# Patient Record
Sex: FEMALE | Race: BLACK OR AFRICAN AMERICAN | NOT HISPANIC OR LATINO | Employment: UNEMPLOYED | URBAN - METROPOLITAN AREA
[De-identification: names, ages, dates, MRNs, and addresses within clinical notes are randomized per-mention and may not be internally consistent; named-entity substitution may affect disease eponyms.]

---

## 2020-01-15 ENCOUNTER — HISTORICAL (OUTPATIENT)
Dept: LAB | Facility: HOSPITAL | Age: 45
End: 2020-01-15

## 2020-01-15 LAB
ABS NEUT (OLG): 11.9 X10(3)/MCL (ref 1.5–6.9)
ALBUMIN SERPL-MCNC: 3.2 GM/DL (ref 3.4–5)
ALBUMIN/GLOB SERPL: 0.5 RATIO
ALP SERPL-CCNC: 78 UNIT/L (ref 30–113)
ALT SERPL-CCNC: 31 UNIT/L (ref 10–45)
ANISOCYTOSIS BLD QL SMEAR: ABNORMAL
APPEARANCE, UA: NORMAL
AST SERPL-CCNC: 18 UNIT/L (ref 15–37)
BASOPHILS # BLD AUTO: 0.1 X10(3)/MCL (ref 0–0.1)
BASOPHILS NFR BLD AUTO: 0 % (ref 0–1)
BILIRUB SERPL-MCNC: 0.2 MG/DL (ref 0.1–0.9)
BILIRUB UR QL STRIP: NEGATIVE
BILIRUBIN DIRECT+TOT PNL SERPL-MCNC: 0.1 MG/DL
BILIRUBIN DIRECT+TOT PNL SERPL-MCNC: 0.1 MG/DL (ref 0–0.3)
BUN SERPL-MCNC: 15 MG/DL (ref 10–20)
CALCIUM SERPL-MCNC: 9.9 MG/DL (ref 8–10.5)
CHLORIDE SERPL-SCNC: 102 MMOL/L (ref 100–108)
CHOLEST SERPL-MCNC: 173 MG/DL (ref 140–200)
CHOLEST/HDLC SERPL: 5 MG/DL (ref 0–8)
CO2 SERPL-SCNC: 30 MMOL/L (ref 21–35)
COLOR UR: NORMAL
CREAT SERPL-MCNC: 0.79 MG/DL (ref 0.7–1.3)
EOSINOPHIL # BLD AUTO: 0 X10(3)/MCL (ref 0–0.6)
EOSINOPHIL NFR BLD AUTO: 0 % (ref 0–5)
ERYTHROCYTE [DISTWIDTH] IN BLOOD BY AUTOMATED COUNT: 16.8 % (ref 11.5–17)
GLOBULIN SER-MCNC: 6.8 GM/DL
GLUCOSE (UA): NEGATIVE
GLUCOSE SERPL-MCNC: 96 MG/DL (ref 75–116)
HCT VFR BLD AUTO: 40.3 % (ref 36–48)
HDLC SERPL-MCNC: 36 MG/DL (ref 35–59)
HGB BLD-MCNC: 12 GM/DL (ref 12–16)
HGB UR QL STRIP: NEGATIVE
HYPOCHROMIA BLD QL SMEAR: ABNORMAL
IMM GRANULOCYTES # BLD AUTO: 0.06 10*3/UL (ref 0–0.02)
IMM GRANULOCYTES NFR BLD AUTO: 0.4 % (ref 0–0.43)
KETONES UR QL STRIP: NEGATIVE
LDLC SERPL CALC-MCNC: 122 MG/DL (ref 100–129)
LEUKOCYTE ESTERASE UR QL STRIP: NEGATIVE
LYMPHOCYTES # BLD AUTO: 3.6 X10(3)/MCL (ref 0.5–4.1)
LYMPHOCYTES NFR BLD AUTO: 22 % (ref 15–40)
MCH RBC QN AUTO: 24 PG (ref 27–34)
MCHC RBC AUTO-ENTMCNC: 30 GM/DL (ref 31–36)
MCV RBC AUTO: 79 FL (ref 80–99)
MONOCYTES # BLD AUTO: 0.3 X10(3)/MCL (ref 0–1.1)
MONOCYTES NFR BLD AUTO: 2 % (ref 4–12)
NEUTROPHILS # BLD AUTO: 11.9 X10(3)/MCL (ref 1.5–6.9)
NEUTROPHILS NFR BLD AUTO: 75 % (ref 43–75)
NITRITE UR QL STRIP: NEGATIVE
PH UR STRIP: 5.5 [PH]
PLATELET # BLD AUTO: 626 X10(3)/MCL (ref 140–400)
PLATELET # BLD EST: ABNORMAL 10*3/UL
PMV BLD AUTO: 9 FL (ref 6.8–10)
POTASSIUM SERPL-SCNC: 4.1 MMOL/L (ref 3.6–5.2)
PROT SERPL-MCNC: 10 GM/DL (ref 6.4–8.2)
PROT UR QL STRIP: NEGATIVE
RBC # BLD AUTO: 5.09 X10(6)/MCL (ref 4.2–5.4)
RBC MORPH BLD: ABNORMAL
SODIUM SERPL-SCNC: 139 MMOL/L (ref 135–145)
SP GR UR STRIP: >=1.03
T4 FREE SERPL-MCNC: 1.1 NG/DL (ref 0.76–1.46)
TRIGL SERPL-MCNC: 100 MG/DL (ref 35–150)
TSH SERPL-ACNC: 1.85 MIU/ML (ref 0.36–3.74)
UROBILINOGEN UR STRIP-ACNC: 0.2 EU/DL
VLDLC SERPL CALC-MCNC: 20 MG/DL
WBC # SPEC AUTO: 15.9 X10(3)/MCL (ref 4.5–11.5)

## 2020-03-03 ENCOUNTER — HISTORICAL (OUTPATIENT)
Dept: ADMINISTRATIVE | Facility: HOSPITAL | Age: 45
End: 2020-03-03

## 2020-03-03 LAB
SERINE PROT 3-ARUP: 5 AU/ML
URATE SERPL-MCNC: 5.3 MG/DL (ref 2.6–6)

## 2020-08-04 ENCOUNTER — HISTORICAL (OUTPATIENT)
Dept: RESPIRATORY THERAPY | Facility: HOSPITAL | Age: 45
End: 2020-08-04

## 2021-02-04 ENCOUNTER — HISTORICAL (OUTPATIENT)
Dept: ADMINISTRATIVE | Facility: HOSPITAL | Age: 46
End: 2021-02-04

## 2021-02-04 LAB
ABS NEUT (OLG): 12.97 X10(3)/MCL (ref 2.1–9.2)
ALBUMIN SERPL-MCNC: 4.3 GM/DL (ref 3.5–5)
ALBUMIN/GLOB SERPL: 0.7 RATIO (ref 1.1–2)
ALP SERPL-CCNC: 111 UNIT/L (ref 40–150)
ALT SERPL-CCNC: 20 UNIT/L (ref 0–55)
APPEARANCE, UA: CLEAR
AST SERPL-CCNC: 22 UNIT/L (ref 5–34)
BACTERIA #/AREA URNS AUTO: ABNORMAL /HPF
BASOPHILS # BLD AUTO: 0.1 X10(3)/MCL (ref 0–0.2)
BASOPHILS NFR BLD AUTO: 0 %
BILIRUB SERPL-MCNC: 0.2 MG/DL
BILIRUB UR QL STRIP: NEGATIVE
BILIRUBIN DIRECT+TOT PNL SERPL-MCNC: 0 MG/DL (ref 0–0.8)
BILIRUBIN DIRECT+TOT PNL SERPL-MCNC: 0.2 MG/DL (ref 0–0.5)
BUN SERPL-MCNC: 16 MG/DL (ref 7–18.7)
C3 SERPL-MCNC: 190 MG/DL (ref 80–173)
C4 SERPL-MCNC: 21 MG/DL (ref 13–46)
CALCIUM SERPL-MCNC: 10.4 MG/DL (ref 8.4–10.2)
CHLORIDE SERPL-SCNC: 97 MMOL/L (ref 98–107)
CO2 SERPL-SCNC: 29 MMOL/L (ref 22–29)
COLOR UR: YELLOW
CREAT SERPL-MCNC: 0.85 MG/DL (ref 0.55–1.02)
CREAT UR-MCNC: 230.3 MG/DL (ref 45–106)
EOSINOPHIL # BLD AUTO: 0.1 X10(3)/MCL (ref 0–0.9)
EOSINOPHIL NFR BLD AUTO: 0 %
ERYTHROCYTE [DISTWIDTH] IN BLOOD BY AUTOMATED COUNT: 16.6 % (ref 11.5–14.5)
ERYTHROCYTE [SEDIMENTATION RATE] IN BLOOD: 59 MM/HR (ref 0–20)
GLOBULIN SER-MCNC: 6 GM/DL (ref 2.4–3.5)
GLUCOSE (UA): NEGATIVE
GLUCOSE SERPL-MCNC: 90 MG/DL (ref 74–100)
HCT VFR BLD AUTO: 45.2 % (ref 35–46)
HGB BLD-MCNC: 13.6 GM/DL (ref 12–16)
HGB UR QL STRIP: NEGATIVE
HYALINE CASTS #/AREA URNS LPF: ABNORMAL /LPF
IMM GRANULOCYTES # BLD AUTO: 0.08 10*3/UL
IMM GRANULOCYTES NFR BLD AUTO: 0 %
KETONES UR QL STRIP: NEGATIVE
LEUKOCYTE ESTERASE UR QL STRIP: NEGATIVE
LYMPHOCYTES # BLD AUTO: 4.8 X10(3)/MCL (ref 0.6–4.6)
LYMPHOCYTES NFR BLD AUTO: 26 %
MCH RBC QN AUTO: 25 PG (ref 26–34)
MCHC RBC AUTO-ENTMCNC: 30.1 GM/DL (ref 31–37)
MCV RBC AUTO: 83.2 FL (ref 80–100)
MONOCYTES # BLD AUTO: 0.6 X10(3)/MCL (ref 0.1–1.3)
MONOCYTES NFR BLD AUTO: 3 %
NEUTROPHILS # BLD AUTO: 12.97 X10(3)/MCL (ref 2.1–9.2)
NEUTROPHILS NFR BLD AUTO: 70 %
NITRITE UR QL STRIP: NEGATIVE
PH UR STRIP: 5.5 [PH] (ref 4.5–8)
PLATELET # BLD AUTO: 486 X10(3)/MCL (ref 130–400)
PMV BLD AUTO: 9.9 FL (ref 7.4–10.4)
POTASSIUM SERPL-SCNC: 3.8 MMOL/L (ref 3.5–5.1)
PROT SERPL-MCNC: 10.3 GM/DL (ref 6.4–8.3)
PROT UR QL STRIP: 30 MG/DL
PROT UR STRIP-MCNC: 36.3 MG/DL
PROT/CREAT UR-RTO: 157.6 MG/GM
RBC # BLD AUTO: 5.43 X10(6)/MCL (ref 4–5.2)
RBC #/AREA URNS AUTO: ABNORMAL /HPF
SODIUM SERPL-SCNC: 139 MMOL/L (ref 136–145)
SP GR UR STRIP: 1.02 (ref 1–1.03)
SQUAMOUS #/AREA URNS LPF: ABNORMAL /LPF
UROBILINOGEN UR STRIP-ACNC: NORMAL
WBC # SPEC AUTO: 18.6 X10(3)/MCL (ref 4.5–11)
WBC #/AREA URNS AUTO: ABNORMAL /HPF

## 2021-02-07 LAB — FINAL CULTURE: NO GROWTH

## 2021-04-29 ENCOUNTER — TELEPHONE (OUTPATIENT)
Dept: TRANSPLANT | Facility: CLINIC | Age: 46
End: 2021-04-29

## 2021-05-05 ENCOUNTER — TELEPHONE (OUTPATIENT)
Dept: TRANSPLANT | Facility: CLINIC | Age: 46
End: 2021-05-05

## 2021-05-10 ENCOUNTER — TELEPHONE (OUTPATIENT)
Dept: TRANSPLANT | Facility: CLINIC | Age: 46
End: 2021-05-10

## 2021-05-27 ENCOUNTER — TELEPHONE (OUTPATIENT)
Dept: TRANSPLANT | Facility: CLINIC | Age: 46
End: 2021-05-27

## 2021-06-21 ENCOUNTER — HISTORICAL (OUTPATIENT)
Dept: RESPIRATORY THERAPY | Facility: HOSPITAL | Age: 46
End: 2021-06-21

## 2021-07-15 ENCOUNTER — HISTORICAL (OUTPATIENT)
Dept: ADMINISTRATIVE | Facility: HOSPITAL | Age: 46
End: 2021-07-15

## 2021-07-15 LAB
ABS NEUT (OLG): 13.17 X10(3)/MCL (ref 2.1–9.2)
ALBUMIN SERPL-MCNC: 4.5 GM/DL (ref 3.5–5)
ALBUMIN/GLOB SERPL: 1 RATIO (ref 1.1–2)
ALP SERPL-CCNC: 92 UNIT/L (ref 40–150)
ALT SERPL-CCNC: 20 UNIT/L (ref 0–55)
AST SERPL-CCNC: 24 UNIT/L (ref 5–34)
BASOPHILS # BLD AUTO: 0.1 X10(3)/MCL (ref 0–0.2)
BASOPHILS NFR BLD AUTO: 0 %
BILIRUB SERPL-MCNC: 0.5 MG/DL
BILIRUBIN DIRECT+TOT PNL SERPL-MCNC: 0.2 MG/DL (ref 0–0.5)
BILIRUBIN DIRECT+TOT PNL SERPL-MCNC: 0.3 MG/DL (ref 0–0.8)
BUN SERPL-MCNC: 10.8 MG/DL (ref 7–18.7)
CALCIUM SERPL-MCNC: 9.8 MG/DL (ref 8.4–10.2)
CHLORIDE SERPL-SCNC: 99 MMOL/L (ref 98–107)
CK SERPL-CCNC: 124 U/L (ref 29–168)
CO2 SERPL-SCNC: 30 MMOL/L (ref 22–29)
CREAT SERPL-MCNC: 0.79 MG/DL (ref 0.55–1.02)
CRP SERPL-MCNC: <0.1 MG/DL
DEPRECATED CALCIDIOL+CALCIFEROL SERPL-MC: 6.3 NG/ML (ref 30–80)
EOSINOPHIL # BLD AUTO: 0.7 X10(3)/MCL (ref 0–0.9)
EOSINOPHIL NFR BLD AUTO: 4 %
ERYTHROCYTE [DISTWIDTH] IN BLOOD BY AUTOMATED COUNT: 19.5 % (ref 11.5–14.5)
ERYTHROCYTE [SEDIMENTATION RATE] IN BLOOD: 8 MM/HR (ref 0–20)
GLOBULIN SER-MCNC: 4.3 GM/DL (ref 2.4–3.5)
GLUCOSE SERPL-MCNC: 77 MG/DL (ref 74–100)
HCT VFR BLD AUTO: 47.2 % (ref 35–46)
HGB BLD-MCNC: 14.3 GM/DL (ref 12–16)
IMM GRANULOCYTES # BLD AUTO: 0.09 10*3/UL
IMM GRANULOCYTES NFR BLD AUTO: 0 %
LYMPHOCYTES # BLD AUTO: 3.1 X10(3)/MCL (ref 0.6–4.6)
LYMPHOCYTES NFR BLD AUTO: 18 %
MCH RBC QN AUTO: 26.8 PG (ref 26–34)
MCHC RBC AUTO-ENTMCNC: 30.3 GM/DL (ref 31–37)
MCV RBC AUTO: 88.6 FL (ref 80–100)
MONOCYTES # BLD AUTO: 0.6 X10(3)/MCL (ref 0.1–1.3)
MONOCYTES NFR BLD AUTO: 3 %
NEG CONT SPOT COUNT: NORMAL
NEUTROPHILS # BLD AUTO: 13.17 X10(3)/MCL (ref 2.1–9.2)
NEUTROPHILS NFR BLD AUTO: 74 %
NRBC BLD AUTO-RTO: 0 % (ref 0–0.2)
PANEL A SPOT COUNT: 0
PANEL B SPOT COUNT: 0
PLATELET # BLD AUTO: 278 X10(3)/MCL (ref 130–400)
PMV BLD AUTO: 10 FL (ref 7.4–10.4)
POS CONT SPOT COUNT: NORMAL
POTASSIUM SERPL-SCNC: 3.4 MMOL/L (ref 3.5–5.1)
PROT SERPL-MCNC: 8.8 GM/DL (ref 6.4–8.3)
RBC # BLD AUTO: 5.33 X10(6)/MCL (ref 4–5.2)
SODIUM SERPL-SCNC: 142 MMOL/L (ref 136–145)
T-SPOT.TB: NORMAL
TSH SERPL-ACNC: 1.59 UIU/ML (ref 0.35–4.94)
WBC # SPEC AUTO: 17.7 X10(3)/MCL (ref 4.5–11)

## 2021-12-16 ENCOUNTER — HISTORICAL (OUTPATIENT)
Dept: RADIOLOGY | Facility: HOSPITAL | Age: 46
End: 2021-12-16

## 2022-04-10 ENCOUNTER — HISTORICAL (OUTPATIENT)
Dept: ADMINISTRATIVE | Facility: HOSPITAL | Age: 47
End: 2022-04-10
Payer: MEDICAID

## 2022-04-28 VITALS
DIASTOLIC BLOOD PRESSURE: 84 MMHG | BODY MASS INDEX: 43.88 KG/M2 | WEIGHT: 189.63 LBS | SYSTOLIC BLOOD PRESSURE: 130 MMHG | OXYGEN SATURATION: 98 % | HEIGHT: 55 IN

## 2022-05-04 NOTE — HISTORICAL [facility] CERNER
This is a historical note converted from Martha. Formatting and pictures may have been removed.  Please reference Martha for original formatting and attached multimedia. Chief Complaint  New patient--pain in R knee  History of Present Illness  ???? Ms. Brady is a 46 y/o AAF who presents to Rheumatology clinic with?+RF and INES (1:640 cytoplasmic) in the setting of non-specific chronic interstitial changes of the lungs. States the work up started after CTs done in [place] w/ pneumonia infection showed more NSIP pattern. Repeat imagining in 8/2020?read bronchiectasis but looking at the imaging there appears to be some fibrosis int he bases. She is on continuous O2 2L per NC. Cardiologist is working?on getting?CPAP for nights. Will request his records and get last ECHO. Gives hx of joint pain in the wrists and knees. Finds warmth helps. Reports 1-2 hr of stiffness in the am. Is currently on Prednisone 10mg/d.  ?  ROS: Denies constitutional s/s, eye inflammations, oral/ nasal ulcers, LAD, sicca, serositis, seizures, blood clots, rash,?sun sensitivity, hair loss, Raynauds, blood dyscrasias, liver or kidney issues.  +cough, shortness of breath, x2 miscarriages at 7 months (lost a 4 month old to SIDS)  ?  PMH: HTN, CHF  Social Hx: Denies ETOH, TBO or drugs.  FH AI Dz: Dad w/ RA  Review of Systems  General: Denies chills and fever  Ophthalmologic: Denies discharge. Denies flashes of light in visual field  ENT: Denies decreased sense of smell. Denies nosebleeds.?  Endocrine: Denies excessive sweating. Denies weight loss  Respiratory: Denies hemoptysis. Denies tuberculosis.  Cardiovascular: Denies cyanosis. Denies rheumatic fever.  Gastrointestinal: Denies difficulty swallowing. Denies hematemesis.  Hematology: Denies bleeding problems. Denies recent transfusion.  Peripheral vascular: Denies absent pulses in feet. Denies ulceration of the feet  Skin: Denies blistering of skin. Denies skin cancer.  Neurologic denies seizures.  Denies stroke.  Psychiatric: Denies eating disorder. Denies loss of appetite.?  Physical Exam  Vitals & Measurements  T:?37? ?C (Oral)? HR:?99(Peripheral)? RR:?22? BP:?117/80? SpO2:?98%?  HT:?140.00?cm? WT:?82.190?kg? BMI:?41.93?  General examination: Alert, pleasant, in no acute distress.  Head: Normocephalic, atraumatic.  Eyes: Conjunctiva clear, upper eyelids normal, lower eyelids normal.  Oral cavity: Mucosa moist, no lesions, fair dentition.  Throat: No exudate, no erythema.  Neck/thyroid: Neck supple, no lymphadenopathy, no thyroid nodules  Skin: No suspicious lesions, warm and dry.  Heart: S1, S2 normal, regular rate and rhythm. No JVD.  Lungs: Course on inspiration upper two thirds. Clear to auscultation, no accessory muscle use otherwise.  Musculoskeletal: Fullness right wrist. Crepitus in b/l knees, no warmth. FROM. No synovitis otherwise. No deformities.  Extremities: No?cyanosis. +clubbing  Neurologic: Alert and oriented, cranial nerves II through XII grossly intact. Muscle strength grossly intact. Sensation grossly intact.  Psych: Cognitive function intact, mood/affect full range.?  ?  (08/04/2020 11:55 CDT CT Thorax High Resolution W/O Contrast)  ?  Reason For Exam  Unspecified asthma, uncomplicated;Other (please specify)  ?  Radiology Report  ?  History.  Unspecified asthma, uncomplicated.  ?  Reference.  No prior chest CT.  ?  Technique.  Helical acquisition through the chest performed without IV contrast  according to high-resolution protocol. Three plane reconstructions  made available for review. DLP 2714 mGycm. Automatic exposure control,  adjustment of mA/kV or iterative reconstruction technique was used to  reduce radiation.  ?  Findings.  Few prominent mediastinal lymph nodes are nonspecific and may be  reactive. Difficult to evaluate the yolande without contrast. There are  small bilateral axillary lymph nodes. Unremarkable thyroid.?  ?  Heart is not significantly enlarged. No pericardial  effusion.?  ?  There is no pleural effusion. There are irregular groundglass and  reticular opacities favoring the mid to lower lungs. There is no  convincing honeycombing. There are some areas of mild bronchiectasis  primarily right middle lobe. No discrete solid pulmonary nodule. No  appreciable air trapping on expiration.  ?  There is no significant abnormality of the imaged upper abdomen. No  acute osseous findings.  ?  Impression.  Nonspecific chronic interstitial changes favoring the mid to lower  lungs. No prior imaging is available for comparison.  Assessment/Plan  1. ILD w/ +RF and +INES. Pt is poor historian but apparently symptoms began with shortness of breath followed by a few episodes of pneumonia w/ subsequent CT chest and autoimmune work up. RF 22, 13. INES 1:640 cytoplasmic. CCP and ANCA negative. She is on continuous O2 at 2L per NC?and states she has been on 10mg prednisone daily for over a year. PFT ordered in August but unable to complete by pt. Will continue work up w/ scleroderma panel, myositis panel, INOCENTE, C3, C4, dsDNA, UPC, UA, ESR, CRP and reorder PFTs. Also, request latest echo from cardiologist, Dr. Nj in [place].  ?  2. Joint Pain w/ positive RF. RF IgG 22, IgM 13. CCP normal.?She does give inflammatory pattern of joint pain w/ 1-2 hr of morning stiffness in the b/l wrists. She is on 10mg prednisone daily, therefore joint exam is somewhat skewed but does have fullness in the right wrist and wears b/l wrist braces.  ?  3. INES. Check INOCENTE, C3, C4, dsDNA, UPC, UA, ESR.  ?  4. Hx of two miscarriages. Both at 7 months gestation. Check APLA.  ?  5. HTN. CCC.  ?  6. Reported hx of CHF. On furosemide 40mg/d. Request cardiology records.  ?  7. Vaccines. Prevnar today. Reports flu is UTD.  ?  8. High Risk Med Use. Check TPMT. Plan for hep serologies and Tspot.  ?  RTC 1 month w/ Dr. Calderon.   Problem List/Past Medical History  Ongoing  INES positive  History of miscarriage  ILD (interstitial lung  disease)  Obesity  Rheumatoid factor positive  Historical  No qualifying data  Procedure/Surgical History  denies   Medications  Albuterol (Eqv-Proventil HFA), INH, q6hr  albuterol 0.083% inhalation solution, 2.5 mg= 3 mL, NEB, q6hr  albuterol-ipratropium 2.5 mg-0.5 mg/3 mL inhalation solution, 3 mL, NEB, q6hr  amLODIPine 10 mg oral tablet, 10 mg= 1 tab(s), Oral, Daily  furosemide 40 mg oral tablet, 40 mg= 1 tab(s), Oral, Daily  gabapentin 100 mg oral capsule, 100 mg= 1 cap(s), Oral, BID  loratadine 10 mg oral tablet, 10 mg= 1 tab(s), Oral, Daily  meloxicam 15 mg oral tablet, 15 mg= 1 tab(s), Oral, Daily  metoprolol tartrate 100 mg oral tab, 100 mg= 1 tab(s), Oral, BID  omeprazole 20 mg oral DR capsule, 20 mg= 1 cap(s), Oral, Daily  prednisONE 10 mg oral tablet, See Instructions, 1 refills  Symbicort 160 mcg-4.5 mcg/inh inhalation aerosol, 2 puff(s), INH, BID  Allergies  No Known Medication Allergies  Social History  Abuse/Neglect  No, 09/16/2019  Alcohol  Never, 09/16/2019  Employment/School  Employed, 09/16/2019  Exercise  Exercise duration: 0., 09/16/2019  Home/Environment  Lives with Spouse., 09/16/2019  Nutrition/Health  Regular, 09/16/2019  Sexual  Gender Identity Identifies as female., 02/04/2021  Sexually active: Yes., 09/16/2019  Spiritual/Cultural  Samaritan, 09/16/2019  Substance Use  Never, 09/16/2019  Tobacco  Never (less than 100 in lifetime), N/A, 02/04/2021  Family History  Diabetes mellitus type 2: Mother and Father.  Heart disease: Mother, Sister and Son.  Hypertension.: Mother and Father.  Kidney disease: Sister.  Immunizations  Vaccine Date Status   pneumococcal 13-valent conjugate vaccine 02/04/2021 Given   Health Maintenance  Health Maintenance  ???Pending?(in the next year)  ??? ??OverDue  ??? ? ? ?Influenza Vaccine due??10/01/20??and every 1??day(s)  ??? ? ? ?Hypertension Management-BMP due??01/14/21??and every 1??year(s)  ??? ??Due?  ??? ? ? ?Alcohol Misuse Screening due??01/02/21??and every  1??year(s)  ??? ? ? ?COPD Maintenance-Spirometry due??02/04/21??Unknown Frequency  ??? ? ? ?Cervical Cancer Screening due??02/04/21??Unknown Frequency  ??? ? ? ?Tetanus Vaccine due??02/04/21??and every 10??year(s)  ??? ??Due In Future?  ??? ? ? ?Obesity Screening not due until??01/01/22??and every 1??year(s)  ???Satisfied?(in the past 1 year)  ??? ??Satisfied?  ??? ? ? ?ADL Screening on??02/04/21.??Satisfied by Milligan RN, Desi R  ??? ? ? ?Blood Pressure Screening on??02/04/21.??Satisfied by Milligan RN, Desi R  ??? ? ? ?Body Mass Index Check on??02/04/21.??Satisfied by Milligan RN, Desi R  ??? ? ? ?Depression Screening on??02/04/21.??Satisfied by Milligan RN, Desi R  ??? ? ? ?Hypertension Management-Blood Pressure on??02/04/21.??Satisfied by Milligan RN, Desi R  ??? ? ? ?Obesity Screening on??02/04/21.??Satisfied by Milligan RN, Desi R  ?     [1]?CT Thorax High Resolution W/O Contrast; Louann BAINS, Ed Russell 08/04/2020 11:55 CDT

## 2022-05-11 ENCOUNTER — TELEPHONE (OUTPATIENT)
Dept: RHEUMATOLOGY | Facility: CLINIC | Age: 47
End: 2022-05-11
Payer: MEDICAID

## 2022-05-11 NOTE — TELEPHONE ENCOUNTER
Received notice Ofev denial was overturned  Called [id number] and spoke to Ayesha  She said Ofev has been dispensed [facility] [place]    Called pt to f/u  Explained med now approved by insurance and she can reorder as she has been   Pt verbalized understanding  Reminded her to call if needs arise    Pt scheduled to see new Rheum Dr. Starr on 5/16/2022

## 2022-06-30 DIAGNOSIS — R74.8 ACID PHOSPHATASE ELEVATED: Primary | ICD-10-CM

## 2022-07-19 ENCOUNTER — HOSPITAL ENCOUNTER (INPATIENT)
Facility: HOSPITAL | Age: 47
LOS: 8 days | Discharge: HOME | DRG: 683 | End: 2022-07-28
Attending: FAMILY MEDICINE | Admitting: STUDENT IN AN ORGANIZED HEALTH CARE EDUCATION/TRAINING PROGRAM
Payer: MEDICARE

## 2022-07-19 DIAGNOSIS — E83.39 HYPOPHOSPHATASIA: ICD-10-CM

## 2022-07-19 DIAGNOSIS — E83.42 HYPOMAGNESEMIA: ICD-10-CM

## 2022-07-19 DIAGNOSIS — E83.39 HYPERPHOSPHATEMIA: ICD-10-CM

## 2022-07-19 DIAGNOSIS — N30.00 ACUTE CYSTITIS WITHOUT HEMATURIA: ICD-10-CM

## 2022-07-19 DIAGNOSIS — N17.9 ACUTE RENAL FAILURE: ICD-10-CM

## 2022-07-19 DIAGNOSIS — N17.9 AKI (ACUTE KIDNEY INJURY): Primary | ICD-10-CM

## 2022-07-19 DIAGNOSIS — Z99.2 ESRD (END STAGE RENAL DISEASE) ON DIALYSIS: ICD-10-CM

## 2022-07-19 DIAGNOSIS — J84.9 ILD (INTERSTITIAL LUNG DISEASE): ICD-10-CM

## 2022-07-19 DIAGNOSIS — E16.2 HYPOGLYCEMIA: ICD-10-CM

## 2022-07-19 DIAGNOSIS — E83.39 HYPOPHOSPHATEMIA: ICD-10-CM

## 2022-07-19 DIAGNOSIS — E83.51 HYPOCALCEMIA: ICD-10-CM

## 2022-07-19 DIAGNOSIS — E87.6 HYPOKALEMIA: ICD-10-CM

## 2022-07-19 DIAGNOSIS — E87.20 METABOLIC ACIDOSIS: ICD-10-CM

## 2022-07-19 DIAGNOSIS — N18.6 ESRD (END STAGE RENAL DISEASE) ON DIALYSIS: ICD-10-CM

## 2022-07-19 DIAGNOSIS — R06.02 SOB (SHORTNESS OF BREATH): ICD-10-CM

## 2022-07-19 DIAGNOSIS — R07.9 CHEST PAIN: ICD-10-CM

## 2022-07-19 DIAGNOSIS — N17.9 ACUTE RENAL FAILURE, UNSPECIFIED ACUTE RENAL FAILURE TYPE: ICD-10-CM

## 2022-07-19 DIAGNOSIS — D89.89 ANTISYNTHETASE SYNDROME: ICD-10-CM

## 2022-07-19 PROCEDURE — 85027 COMPLETE CBC AUTOMATED: CPT | Performed by: STUDENT IN AN ORGANIZED HEALTH CARE EDUCATION/TRAINING PROGRAM

## 2022-07-19 PROCEDURE — 80053 COMPREHEN METABOLIC PANEL: CPT | Performed by: STUDENT IN AN ORGANIZED HEALTH CARE EDUCATION/TRAINING PROGRAM

## 2022-07-19 PROCEDURE — 36415 COLL VENOUS BLD VENIPUNCTURE: CPT | Performed by: STUDENT IN AN ORGANIZED HEALTH CARE EDUCATION/TRAINING PROGRAM

## 2022-07-19 PROCEDURE — 96374 THER/PROPH/DIAG INJ IV PUSH: CPT

## 2022-07-19 PROCEDURE — 85610 PROTHROMBIN TIME: CPT | Performed by: STUDENT IN AN ORGANIZED HEALTH CARE EDUCATION/TRAINING PROGRAM

## 2022-07-19 PROCEDURE — 99285 EMERGENCY DEPT VISIT HI MDM: CPT | Mod: 25

## 2022-07-19 PROCEDURE — 96375 TX/PRO/DX INJ NEW DRUG ADDON: CPT

## 2022-07-20 PROBLEM — E83.39 HYPERPHOSPHATEMIA: Status: ACTIVE | Noted: 2022-07-20

## 2022-07-20 PROBLEM — D89.89 ANTISYNTHETASE SYNDROME: Chronic | Status: ACTIVE | Noted: 2022-07-20

## 2022-07-20 LAB
ABS NEUT CALC (OHS): 11.97 X10(3)/MCL (ref 2.1–9.2)
ALBUMIN SERPL-MCNC: 2.8 GM/DL (ref 3.5–5)
ALBUMIN SERPL-MCNC: 2.9 GM/DL (ref 3.5–5)
ALBUMIN SERPL-MCNC: 3.3 GM/DL (ref 3.5–5)
ALBUMIN/GLOB SERPL: 0.8 RATIO (ref 1.1–2)
ALP SERPL-CCNC: 101 UNIT/L (ref 40–150)
ALP SERPL-CCNC: 107 UNIT/L (ref 40–150)
ALP SERPL-CCNC: 125 UNIT/L (ref 40–150)
ALT SERPL-CCNC: 137 UNIT/L (ref 0–55)
ALT SERPL-CCNC: 146 UNIT/L (ref 0–55)
ALT SERPL-CCNC: 188 UNIT/L (ref 0–55)
APPEARANCE UR: ABNORMAL
AST SERPL-CCNC: 43 UNIT/L (ref 5–34)
AST SERPL-CCNC: 52 UNIT/L (ref 5–34)
AST SERPL-CCNC: 71 UNIT/L (ref 5–34)
AV INDEX (PROSTH): 0.92
AV MEAN GRADIENT: 5 MMHG
AV PEAK GRADIENT: 8 MMHG
AV VALVE AREA: 2.83 CM2
AV VELOCITY RATIO: 0.71
BACTERIA #/AREA URNS AUTO: ABNORMAL /HPF
BASOPHILS # BLD AUTO: 0.05 X10(3)/MCL (ref 0–0.2)
BASOPHILS NFR BLD AUTO: 0.3 %
BASOPHILS NFR BLD MANUAL: 0.19 X10(3)/MCL (ref 0–0.2)
BASOPHILS NFR BLD MANUAL: 1 % (ref 0–2)
BILIRUB UR QL STRIP.AUTO: NEGATIVE MG/DL
BILIRUBIN DIRECT+TOT PNL SERPL-MCNC: 0.4 MG/DL
BILIRUBIN DIRECT+TOT PNL SERPL-MCNC: 0.4 MG/DL
BILIRUBIN DIRECT+TOT PNL SERPL-MCNC: 0.5 MG/DL
BSA FOR ECHO PROCEDURE: 1.68 M2
BUN SERPL-MCNC: 52.2 MG/DL (ref 7–18.7)
BUN SERPL-MCNC: 52.9 MG/DL (ref 7–18.7)
BUN SERPL-MCNC: 55.3 MG/DL (ref 7–18.7)
C3 SERPL-MCNC: 108 MG/DL (ref 80–173)
C4 SERPL-MCNC: 20.8 MG/DL (ref 13–46)
CALCIUM SERPL-MCNC: 7.7 MG/DL (ref 8.4–10.2)
CALCIUM SERPL-MCNC: 7.9 MG/DL (ref 8.4–10.2)
CALCIUM SERPL-MCNC: 8.6 MG/DL (ref 8.4–10.2)
CHLORIDE SERPL-SCNC: 106 MMOL/L (ref 98–107)
CHLORIDE SERPL-SCNC: 106 MMOL/L (ref 98–107)
CHLORIDE SERPL-SCNC: 99 MMOL/L (ref 98–107)
CO2 SERPL-SCNC: 17 MMOL/L (ref 22–29)
CO2 SERPL-SCNC: 18 MMOL/L (ref 22–29)
CO2 SERPL-SCNC: 21 MMOL/L (ref 22–29)
COLOR UR AUTO: ABNORMAL
CREAT SERPL-MCNC: 8.51 MG/DL (ref 0.55–1.02)
CREAT SERPL-MCNC: 8.61 MG/DL (ref 0.55–1.02)
CREAT SERPL-MCNC: 9.34 MG/DL (ref 0.55–1.02)
CREAT UR-MCNC: 110.4 MG/DL (ref 47–110)
CV ECHO LV RWT: 0.5 CM
DOP CALC AO PEAK VEL: 1.43 M/S
DOP CALC AO VTI: 24.1 CM
DOP CALC LVOT AREA: 3.1 CM2
DOP CALC LVOT DIAMETER: 1.98 CM
DOP CALC LVOT PEAK VEL: 1.02 M/S
DOP CALC LVOT STROKE VOLUME: 68.32 CM3
DOP CALC MV VTI: 24.2 CM
DOP CALCLVOT PEAK VEL VTI: 22.2 CM
E WAVE DECELERATION TIME: 175.3 MSEC
E/A RATIO: 1.19
ECHO LV POSTERIOR WALL: 0.89 CM (ref 0.6–1.1)
EJECTION FRACTION: 60 %
EOSINOPHIL # BLD AUTO: 0.25 X10(3)/MCL (ref 0–0.9)
EOSINOPHIL NFR BLD AUTO: 1.3 %
EOSINOPHIL NFR BLD MANUAL: 0.95 X10(3)/MCL (ref 0–0.9)
EOSINOPHIL NFR BLD MANUAL: 5 % (ref 0–8)
ERYTHROCYTE [DISTWIDTH] IN BLOOD BY AUTOMATED COUNT: 15 % (ref 11.5–17)
ERYTHROCYTE [DISTWIDTH] IN BLOOD BY AUTOMATED COUNT: 15 % (ref 11.5–17)
FRACTIONAL SHORTENING: 40 % (ref 28–44)
GLOBULIN SER-MCNC: 3.4 GM/DL (ref 2.4–3.5)
GLOBULIN SER-MCNC: 3.6 GM/DL (ref 2.4–3.5)
GLOBULIN SER-MCNC: 3.9 GM/DL (ref 2.4–3.5)
GLUCOSE SERPL-MCNC: 66 MG/DL (ref 74–100)
GLUCOSE SERPL-MCNC: 74 MG/DL (ref 74–100)
GLUCOSE SERPL-MCNC: 81 MG/DL (ref 74–100)
GLUCOSE UR QL STRIP.AUTO: NORMAL MG/DL
HBV SURFACE AG SERPL QL IA: NONREACTIVE
HCT VFR BLD AUTO: 41.6 % (ref 37–47)
HCT VFR BLD AUTO: 43.9 % (ref 37–47)
HGB BLD-MCNC: 12.7 GM/DL (ref 12–16)
HGB BLD-MCNC: 14.1 GM/DL (ref 12–16)
HR MV ECHO: 94 BPM
HYALINE CASTS #/AREA URNS LPF: ABNORMAL /LPF
IMM GRANULOCYTES # BLD AUTO: 0.1 X10(3)/MCL (ref 0–0.04)
IMM GRANULOCYTES # BLD AUTO: 0.11 X10(3)/MCL (ref 0–0.04)
IMM GRANULOCYTES NFR BLD AUTO: 0.5 %
IMM GRANULOCYTES NFR BLD AUTO: 0.6 %
INTERVENTRICULAR SEPTUM: 0.75 CM (ref 0.6–1.1)
IVC OSTIUM: 3.5 CM
KETONES UR QL STRIP.AUTO: NEGATIVE MG/DL
LACTATE SERPL-SCNC: 1.6 MMOL/L (ref 0.5–2.2)
LEFT ATRIUM SIZE: 2.58 CM
LEFT INTERNAL DIMENSION IN SYSTOLE: 2.15 CM (ref 2.1–4)
LEFT VENTRICLE DIASTOLIC VOLUME INDEX: 33.73 ML/M2
LEFT VENTRICLE DIASTOLIC VOLUME: 53.63 ML
LEFT VENTRICLE MASS INDEX: 51 G/M2
LEFT VENTRICLE SYSTOLIC VOLUME INDEX: 9.7 ML/M2
LEFT VENTRICLE SYSTOLIC VOLUME: 15.36 ML
LEFT VENTRICULAR INTERNAL DIMENSION IN DIASTOLE: 3.58 CM (ref 3.5–6)
LEFT VENTRICULAR MASS: 80.77 G
LEUKOCYTE ESTERASE UR QL STRIP.AUTO: 500 UNIT/L
LV LATERAL E/E' RATIO: 8.9 M/S
LVOT MG: 2.05 MMHG
LVOT MV: 0.66 CM/S
LYMPHOCYTES # BLD AUTO: 6.72 X10(3)/MCL (ref 0.6–4.6)
LYMPHOCYTES NFR BLD AUTO: 34.7 %
LYMPHOCYTES NFR BLD MANUAL: 26 % (ref 13–40)
LYMPHOCYTES NFR BLD MANUAL: 4.94 X10(3)/MCL
MAGNESIUM SERPL-MCNC: 1.9 MG/DL (ref 1.6–2.6)
MCH RBC QN AUTO: 26.6 PG (ref 27–31)
MCH RBC QN AUTO: 27.2 PG (ref 27–31)
MCHC RBC AUTO-ENTMCNC: 30.5 MG/DL (ref 33–36)
MCHC RBC AUTO-ENTMCNC: 32.1 MG/DL (ref 33–36)
MCV RBC AUTO: 84.7 FL (ref 80–94)
MCV RBC AUTO: 87 FL (ref 80–94)
MONOCYTES # BLD AUTO: 1.45 X10(3)/MCL (ref 0.1–1.3)
MONOCYTES NFR BLD AUTO: 7.5 %
MONOCYTES NFR BLD MANUAL: 0.95 X10(3)/MCL (ref 0.1–1.3)
MONOCYTES NFR BLD MANUAL: 5 % (ref 2–11)
MUCOUS THREADS URNS QL MICRO: ABNORMAL /LPF
MV MEAN GRADIENT: 3 MMHG
MV PEAK A VEL: 0.75 M/S
MV PEAK E VEL: 0.89 M/S
MV PEAK GRADIENT: 4 MMHG
MV VALVE AREA BY CONTINUITY EQUATION: 2.82 CM2
NEUTROPHILS # BLD AUTO: 10.8 X10(3)/MCL (ref 2.1–9.2)
NEUTROPHILS NFR BLD AUTO: 55.6 %
NEUTROPHILS NFR BLD MANUAL: 63 % (ref 47–80)
NITRITE UR QL STRIP.AUTO: NEGATIVE
NRBC BLD AUTO-RTO: 0 %
NRBC BLD AUTO-RTO: 0 %
PH UR STRIP.AUTO: 5 [PH]
PHOSPHATE SERPL-MCNC: 5.9 MG/DL (ref 2.3–4.7)
PISA MRMAX VEL: 3.98 M/S
PISA TR MAX VEL: 3.93 M/S
PLATELET # BLD AUTO: 348 X10(3)/MCL (ref 130–400)
PLATELET # BLD AUTO: 391 X10(3)/MCL (ref 130–400)
PLATELET # BLD EST: ADEQUATE 10*3/UL
PMV BLD AUTO: 10.5 FL (ref 7.4–10.4)
PMV BLD AUTO: 9.8 FL (ref 7.4–10.4)
POTASSIUM SERPL-SCNC: 3 MMOL/L (ref 3.5–5.1)
POTASSIUM SERPL-SCNC: 3.6 MMOL/L (ref 3.5–5.1)
POTASSIUM SERPL-SCNC: 3.7 MMOL/L (ref 3.5–5.1)
PROT SERPL-MCNC: 6.2 GM/DL (ref 6.4–8.3)
PROT SERPL-MCNC: 6.5 GM/DL (ref 6.4–8.3)
PROT SERPL-MCNC: 7.2 GM/DL (ref 6.4–8.3)
PROT UR QL STRIP.AUTO: ABNORMAL MG/DL
PROT UR STRIP-MCNC: 33.4 MG/DL
PROT/CREAT UR-RTO: 302.5 MG/GM CR
RA WIDTH: 3.1 CM
RBC # BLD AUTO: 4.78 X10(6)/MCL (ref 4.2–5.4)
RBC # BLD AUTO: 5.18 X10(6)/MCL (ref 4.2–5.4)
RBC #/AREA URNS AUTO: ABNORMAL /HPF
RBC MORPH BLD: NORMAL
RBC UR QL AUTO: NEGATIVE UNIT/L
SODIUM SERPL-SCNC: 138 MMOL/L (ref 136–145)
SODIUM SERPL-SCNC: 139 MMOL/L (ref 136–145)
SODIUM SERPL-SCNC: 139 MMOL/L (ref 136–145)
SODIUM UR-SCNC: 34 MMOL/L
SP GR UR STRIP.AUTO: 1.01
SQUAMOUS #/AREA URNS LPF: ABNORMAL /HPF
TDI LATERAL: 0.1 M/S
TR MAX PG: 62 MMHG
TRICUSPID ANNULAR PLANE SYSTOLIC EXCURSION: 1.3 CM
UROBILINOGEN UR STRIP-ACNC: NORMAL MG/DL
WBC # SPEC AUTO: 19 X10(3)/MCL (ref 4.5–11.5)
WBC # SPEC AUTO: 19.3 X10(3)/MCL (ref 4.5–11.5)
WBC #/AREA URNS AUTO: ABNORMAL /HPF

## 2022-07-20 PROCEDURE — 86225 DNA ANTIBODY NATIVE: CPT | Performed by: INTERNAL MEDICINE

## 2022-07-20 PROCEDURE — 87040 BLOOD CULTURE FOR BACTERIA: CPT | Performed by: STUDENT IN AN ORGANIZED HEALTH CARE EDUCATION/TRAINING PROGRAM

## 2022-07-20 PROCEDURE — 82570 ASSAY OF URINE CREATININE: CPT | Performed by: STUDENT IN AN ORGANIZED HEALTH CARE EDUCATION/TRAINING PROGRAM

## 2022-07-20 PROCEDURE — 86160 COMPLEMENT ANTIGEN: CPT | Performed by: STUDENT IN AN ORGANIZED HEALTH CARE EDUCATION/TRAINING PROGRAM

## 2022-07-20 PROCEDURE — 27000221 HC OXYGEN, UP TO 24 HOURS

## 2022-07-20 PROCEDURE — S0030 INJECTION, METRONIDAZOLE: HCPCS | Performed by: STUDENT IN AN ORGANIZED HEALTH CARE EDUCATION/TRAINING PROGRAM

## 2022-07-20 PROCEDURE — 83735 ASSAY OF MAGNESIUM: CPT | Performed by: STUDENT IN AN ORGANIZED HEALTH CARE EDUCATION/TRAINING PROGRAM

## 2022-07-20 PROCEDURE — 84165 PROTEIN E-PHORESIS SERUM: CPT | Performed by: INTERNAL MEDICINE

## 2022-07-20 PROCEDURE — S5010 5% DEXTROSE AND 0.45% SALINE: HCPCS | Performed by: INTERNAL MEDICINE

## 2022-07-20 PROCEDURE — 25000003 PHARM REV CODE 250: Performed by: STUDENT IN AN ORGANIZED HEALTH CARE EDUCATION/TRAINING PROGRAM

## 2022-07-20 PROCEDURE — 97165 OT EVAL LOW COMPLEX 30 MIN: CPT

## 2022-07-20 PROCEDURE — 25000003 PHARM REV CODE 250: Performed by: INTERNAL MEDICINE

## 2022-07-20 PROCEDURE — 80053 COMPREHEN METABOLIC PANEL: CPT | Performed by: INTERNAL MEDICINE

## 2022-07-20 PROCEDURE — 11000001 HC ACUTE MED/SURG PRIVATE ROOM

## 2022-07-20 PROCEDURE — 63600175 PHARM REV CODE 636 W HCPCS: Performed by: FAMILY MEDICINE

## 2022-07-20 PROCEDURE — 30000890 HC MISC. SEND OUT TEST

## 2022-07-20 PROCEDURE — 84100 ASSAY OF PHOSPHORUS: CPT | Performed by: STUDENT IN AN ORGANIZED HEALTH CARE EDUCATION/TRAINING PROGRAM

## 2022-07-20 PROCEDURE — 85025 COMPLETE CBC W/AUTO DIFF WBC: CPT | Performed by: STUDENT IN AN ORGANIZED HEALTH CARE EDUCATION/TRAINING PROGRAM

## 2022-07-20 PROCEDURE — 83520 IMMUNOASSAY QUANT NOS NONAB: CPT | Performed by: INTERNAL MEDICINE

## 2022-07-20 PROCEDURE — 97162 PT EVAL MOD COMPLEX 30 MIN: CPT

## 2022-07-20 PROCEDURE — 84300 ASSAY OF URINE SODIUM: CPT | Performed by: STUDENT IN AN ORGANIZED HEALTH CARE EDUCATION/TRAINING PROGRAM

## 2022-07-20 PROCEDURE — 93005 ELECTROCARDIOGRAM TRACING: CPT

## 2022-07-20 PROCEDURE — 94761 N-INVAS EAR/PLS OXIMETRY MLT: CPT

## 2022-07-20 PROCEDURE — 83522 HC MISC. SEND OUT TEST: CPT

## 2022-07-20 PROCEDURE — 80100014 HC HEMODIALYSIS 1:1

## 2022-07-20 PROCEDURE — 83605 ASSAY OF LACTIC ACID: CPT | Performed by: STUDENT IN AN ORGANIZED HEALTH CARE EDUCATION/TRAINING PROGRAM

## 2022-07-20 PROCEDURE — 30000890 MAYO GENERIC ORDERABLE: Performed by: INTERNAL MEDICINE

## 2022-07-20 PROCEDURE — 25000003 PHARM REV CODE 250: Performed by: FAMILY MEDICINE

## 2022-07-20 PROCEDURE — 63600175 PHARM REV CODE 636 W HCPCS: Performed by: STUDENT IN AN ORGANIZED HEALTH CARE EDUCATION/TRAINING PROGRAM

## 2022-07-20 PROCEDURE — 36415 COLL VENOUS BLD VENIPUNCTURE: CPT | Performed by: STUDENT IN AN ORGANIZED HEALTH CARE EDUCATION/TRAINING PROGRAM

## 2022-07-20 PROCEDURE — 87340 HEPATITIS B SURFACE AG IA: CPT | Performed by: NURSE PRACTITIONER

## 2022-07-20 PROCEDURE — 87088 URINE BACTERIA CULTURE: CPT | Performed by: STUDENT IN AN ORGANIZED HEALTH CARE EDUCATION/TRAINING PROGRAM

## 2022-07-20 PROCEDURE — 80053 COMPREHEN METABOLIC PANEL: CPT | Performed by: STUDENT IN AN ORGANIZED HEALTH CARE EDUCATION/TRAINING PROGRAM

## 2022-07-20 PROCEDURE — 81001 URINALYSIS AUTO W/SCOPE: CPT | Performed by: STUDENT IN AN ORGANIZED HEALTH CARE EDUCATION/TRAINING PROGRAM

## 2022-07-20 RX ORDER — MELOXICAM 15 MG/1
15 TABLET ORAL DAILY
COMMUNITY
End: 2022-09-14 | Stop reason: SDUPTHER

## 2022-07-20 RX ORDER — EZETIMIBE 10 MG/1
10 TABLET ORAL NIGHTLY
COMMUNITY
Start: 2022-05-30

## 2022-07-20 RX ORDER — ONDANSETRON 2 MG/ML
4 INJECTION INTRAMUSCULAR; INTRAVENOUS
Status: COMPLETED | OUTPATIENT
Start: 2022-07-20 | End: 2022-07-20

## 2022-07-20 RX ORDER — ONDANSETRON 4 MG/1
4 TABLET, ORALLY DISINTEGRATING ORAL EVERY 6 HOURS PRN
COMMUNITY
Start: 2022-07-13

## 2022-07-20 RX ORDER — PREDNISONE 10 MG/1
10 TABLET ORAL DAILY
COMMUNITY
End: 2022-09-14 | Stop reason: ALTCHOICE

## 2022-07-20 RX ORDER — FUROSEMIDE 40 MG/1
40 TABLET ORAL DAILY
Status: ON HOLD | COMMUNITY
End: 2022-07-28 | Stop reason: HOSPADM

## 2022-07-20 RX ORDER — DICLOFENAC SODIUM 10 MG/G
1 GEL TOPICAL EVERY 6 HOURS PRN
COMMUNITY
Start: 2022-06-23

## 2022-07-20 RX ORDER — ERGOCALCIFEROL 1.25 MG/1
1 CAPSULE ORAL
COMMUNITY

## 2022-07-20 RX ORDER — LORATADINE 10 MG/1
10 TABLET ORAL DAILY
COMMUNITY

## 2022-07-20 RX ORDER — MORPHINE SULFATE 2 MG/ML
4 INJECTION, SOLUTION INTRAMUSCULAR; INTRAVENOUS
Status: COMPLETED | OUTPATIENT
Start: 2022-07-20 | End: 2022-07-20

## 2022-07-20 RX ORDER — SODIUM CHLORIDE 9 MG/ML
INJECTION, SOLUTION INTRAVENOUS CONTINUOUS
Status: DISCONTINUED | OUTPATIENT
Start: 2022-07-20 | End: 2022-07-20

## 2022-07-20 RX ORDER — METRONIDAZOLE 500 MG/100ML
500 INJECTION, SOLUTION INTRAVENOUS
Status: DISCONTINUED | OUTPATIENT
Start: 2022-07-20 | End: 2022-07-22

## 2022-07-20 RX ORDER — IBUPROFEN 200 MG
16 TABLET ORAL
Status: DISCONTINUED | OUTPATIENT
Start: 2022-07-20 | End: 2022-07-28 | Stop reason: HOSPADM

## 2022-07-20 RX ORDER — IBUPROFEN 200 MG
24 TABLET ORAL
Status: DISCONTINUED | OUTPATIENT
Start: 2022-07-20 | End: 2022-07-28 | Stop reason: HOSPADM

## 2022-07-20 RX ORDER — HEPARIN SODIUM 5000 [USP'U]/ML
5000 INJECTION, SOLUTION INTRAVENOUS; SUBCUTANEOUS EVERY 12 HOURS
Status: DISCONTINUED | OUTPATIENT
Start: 2022-07-20 | End: 2022-07-24

## 2022-07-20 RX ORDER — GLUCAGON 1 MG
1 KIT INJECTION
Status: DISCONTINUED | OUTPATIENT
Start: 2022-07-20 | End: 2022-07-28 | Stop reason: HOSPADM

## 2022-07-20 RX ORDER — OMEPRAZOLE 20 MG/1
20 CAPSULE, DELAYED RELEASE ORAL DAILY
COMMUNITY

## 2022-07-20 RX ORDER — MUPIROCIN 20 MG/G
OINTMENT TOPICAL 2 TIMES DAILY
Status: DISPENSED | OUTPATIENT
Start: 2022-07-20 | End: 2022-07-25

## 2022-07-20 RX ORDER — BUDESONIDE AND FORMOTEROL FUMARATE DIHYDRATE 160; 4.5 UG/1; UG/1
1 AEROSOL RESPIRATORY (INHALATION) DAILY PRN
COMMUNITY

## 2022-07-20 RX ORDER — TOCILIZUMAB 180 MG/ML
INJECTION, SOLUTION SUBCUTANEOUS
COMMUNITY
End: 2022-07-20 | Stop reason: CLARIF

## 2022-07-20 RX ORDER — METOPROLOL TARTRATE 100 MG/1
100 TABLET ORAL 2 TIMES DAILY
COMMUNITY

## 2022-07-20 RX ORDER — ACETAMINOPHEN 500 MG
1000 TABLET ORAL EVERY 6 HOURS PRN
Status: DISCONTINUED | OUTPATIENT
Start: 2022-07-20 | End: 2022-07-28 | Stop reason: HOSPADM

## 2022-07-20 RX ORDER — NINTEDANIB 150 MG/1
CAPSULE ORAL
COMMUNITY
Start: 2021-12-02 | End: 2022-08-12 | Stop reason: SDUPTHER

## 2022-07-20 RX ORDER — ALBUTEROL SULFATE 90 UG/1
2 AEROSOL, METERED RESPIRATORY (INHALATION) EVERY 6 HOURS
COMMUNITY
Start: 2022-05-24

## 2022-07-20 RX ORDER — ALBUTEROL SULFATE 0.83 MG/ML
3 SOLUTION RESPIRATORY (INHALATION) EVERY 8 HOURS PRN
COMMUNITY
Start: 2022-06-23

## 2022-07-20 RX ORDER — HYDROXYZINE PAMOATE 25 MG/1
25 CAPSULE ORAL NIGHTLY PRN
Status: DISCONTINUED | OUTPATIENT
Start: 2022-07-20 | End: 2022-07-28 | Stop reason: HOSPADM

## 2022-07-20 RX ORDER — AMLODIPINE BESYLATE 10 MG/1
10 TABLET ORAL DAILY
COMMUNITY

## 2022-07-20 RX ORDER — GABAPENTIN 300 MG/1
300 CAPSULE ORAL 2 TIMES DAILY
COMMUNITY
Start: 2021-12-02 | End: 2022-09-14 | Stop reason: SDUPTHER

## 2022-07-20 RX ORDER — IPRATROPIUM BROMIDE AND ALBUTEROL SULFATE 2.5; .5 MG/3ML; MG/3ML
3 SOLUTION RESPIRATORY (INHALATION) EVERY 6 HOURS PRN
Status: DISCONTINUED | OUTPATIENT
Start: 2022-07-20 | End: 2022-07-28 | Stop reason: HOSPADM

## 2022-07-20 RX ORDER — NALOXONE HCL 0.4 MG/ML
0.02 VIAL (ML) INJECTION
Status: DISCONTINUED | OUTPATIENT
Start: 2022-07-20 | End: 2022-07-28 | Stop reason: HOSPADM

## 2022-07-20 RX ORDER — ONDANSETRON 2 MG/ML
4 INJECTION INTRAMUSCULAR; INTRAVENOUS EVERY 8 HOURS PRN
Status: DISCONTINUED | OUTPATIENT
Start: 2022-07-20 | End: 2022-07-22

## 2022-07-20 RX ORDER — SODIUM CHLORIDE 0.9 % (FLUSH) 0.9 %
10 SYRINGE (ML) INJECTION EVERY 12 HOURS PRN
Status: DISCONTINUED | OUTPATIENT
Start: 2022-07-20 | End: 2022-07-28 | Stop reason: HOSPADM

## 2022-07-20 RX ORDER — TALC
6 POWDER (GRAM) TOPICAL NIGHTLY PRN
Status: DISCONTINUED | OUTPATIENT
Start: 2022-07-20 | End: 2022-07-28 | Stop reason: HOSPADM

## 2022-07-20 RX ADMIN — SODIUM BICARBONATE: 84 INJECTION, SOLUTION INTRAVENOUS at 09:07

## 2022-07-20 RX ADMIN — POTASSIUM BICARBONATE 40 MEQ: 391 TABLET, EFFERVESCENT ORAL at 03:07

## 2022-07-20 RX ADMIN — MUPIROCIN: 20 OINTMENT TOPICAL at 09:07

## 2022-07-20 RX ADMIN — ONDANSETRON 4 MG: 2 INJECTION INTRAMUSCULAR; INTRAVENOUS at 12:07

## 2022-07-20 RX ADMIN — METRONIDAZOLE 500 MG: 500 INJECTION, SOLUTION INTRAVENOUS at 03:07

## 2022-07-20 RX ADMIN — MELATONIN TAB 3 MG 6 MG: 3 TAB at 08:07

## 2022-07-20 RX ADMIN — SODIUM CHLORIDE 1000 ML: 9 INJECTION, SOLUTION INTRAVENOUS at 02:07

## 2022-07-20 RX ADMIN — MORPHINE SULFATE 4 MG: 2 INJECTION, SOLUTION INTRAMUSCULAR; INTRAVENOUS at 12:07

## 2022-07-20 RX ADMIN — CEFEPIME 1 G: 1 INJECTION, POWDER, FOR SOLUTION INTRAMUSCULAR; INTRAVENOUS at 05:07

## 2022-07-20 RX ADMIN — SODIUM CHLORIDE: 9 INJECTION, SOLUTION INTRAVENOUS at 02:07

## 2022-07-20 RX ADMIN — METRONIDAZOLE 500 MG: 500 INJECTION, SOLUTION INTRAVENOUS at 10:07

## 2022-07-20 RX ADMIN — HEPARIN SODIUM 5000 UNITS: 5000 INJECTION, SOLUTION INTRAVENOUS; SUBCUTANEOUS at 09:07

## 2022-07-20 RX ADMIN — HEPARIN SODIUM 5000 UNITS: 5000 INJECTION, SOLUTION INTRAVENOUS; SUBCUTANEOUS at 08:07

## 2022-07-20 RX ADMIN — METRONIDAZOLE 500 MG: 500 INJECTION, SOLUTION INTRAVENOUS at 06:07

## 2022-07-20 RX ADMIN — HYDROXYZINE PAMOATE 25 MG: 25 CAPSULE ORAL at 08:07

## 2022-07-20 NOTE — PT/OT/SLP EVAL
Physical Therapy Evaluation    Patient Name:  Ofelia Brady   MRN:  [id number]    Recommendations:     Discharge Recommendations:  home with home health   Discharge Equipment Recommendations: none   Barriers to discharge: None    Assessment:     Ofelia Brady is a 47 y.o. female admitted with a medical diagnosis of <principal problem not specified>.  She presents with the following impairments/functional limitations:  weakness, impaired endurance, impaired functional mobility, impaired balance .    Rehab Prognosis: Good; patient would benefit from acute skilled PT services to address these deficits and reach maximum level of function.    Recent Surgery: * No surgery found *      Plan:     During this hospitalization, patient to be seen  (3-5x per week) to address the identified rehab impairments via gait training, therapeutic activities, therapeutic exercises and progress toward the following goals:    · Plan of Care Expires:  08/19/22    Subjective     Chief Complaint: Pt complains of being fatigued.  Patient/Family Comments/goals: Return home  Pain/Comfort:  · Pain Rating 1: 0/10  · Pain Rating Post-Intervention 1: 0/10    Patients cultural, spiritual, Hindu conflicts given the current situation: no    Living Environment:  Pt lives with her family in a single level home with 3 steps to the front entrance with a railing on the left.  Prior to admission, patients level of function required assistance from her family for ADL's.  Equipment used at home: shower chair, rollator, cane, straight.  DME owned (not currently used): Rollator, SC, Shower Chair.  Upon discharge, patient will have assistance from her family.    Objective:     Communicated with nurse Gallardo prior to session.  Patient found supine with peripheral IV, SCD  upon PT entry to room.    General Precautions: Standard,     Orthopedic Precautions:N/A   Braces: N/A  Respiratory Status: Nasal cannula, flow 2 L/min    Exams:  · Cognitive  Exam:  Patient is oriented to Person, Place, Time and Situation  · Sensation: -       Intact  · RUE ROM: WNL  · RUE Strength: WNL  · LUE ROM: WNL  · LUE Strength: WNL  · RLE ROM: WNL  · RLE Strength: WNL  · LLE ROM: WNL  · LLE Strength: WNL    Functional Mobility:  · Patient refused to participate in any functional activities today.        Patient left supine with all lines intact, call button in reach and nurse notified.    GOALS:   Multidisciplinary Problems     Physical Therapy Goals        Problem: Physical Therapy    Goal Priority Disciplines Outcome Goal Variances Interventions   Physical Therapy Goal     PT, PT/OT      Description: Goals to be met by: D/C     Patient will increase functional independence with mobility by performin. Supine to sit with MInimal Assistance  2. Sit to stand transfer with Minimal Assistance  3. Gait  x 130 feet with Minimal Assistance using Rolling Walker.                      History:     History reviewed. No pertinent past medical history.    History reviewed. No pertinent surgical history.    Time Tracking:     PT Received On: 22  PT Start Time: 1003     PT Stop Time: 1018  PT Total Time (min): 15 min     Billable Minutes: Evaluation 15 mins      2022

## 2022-07-20 NOTE — H&P
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 Calderon. Presents to ED as a transfer from [facility] for [facility]. She reports she began experiencing SOB around 5AM. Denies any f/c, cough, orthopnea, leg swelling. She has some SOB at baseline, uses 3L home O2 but reports this was worse than usual. She has a hx of recurrent PNA requiring hospitalization. She also reports 2-3 week histroy of generalized fatigue, n/v, and diarrhea. Having around 2 episodes of emesis per day, associated w/ meals, difficulty tolerating PO intake, also having multiple episodes of loose watery stools daily. Denies any blood in the vomit/stool. Denies chest pain, abdominal pain, dysuria. Reports she is urinating less often and smaller volume. She has not seen Dr. Calderon since last December however she reports compliance w/ her Actemra and Ofev. No longer taking prednisone daily since that time. She denies any recent sick contacts. Last hospitalized 1 month ago w/ PNA.     At outside facility, work up showed elevated WBC 15, BUN/Crt 54/9, no electrolyte abnormalities. Troponin, BNP WNL. COVID negative. UA showed WBCs and bacteria. She was given 1L NS and Rocephin x1, trasnfered to [facility] for ARF. In ED, /75, afebrile, O2 100% on 3L. On my interview, she is alert and oriented x3, in no distress, somewhat lethargic. Answers all questions appropriately. Reports her SOB has since resolved and has no acute complaints. Repeat labs here show increase in WBC to 19, BUN/Crt 55/9, AST 71, . CXR shows increased  interstitial markings but no obvious opacities. Renal US unremarkable. Medicine consulted for ARF.     PMH: As above   SH: Nonsmoker, no ETOH, drugs   NKDA        History reviewed. No pertinent past medical history.    History reviewed. No pertinent surgical history.    Review of patient's allergies indicates:  No Known Allergies    No current facility-administered medications on file prior to encounter.     No current outpatient medications on file prior to encounter.     Family History    None       Tobacco Use    Smoking status: Not on file    Smokeless tobacco: Not on file   Substance and Sexual Activity    Alcohol use: Not on file    Drug use: Not on file    Sexual activity: Not on file     Review of Systems   Constitutional: Positive for appetite change and fatigue. Negative for fever.   Respiratory: Positive for shortness of breath. Negative for cough, chest tightness and wheezing.    Cardiovascular: Negative for chest pain, palpitations and leg swelling.   Gastrointestinal: Positive for diarrhea, nausea and vomiting. Negative for abdominal pain, blood in stool and constipation.   Genitourinary: Negative for dysuria and hematuria.     Objective:     Vital Signs (Most Recent):  Temp: 98.1 °F (36.7 °C) (07/19/22 2316)  Pulse: 91 (07/20/22 0116)  Resp: 17 (07/20/22 0019)  BP: 92/75 (07/20/22 0116)  SpO2: 100 % (07/20/22 0116) Vital Signs (24h Range):  Temp:  [98.1 °F (36.7 °C)] 98.1 °F (36.7 °C)  Pulse:  [85-91] 91  Resp:  [17-20] 17  SpO2:  [95 %-100 %] 100 %  BP: ()/(66-75) 92/75     Weight: 84.8 kg (187 lb)  Body mass index is 43.46 kg/m².    Physical Exam  Constitutional:       General: She is not in acute distress.     Appearance: She is obese. She is not toxic-appearing.   HENT:      Head: Normocephalic and atraumatic.      Nose: Nose normal. No congestion.      Mouth/Throat:      Mouth: Mucous membranes are dry.      Pharynx: Oropharynx is clear. No oropharyngeal exudate.   Eyes:       Extraocular Movements: Extraocular movements intact.      Conjunctiva/sclera: Conjunctivae normal.      Pupils: Pupils are equal, round, and reactive to light.   Neck:      Vascular: No carotid bruit.   Cardiovascular:      Rate and Rhythm: Normal rate and regular rhythm.      Pulses: Normal pulses.      Heart sounds: No murmur heard.    No gallop.   Pulmonary:      Effort: Pulmonary effort is normal. No respiratory distress.      Comments: Mild crackles throughout both lung fields   Abdominal:      General: Abdomen is flat. Bowel sounds are normal. There is no distension.      Palpations: Abdomen is soft.      Tenderness: There is no abdominal tenderness. There is no right CVA tenderness, left CVA tenderness or guarding.   Musculoskeletal:         General: No deformity.      Right lower leg: No edema.      Left lower leg: No edema.   Skin:     General: Skin is cool.      Capillary Refill: Capillary refill takes more than 3 seconds.      Coloration: Skin is not jaundiced.      Findings: No erythema or rash.   Neurological:      General: No focal deficit present.      Mental Status: She is alert and oriented to person, place, and time.      Sensory: No sensory deficit.      Motor: No weakness.       Assessment/Plan:   ARF   - May be secondary to volume depletion given hx of n/v and diarrhea, however BUN/Crt ratio <20   - Does appear hypovolemic on exam   - Differential includes AIN vs glomerulonephritis 2/2 to rheumatologic disease   - Will attempt to resuscitate w/ IVF and monitor renal indices for improvement. Giving another bolus NS and maintenance rate    - No indications for emergent HD   - Consult renal in AM   - Will order urine Na, prt/crt, ANCA, C3/C4, SPEP   - May ultimately need high dose steroids, renal bx     Leukocytosis   UTI   Hx of recurrent PNAs   - Given Rocephin at outside facility   - She does have some soft BP in triage   - WBC increased to 19, reports she has not been on PO steroids for months  now   - On immuno modulators   - Will check LA, draw blood cultures. Repeat UA/urine  cx   - Start broad spectrum abx for now awaiting results, primary team to de-escalate     DVT: Heparin   Lines: PIV   Abx: Cefepime, flagyl       Savage Kimble MD  Department of Hospital Medicine   [facility] -

## 2022-07-20 NOTE — PT/OT/SLP EVAL
Occupational Therapy   Evaluation    Name: Ofelia Brady  MRN: [id number]  Admitting Diagnosis:   Patient Active Problem List   Diagnosis    DUYEN (acute kidney injury)    Metabolic acidosis    Hypokalemia    Hypoglycemia       Recent Surgery: * No surgery found *      Recommendations:     Discharge Recommendations: home with home health  Discharge Equipment Recommendations:     Barriers to discharge:       Assessment:     Ofelia Brady is a 47 y.o. female with a medical diagnosis as noted above.  She presents with general debility and functional decline. Performance deficits affecting function: weakness, impaired endurance, impaired functional mobility, impaired self care skills, impaired balance, pain.      Rehab Prognosis: Good; patient would benefit from acute skilled OT services to address these deficits and reach maximum level of function.       Plan:     Patient to be seen 3 x/week, 2 x/week to address the above listed problems via self-care/home management, therapeutic activities, therapeutic exercises  · Plan of Care Expires: 08/03/22  · Plan of Care Reviewed with: patient    Subjective     Chief Complaint: weakness, thirst (pt is NPO at this time)  Patient/Family Comments/goals: to return home at [facility] with her  and sons assisting as needed    Occupational Profile:  Living Environment: [facility] with her  and two sons  Previous level of function: pt's family assists with bathing and LB dressing; pt is mod I with toileting, UB dressing, grooming, and eating, using rollator for mobility in her home  Roles and Routines: pt stated her  and sons perform cooking, cleaning, and driving her to appointments  Equipment Used at Home:  rollator, shower chair  Assistance upon Discharge: as needed from family, recommend home health therapy services    Pain/Comfort:  · Pain Rating 1: 4/10  · Location - Side 1: Right  · Location 1: knee  · Pain Addressed 1: Reposition  · Pain Rating 2:  4/10  · Location - Side 2: Left  · Location 2: elbow  · Pain Addressed 2: Reposition    Patients cultural, spiritual, Church conflicts given the current situation: no    Objective:     Communicated with: nurse prior to session.  Patient found HOB elevated with peripheral IV, SCD upon OT entry to room.    General Precautions: Standard,     Orthopedic Precautions:    Braces:    Respiratory Status: Room air    Occupational Performance:    Bed Mobility:    · Patient completed Rolling/Turning to Left with  modified independence and stand by assistance  · Patient completed Supine to Sit with stand by assistance  · Patient completed Sit to Supine with stand by assistance    Functional Mobility/Transfers:  · Patient completed Sit <> Stand Transfer with contact guard assistance  with  hand-held assist   · Patient completed Toilet Transfer Step Transfer technique with minimum assistance with  hand-held assist  · Functional Mobility: min A ambulating handheld in room    Activities of Daily Living:  · Feeding:  modified independence .  · Grooming: minimum assistance standing at sink  · Bathing: maximal assistance EOB  · Upper Body Dressing: contact guard assistance seated EOB  · Lower Body Dressing: moderate assistance .  · Toileting: moderate assistance for assistance with hygiene following bowel movement, standing balance during clothing managment    Cognitive/Visual Perceptual:  Cognitive/Psychosocial Skills:     -       Oriented to: Person, Place and Situation   -       Follows Commands/attention:Follows two-step commands  -       Safety awareness/insight to disability: intact     Physical Exam:  Upper Extremity Range of Motion:     -       Right Upper Extremity: WFL  -       Left Upper Extremity: WFL  Upper Extremity Strength:    -       Right Upper Extremity: Deficits: diffuse weakness 3-/5, c/o chronic soreness/tenderness at wrists bilaterally  -       Left Upper Extremity: Deficits: diffuse weakness 3-/5, c/o  chronic soreness/tenderness at wrists bilaterally, and c/o chronic arthritic pain at L elbow 4/10    AMPAC 6 Click ADL:  [facility] Total Score:      Treatment & Education:  Pt educated on safety, use of call bell for assist with all OOB tfs, and goals and POC for OT 2-3x/wk during this hospital stay with goal to return to baseline and DC home with family.  Education:    Patient left HOB elevated with all lines intact and call button in reach    GOALS:   Multidisciplinary Problems     Occupational Therapy Goals        Problem: Occupational Therapy    Goal Priority Disciplines Outcome Interventions   Occupational Therapy Goal     OT, PT/OT Ongoing, Progressing    Description: Patient will perform grooming with standby assist while standing at sink.    Patient will perform toileting with mod I using toilet or BSC.    Patient will perform toilet transfer with SBA with use of grab bars or RW.    Patient will perform upper body dressing with setup assist while seated EOB.     Patient will perform lower body dressing with min assist to don socks/shoes while seated EOB.                      History:     History reviewed. No pertinent past medical history.    History reviewed. No pertinent surgical history.    Time Tracking:     OT Date of Treatment: 07/20/22  OT Start Time: 0905  OT Stop Time: 0923  OT Total Time (min): 18 min    Billable Minutes:Evaluation 18    7/20/2022

## 2022-07-20 NOTE — ED PROVIDER NOTES
Encounter Date: 7/19/2022       History     Chief Complaint   Patient presents with    Abdominal Pain     Transfer from [place] for DUYEN     46 y/o female transferred from [place], [place] for renal services due to DUYEN.  History of Lupus.  Patient presented to the ER with complaints of dyspnea and fatigue with associated nausea and vomiting.  On laboratory evaluation, noted to have elevated BUN/Cre 54/9 as well as a UTI.  Patient received Rocephin 1gm IV and transferred for renal services.    The history is provided by the patient.     Review of patient's allergies indicates:  No Known Allergies  History reviewed. No pertinent past medical history.  History reviewed. No pertinent surgical history.  History reviewed. No pertinent family history.     Review of Systems   Constitutional: Positive for fatigue. Negative for chills and fever.   HENT: Negative for ear pain, rhinorrhea and sore throat.    Eyes: Negative for photophobia and pain.   Respiratory: Positive for shortness of breath. Negative for cough and wheezing.    Cardiovascular: Negative for chest pain.   Gastrointestinal: Positive for abdominal pain, nausea and vomiting. Negative for diarrhea.   Genitourinary: Negative for dysuria.   Musculoskeletal: Negative for back pain.   Skin: Negative for rash.   Neurological: Negative for dizziness, weakness and headaches.   Hematological: Does not bruise/bleed easily.   All other systems reviewed and are negative.      Physical Exam     Initial Vitals [07/19/22 2316]   BP Pulse Resp Temp SpO2   105/75 85 20 98.1 °F (36.7 °C) 95 %      MAP       --         Physical Exam    Nursing note and vitals reviewed.  Constitutional: She appears well-developed and well-nourished. No distress.   HENT:   Head: Normocephalic and atraumatic.   Eyes: Conjunctivae and EOM are normal. Pupils are equal, round, and reactive to light.   Neck: Neck supple.   Normal range of motion.  Cardiovascular: Normal rate, regular rhythm and normal heart  sounds.   Pulmonary/Chest: Breath sounds normal. No respiratory distress. She has no wheezes. She has no rhonchi. She has no rales.   Abdominal: Abdomen is soft. Bowel sounds are normal. She exhibits no distension. There is abdominal tenderness.   Mild generalized abdominal tenderness There is no rebound and no guarding.   Musculoskeletal:         General: Normal range of motion.      Cervical back: Normal range of motion and neck supple.     Neurological: She is alert and oriented to person, place, and time.   Skin: Skin is warm and dry. Capillary refill takes less than 2 seconds. No erythema.   Psychiatric: She has a normal mood and affect. Her behavior is normal. Judgment and thought content normal.         ED Course   Procedures   Medical Decision Making:   History:   Old Records Summarized: records from another hospital.                      Clinical Impression:   Final diagnoses:  [N17.9] DUYEN (acute kidney injury) (Primary)  [N30.00] Acute cystitis without hematuria          ED Disposition Condition    Admit               Kashmir Box MD  07/21/22 1624

## 2022-07-20 NOTE — PLAN OF CARE
Problem: Adult Inpatient Plan of Care  Goal: Plan of Care Review  Outcome: Ongoing, Progressing  Goal: Patient-Specific Goal (Individualized)  Outcome: Ongoing, Progressing  Goal: Absence of Hospital-Acquired Illness or Injury  Outcome: Ongoing, Progressing  Goal: Optimal Comfort and Wellbeing  Outcome: Ongoing, Progressing  Goal: Readiness for Transition of Care  Outcome: Ongoing, Progressing     Problem: Bariatric Environmental Safety  Goal: Safety Maintained with Care  Outcome: Ongoing, Progressing     Problem: Gas Exchange Impaired  Goal: Optimal Gas Exchange  Outcome: Ongoing, Progressing

## 2022-07-20 NOTE — NURSING
07/20/22 1700   Post-Hemodialysis Assessment   Blood Volume Processed (Liters) 34.9 L   Dialyzer Clearance Moderately streaked   Total UF (mL) 0 mL   Patient Response to Treatment Tolerated well   Post-Hemodialysis Comments Tx completed, pt reinfused. CVC deaccessed per P&P, locked with NS and new Clear Guard caps applied. Pt ran for 2 hours, NET removed = 0ml.

## 2022-07-20 NOTE — CONSULTS
Nephrology Consultation    Date of Consultation:  July 20, 2022    Consultation Requested By:  Dr. Savage Kimble    Reason for Consultation:  Acute renal failure    History of Present Illness:  This is a 47 y.o. female was transferred from [facility] due to chief complaint of abdominal pain, shortness of breath nausea vomiting and 2 episodes of diarrhea.  Duration of symptoms is at least 24 hours prior to presentation.  She does relate that she has had decreased appetite for over a week. Eating or exertion worsened her symptoms.  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 Patient was previously seeing Dr. Hayley Calderon.  She reports compliance with Actemra and Ofev.  Patient is no longer taking prednisone.  Last year creatinine was 0.7.  Creatinine upon admission was 9.34.  Renal service consult for evaluation management of acute renal failure.    Review of patient's allergies indicates:  No Known Allergies    Review of systems: 12 point review of systems conducted, negative except as stated in the HPI.     Past Medical History:  antisynthetase syndrome ,ILD hypertension, vitamin-D deficiency, Raynaud's, inflammatory arthritis, peripheral neuropathy.    Procedure History: History reviewed. No pertinent surgical history.    Family History:  Family history reviewed noncontributory for this admission.    Social History:   no alcohol tobacco or drug use.    Home Medications:   Medications Prior to Admission   Medication Sig Dispense Refill Last Dose    gabapentin (NEURONTIN) 300 MG capsule Take 300 mg by mouth 2 (two) times a day.       nintedanib (OFEV) 150 mg Cap Ofev 150 mg capsule       tocilizumab (ACTEMRA) 162 mg/0.9 mL injection Inject 162 mg into the skin once a week.       albuterol (PROVENTIL) 2.5 mg /3 mL (0.083 %) nebulizer solution Take 3 mLs by nebulization every 8 (eight) hours as needed.       albuterol  (PROVENTIL/VENTOLIN HFA) 90 mcg/actuation inhaler Inhale 2 puffs into the lungs every 6 (six) hours.       amLODIPine (NORVASC) 10 MG tablet Take 10 mg by mouth once daily.       budesonide-formoterol 160-4.5 mcg (SYMBICORT) 160-4.5 mcg/actuation HFAA Inhale 1 puff into the lungs daily as needed.       diclofenac sodium (VOLTAREN) 1 % Gel Apply 1 Tube topically every 6 (six) hours as needed.       ergocalciferol (ERGOCALCIFEROL) 50,000 unit Cap Take 1 capsule by mouth Daily.       ezetimibe (ZETIA) 10 mg tablet Take 10 mg by mouth every evening.       furosemide (LASIX) 40 MG tablet Take 40 mg by mouth Daily.       loratadine (CLARITIN) 10 mg tablet Take 10 mg by mouth once daily at 6am.       meloxicam (MOBIC) 15 MG tablet Take 15 mg by mouth once daily.       metoprolol tartrate (LOPRESSOR) 100 MG tablet Take 100 mg by mouth once daily.       omeprazole (PRILOSEC) 20 MG capsule Take 20 mg by mouth once daily.       ondansetron (ZOFRAN-ODT) 4 MG TbDL Take 4 mg by mouth every 6 (six) hours as needed.       predniSONE (DELTASONE) 10 MG tablet Take 10 mg by mouth Daily.          Inpatient Medications:     Current Facility-Administered Medications:     albuterol-ipratropium 2.5 mg-0.5 mg/3 mL nebulizer solution 3 mL, 3 mL, Nebulization, Q6H PRN, Savage Kimble MD    [START ON 7/21/2022] ceFEPIme (MAXIPIME) 250 mg in sodium chloride 0.9 % 50 mL IVPB, 250 mg, Intravenous, Q24H, Savage Kimble MD    dextrose 10% bolus 125 mL, 12.5 g, Intravenous, PRN, Kashmir Box MD    dextrose 10% bolus 250 mL, 25 g, Intravenous, PRN, Kashmir Box MD    glucagon (human recombinant) injection 1 mg, 1 mg, Intramuscular, PRN, Savage Kimble MD    glucose chewable tablet 16 g, 16 g, Oral, PRN, Savage Kimble MD    glucose chewable tablet 24 g, 24 g, Oral, PRN, Savage Kimble MD    heparin (porcine) injection 5,000 Units, 5,000 Units, Subcutaneous, Q12H, Savage  Jose Kimble MD    metronidazole IVPB 500 mg, 500 mg, Intravenous, Q8H, Savage Kimble MD, Last Rate: 100 mL/hr at 07/20/22 0646, 500 mg at 07/20/22 0646    mupirocin 2 % ointment, , Nasal, BID, Kashmir Box MD    naloxone 0.4 mg/mL injection 0.02 mg, 0.02 mg, Intravenous, PRN, Savage Kimble MD    ondansetron injection 4 mg, 4 mg, Intravenous, Q8H PRN, Savage Kimble MD    sodium bicarbonate 100 mEq in sodium chloride 0.45% 1,000 mL infusion, , Intravenous, Continuous, Evie Richter MD    sodium chloride 0.9% flush 10 mL, 10 mL, Intravenous, Q12H PRN, Savage Kimble MD     Laboratory Data:   Recent Results (from the past 24 hour(s))   Comprehensive Metabolic Panel    Collection Time: 07/19/22 11:49 PM   Result Value Ref Range    Sodium Level 138 136 - 145 mmol/L    Potassium Level 3.0 (L) 3.5 - 5.1 mmol/L    Chloride 99 98 - 107 mmol/L    Carbon Dioxide 21 (L) 22 - 29 mmol/L    Glucose Level 81 74 - 100 mg/dL    Blood Urea Nitrogen 55.3 (H) 7.0 - 18.7 mg/dL    Creatinine 9.34 (H) 0.55 - 1.02 mg/dL    Calcium Level Total 8.6 8.4 - 10.2 mg/dL    Protein Total 7.2 6.4 - 8.3 gm/dL    Albumin Level 3.3 (L) 3.5 - 5.0 gm/dL    Globulin 3.9 (H) 2.4 - 3.5 gm/dL    Albumin/Globulin Ratio 0.8 (L) 1.1 - 2.0 ratio    Bilirubin Total 0.5 <=1.5 mg/dL    Alkaline Phosphatase 125 40 - 150 unit/L    Alanine Aminotransferase 188 (H) 0 - 55 unit/L    Aspartate Aminotransferase 71 (H) 5 - 34 unit/L    Estimated GFR- 6 mls/min/1.73/m2   CBC with Differential    Collection Time: 07/19/22 11:49 PM   Result Value Ref Range    WBC 19.0 (H) 4.5 - 11.5 x10(3)/mcL    RBC 5.18 4.20 - 5.40 x10(6)/mcL    Hgb 14.1 12.0 - 16.0 gm/dL    Hct 43.9 37.0 - 47.0 %    MCV 84.7 80.0 - 94.0 fL    MCH 27.2 27.0 - 31.0 pg    MCHC 32.1 (L) 33.0 - 36.0 mg/dL    RDW 15.0 11.5 - 17.0 %    Platelet 391 130 - 400 x10(3)/mcL    MPV 9.8 7.4 - 10.4 fL    IG# 0.10 (H) 0 - 0.04 x10(3)/mcL    IG%  0.5 %    NRBC% 0.0 %   Manual Differential    Collection Time: 07/19/22 11:49 PM   Result Value Ref Range    Neut Man 63 47 - 80 %    Lymph Man 26 13 - 40 %    Monocyte Man 5 2 - 11 %    Eos Man 5 0 - 8 %    Basophil Man 1 0 - 2 %    Abs Neut calc 11.97 (H) 2.1 - 9.2 x10(3)/mcL    Abs Baso 0.19 0 - 0.2 x10(3)/mcL    Abs Mono 0.95 0.1 - 1.3 x10(3)/mcL    Abs Lymp 4.94 (H) 0.6 - 4.6 x10(3)/mcL    Abs Eos  0.95 (H) 0 - 0.9 x10(3)/mcL    RBC Morph Normal Normal    Platelet Est Adequate Normal, Adequate   Protime-INR    Collection Time: 07/19/22 11:51 PM   Result Value Ref Range    PT 12.8 seconds    INR 0.98 0.00 - 1.30   Magnesium    Collection Time: 07/20/22  2:08 AM   Result Value Ref Range    Magnesium Level 1.90 1.60 - 2.60 mg/dL   Phosphorus    Collection Time: 07/20/22  2:08 AM   Result Value Ref Range    Phosphorus Level 5.9 (H) 2.3 - 4.7 mg/dL   Comprehensive Metabolic Panel (CMP)    Collection Time: 07/20/22  4:50 AM   Result Value Ref Range    Sodium Level 139 136 - 145 mmol/L    Potassium Level 3.7 3.5 - 5.1 mmol/L    Chloride 106 98 - 107 mmol/L    Carbon Dioxide 18 (L) 22 - 29 mmol/L    Glucose Level 66 (L) 74 - 100 mg/dL    Blood Urea Nitrogen 52.9 (H) 7.0 - 18.7 mg/dL    Creatinine 8.51 (H) 0.55 - 1.02 mg/dL    Calcium Level Total 7.7 (L) 8.4 - 10.2 mg/dL    Protein Total 6.2 (L) 6.4 - 8.3 gm/dL    Albumin Level 2.8 (L) 3.5 - 5.0 gm/dL    Globulin 3.4 2.4 - 3.5 gm/dL    Albumin/Globulin Ratio 0.8 (L) 1.1 - 2.0 ratio    Bilirubin Total 0.4 <=1.5 mg/dL    Alkaline Phosphatase 107 40 - 150 unit/L    Alanine Aminotransferase 146 (H) 0 - 55 unit/L    Aspartate Aminotransferase 52 (H) 5 - 34 unit/L    Estimated GFR- 6 mls/min/1.73/m2   CBC with Differential    Collection Time: 07/20/22  4:50 AM   Result Value Ref Range    WBC 19.3 (H) 4.5 - 11.5 x10(3)/mcL    RBC 4.78 4.20 - 5.40 x10(6)/mcL    Hgb 12.7 12.0 - 16.0 gm/dL    Hct 41.6 37.0 - 47.0 %    MCV 87.0 80.0 - 94.0 fL    MCH 26.6 (L) 27.0 -  "31.0 pg    MCHC 30.5 (L) 33.0 - 36.0 mg/dL    RDW 15.0 11.5 - 17.0 %    Platelet 348 130 - 400 x10(3)/mcL    MPV 10.5 (H) 7.4 - 10.4 fL    Neut % 55.6 %    Lymph % 34.7 %    Mono % 7.5 %    Eos % 1.3 %    Basophil % 0.3 %    Lymph # 6.72 (H) 0.6 - 4.6 x10(3)/mcL    Neut # 10.8 (H) 2.1 - 9.2 x10(3)/mcL    Mono # 1.45 (H) 0.1 - 1.3 x10(3)/mcL    Eos # 0.25 0 - 0.9 x10(3)/mcL    Baso # 0.05 0 - 0.2 x10(3)/mcL    IG# 0.11 (H) 0 - 0.04 x10(3)/mcL    IG% 0.6 %    NRBC% 0.0 %   Lactic Acid, Plasma    Collection Time: 07/20/22  4:55 AM   Result Value Ref Range    Lactic Acid Level 1.6 0.5 - 2.2 mmol/L       Imaging:  X-Ray Chest 1 View   Final Result      No acute cardiopulmonary process.  Findings of chronic lung disease.         Electronically signed by: Gabriel Clarke   Date:    07/20/2022   Time:    06:32      US Previous   Final Result      Xray Previous   Final Result      US Retroperitoneal Limited    (Results Pending)       Physical Exam:   BP 94/66   Pulse 94   Temp 97.9 °F (36.6 °C) (Oral)   Resp 17   Ht 4' 7" (1.397 m)   Wt 72.4 kg (159 lb 9.6 oz)   SpO2 98%   Breastfeeding No   BMI 37.09 kg/m²  Body mass index is 37.09 kg/m².   Vitals noted  General:  Patient is alert and oriented person place and time.  Pleasant no acute  Distress.  HEENT:  Normocephalic atraumatic pupils equal and reactive to light extraocular muscle intact bilaterally.  Mouth with dry mucous membranes appreciated no ulcers or lesions noted  Neck:  No JVD bruit thyromegaly adenopathy appreciated  Lungs:  Clear to auscultation bilaterally all fields  Cardiovascular:  Regular rate and rhythm no murmur gallop appreciated  Abdomen:  Positive bowel sounds all 4 quadrants morbidly obese  Extremities:  Trace edema lower extremities bilaterally no clubbing or cyanosis noted  Neurologic:  Cranial nerves 2-12 grossly intact no clonus asterixis noted  Integument:  Warm, dry no rash noted.    Impression/Plan:  1. Acute renal failure:  Unclear " whether oliguric versus nonoliguric as there is no urine output charted.  Will initiate workup to include renal ultrasound to rule out obstruction.  Will check serum INES ANCA of.  Will hold Mobic and any other nephrotoxic medications.  Patient does have hypotension and was hypotensive at previous hospital.  Etiology of acute renal failure is most likely multifactorial to include: intravascular volume depletion, chronic NSAID use, possible autoimmune etiology and  may have a component of ischemic acute tubular necrosis due to hypotension and hypoperfusion.  Patient does have uremic symptoms to include decreased appetite nausea vomiting.  I did discuss the fact that patient will likely need renal replacement therapy today if renal indices do not improve with IV fluid resuscitation.  Patient expressed understanding and willingness to proceed.  I will order repeat labs in discussed with patient.  Will check urinalysis urine protein creatinine urine sodium.  These tests have been ordered however urine has not been collected as of this time.  I would continue fluids at 150 mL/hr.  Renal dose all medications for patients creatinine clearance, especially Maxipime.    Patient may require kidney biopsy if she does not exhibit signs of renal function recovery.  She expresses understanding and willingness to proceed.    2. Anion gap Metabolic acidosis:  Bicarbonate is now 18. Anion gap is 15 and corrected for albumin is 18.  Will change IV fluids to half-normal saline with 100 mg of sodium bicarbonate at 150 mL/hr.  Metabolic acidosis is likely due to acute renal failure possible infection.    3. Hypokalemia:  Corrected with repletion.  Monitor.    4. Hypotension:  Hold antihypertensive medication regimen.  I have increased IV fluids to 150 mL/hr.  Goal to keep systolic blood pressure greater than 100 and map  Greater than 65.    5. Hyperphosphatemia:  Likely due to acute renal failure.  No need for phosphorus binder at this  time.  Will monitor.  Should decrease with dialysis or  Renal function recovery.    6. Transaminitis:  Unclear whether autoimmune in etiology versus medication induced.  Could also be hypoperfusion shock liver.  Will recheck CMP in a.m. will hold any statin.    7. Hypoglycemia:  Patient is not eating.  She may require D5 to be added to IV fluids.  Would recheck point of care glucose , if less than 80 would add D5 to IV fluids.    8. Proteinuria:  Nonnephrotic range proteinuria.  Check serum protein electrophoresis to rule out gammopathy.  INES and ANCA have been ordered.    Thank you very much for your consultation    Evie Richter M.D.  Nephrology    Addendum:  12:15 pm    Spoke with patient and gave her new lab results.  I discussed the fact the patient will need hemodialysis as uremic symptoms persist and renal indices have not improved.  In fact, metabolic acidosis has worsened.  Patient is agreeable to hemodialysis and willing to proceed.  I did explain the risks and benefits of hemodialysis.    Diagnosis:  Acute kidney injury with uremic symptoms and worsening metabolic acidosis.  GFR 6 serum bicarbonate 17.     I spoke to the attending resident Dr. Rey, and she will consult surgery for hemodialysis catheter placement.    Hemodialysis orders have been placed.    Evie Richter M.D.  Nephrology

## 2022-07-20 NOTE — PLAN OF CARE
Problem: Occupational Therapy  Goal: Occupational Therapy Goal  Description: Patient will perform grooming with standby assist while standing at sink.    Patient will perform toileting with mod I using toilet or BSC.    Patient will perform toilet transfer with SBA with use of grab bars or RW.    Patient will perform upper body dressing with setup assist while seated EOB.     Patient will perform lower body dressing with min assist to don socks/shoes while seated EOB.     Outcome: Ongoing, Progressing

## 2022-07-20 NOTE — PROCEDURES
[facility]  General Surgery  Procedure Note    Date of Procedure: 07/20/2022     Procedure: Dialysis Line Catheter    Surgeon(s) and Role:  Staff: Dr. Sanders  Resident(s): MD Jhonny Zaragoza MD    Pre-Operative Diagnosis: DUYEN    Post-Operative Diagnosis: Same    Anesthesia: Local    Operative Findings: as expected    Description of Technical Procedures:   After appropriate consents were obtained, the patient was laid supine and placed in Trendelenburg position. Patient's right neck was prepped and draped in sterile fashion.  A right internal jugular approach was performed.  A large bore needle was used to penetrate the skin and venous access was obtained under ultrasound guidance. A guidewire was inserted into the needle. A scalpel was used to enlarge the skin incision. Two sequential dilators were inserted over the guidewire into the central venous system. The guidewire and dilator were removed. The catheter was inserted into the dilated pathway and then was sutured down with nylon sutures. The patient tolerated the procedure without complications. A chest x-ray confirmed correct placement at right atrium.         Estimated Blood Loss (EBL): 5 cc           Implants: Dialysis Catheter    Complications: None            Condition: Good     Jhonyn Sepulveda MD   [facility] General Surgery, PGY1  07/20/2022 2:52 PM

## 2022-07-20 NOTE — PROGRESS NOTES
[facility] Surg Telemetry  Adult Nutrition  Progress Note    SUMMARY       Recommendations  1. Advance diet once medically appropriate per MD      2. Will add appropriate ONS once diet advanced       3. Will continue to monitor days NPO, diet adv, wt, labs    Goals: Meet greater than 75% of nutritional needs; Maintain wt throughout hospitalization  Nutrition Goal Status: new      Reason for Assessment  Reason For Assessment: identified at risk by screening criteria (MST score of 5)    Diagnosis: Acute renal failure    Anion gap Metabolic acidosis    Hypokalemia    Hypotension    Hyperphosphatemia    Transaminitis    Hypoglycemia    Relevant Medical History: antisynthetase syndrome ,ILD hypertension, vitamin-D deficiency, Raynaud's, inflammatory arthritis, peripheral neuropathy        Assessment and Plan  7/20: Visited pt; currently NPO. Pt denies N/V; reports some diarrhea. Pt reports poor po intake for weeks (~2-3 wks); reports eating jello. Pt reports some wt loss; states #. Pt reports wt increased to 280# d/t fluid retention and reports decreased to 180#, last weighed at admit; wt of 159# in chart; unsure accuracy. Pt reports she may have lost more weight, but unsure. Unable to obtain bed wt d/t bed being off. No muscle/fat loss noted. Noted plans to initiate HD once catheter placed.      Malnutrition Assessment  Malnutrition Type: acute illness or injury (N/A)  Weight Loss (Malnutrition):  (Unable to verify)  Energy Intake (Malnutrition): less than or equal to 50% for greater than or equal to 5 days  Subcutaneous Fat (Malnutrition):  (Does not meet criteria)  Muscle Mass (Malnutrition):  (Does not meet criteria)   Edema (Fluid Accumulation): 1-->trace       Nutrition Risk Screen  Nutrition Risk Screen: no indicators present    Nutrition/Diet History  Spiritual, Cultural Beliefs, Uatsdin Practices, Values that Affect Care: no  Food Allergies: NKFA  Factors Affecting Nutritional  "Intake: decreased appetite, diarrhea, NPO      Anthropometrics  Temp: 97.7 °F (36.5 °C)  Height Method: Estimated  Height: 4' 7" (139.7 cm)  Height (inches): 55 in  Weight Method: Estimated  Weight: 72.4 kg (159 lb 9.8 oz)  Weight (lb): 159.61 lb  Ideal Body Weight (IBW), Female: 75 lb  % Ideal Body Weight, Female (lb): 212.81 %  BMI (Calculated): 37.1  BMI Grade: 35 - 39.9 - obesity - grade II  Usual Body Weight (UBW), k.8 kg  % Usual Body Weight: 88.69  % Weight Change From Usual Weight: -11.49 %       Lab/Procedures/Meds  Pertinent Labs Comments: -CO2 18, Gluc 66, BUN 52.9, Creat 8.51, Wesley 7.7, Pro Tot 6.2, Alb 2.8, , AST 52, GFR 6, Phos 5.9    Pertinent Medications Comments: zofran, K bicarb, Na bicarb in D5/NaCl      Estimated/Assessed Needs  Weight Used For Calorie Calculations: 72.4 kg (159 lb 9.8 oz)  Energy Calorie Requirements (kcal): 9477-3209 kcals (25-28 kcal/kg)  Protein Requirements: 73g (1.0 g/kg; If HD initiated increase pro to 94g (1.3 g/kg))  Weight Used For Protein Calculations: 72.4 kg (159 lb 9.8 oz)  Fluid Requirements (mL): per MD; 1000mL + output if HD initiated  RDA Method (mL): 1810       Nutrition Prescription Ordered  Current Diet Order: NPO  Energy Calories Required: not meeting needs  Protein Required: not meeting needs  Fluid Required: not meeting needs      Evaluation of Received Nutrient/Fluid Intake  % Intake of Estimated Energy Needs: 0 - 25 %  % Meal Intake: NPO      Nutrition Problem  Inadequate Energy Intake    Related to (etiology):   Decreased ability to consume sufficient energy    Signs and Symptoms (as evidenced by):   NPO status, </= 50% po intake >5 days    Interventions(treatment strategy):  Collaboration with other providers    Nutrition Diagnosis Status:   New      Nutrition Risk  Level of Risk/Frequency of Follow-up: moderate     Monitor and Evaluation  Food and Nutrient Intake: energy intake  Food and Nutrient Adminstration: diet " order  Anthropometric Measurements: weight change  Biochemical Data, Medical Tests and Procedures: electrolyte and renal panel, glucose/endocrine profile     Nutrition Follow-Up  RD Follow-up?: Yes      Vandana Lindsay RD

## 2022-07-21 LAB
ALBUMIN SERPL-MCNC: 2.3 GM/DL (ref 3.5–5)
ALBUMIN/GLOB SERPL: 0.9 RATIO (ref 1.1–2)
ALP SERPL-CCNC: 74 UNIT/L (ref 40–150)
ALT SERPL-CCNC: 80 UNIT/L (ref 0–55)
ANION GAP SERPL CALC-SCNC: 6 MEQ/L
AST SERPL-CCNC: 26 UNIT/L (ref 5–34)
BASOPHILS # BLD AUTO: 0.06 X10(3)/MCL (ref 0–0.2)
BASOPHILS NFR BLD AUTO: 0.4 %
BILIRUBIN DIRECT+TOT PNL SERPL-MCNC: 0.4 MG/DL
BUN SERPL-MCNC: 10.1 MG/DL (ref 7–18.7)
BUN SERPL-MCNC: 25.9 MG/DL (ref 7–18.7)
CALCIUM SERPL-MCNC: 7.1 MG/DL (ref 8.4–10.2)
CALCIUM SERPL-MCNC: 8.1 MG/DL (ref 8.4–10.2)
CHLORIDE SERPL-SCNC: 102 MMOL/L (ref 98–107)
CHLORIDE SERPL-SCNC: 109 MMOL/L (ref 98–107)
CO2 SERPL-SCNC: 23 MMOL/L (ref 22–29)
CO2 SERPL-SCNC: 28 MMOL/L (ref 22–29)
CREAT SERPL-MCNC: 3.61 MG/DL (ref 0.55–1.02)
CREAT SERPL-MCNC: 5.99 MG/DL (ref 0.55–1.02)
CREAT/UREA NIT SERPL: 3
EOSINOPHIL # BLD AUTO: 0.57 X10(3)/MCL (ref 0–0.9)
EOSINOPHIL NFR BLD AUTO: 3.6 %
ERYTHROCYTE [DISTWIDTH] IN BLOOD BY AUTOMATED COUNT: 14.6 % (ref 11.5–17)
GLOBULIN SER-MCNC: 2.7 GM/DL (ref 2.4–3.5)
GLUCOSE SERPL-MCNC: 127 MG/DL (ref 74–100)
GLUCOSE SERPL-MCNC: 96 MG/DL (ref 74–100)
HCT VFR BLD AUTO: 34.6 % (ref 37–47)
HGB BLD-MCNC: 11.1 GM/DL (ref 12–16)
IMM GRANULOCYTES # BLD AUTO: 0.08 X10(3)/MCL (ref 0–0.04)
IMM GRANULOCYTES NFR BLD AUTO: 0.5 %
LYMPHOCYTES # BLD AUTO: 3.58 X10(3)/MCL (ref 0.6–4.6)
LYMPHOCYTES NFR BLD AUTO: 22.6 %
MAGNESIUM SERPL-MCNC: 1.5 MG/DL (ref 1.6–2.6)
MAGNESIUM SERPL-MCNC: 2.1 MG/DL (ref 1.6–2.6)
MCH RBC QN AUTO: 26.7 PG (ref 27–31)
MCHC RBC AUTO-ENTMCNC: 32.1 MG/DL (ref 33–36)
MCV RBC AUTO: 83.4 FL (ref 80–94)
MONOCYTES # BLD AUTO: 1.11 X10(3)/MCL (ref 0.1–1.3)
MONOCYTES NFR BLD AUTO: 7 %
NEUTROPHILS # BLD AUTO: 10.4 X10(3)/MCL (ref 2.1–9.2)
NEUTROPHILS NFR BLD AUTO: 65.9 %
NRBC BLD AUTO-RTO: 0 %
PLATELET # BLD AUTO: 273 X10(3)/MCL (ref 130–400)
PMV BLD AUTO: 10.5 FL (ref 7.4–10.4)
POTASSIUM SERPL-SCNC: 2.8 MMOL/L (ref 3.5–5.1)
POTASSIUM SERPL-SCNC: 4.9 MMOL/L (ref 3.5–5.1)
PROT SERPL-MCNC: 5 GM/DL (ref 6.4–8.3)
RBC # BLD AUTO: 4.15 X10(6)/MCL (ref 4.2–5.4)
SODIUM SERPL-SCNC: 138 MMOL/L (ref 136–145)
SODIUM SERPL-SCNC: 139 MMOL/L (ref 136–145)
WBC # SPEC AUTO: 15.8 X10(3)/MCL (ref 4.5–11.5)

## 2022-07-21 PROCEDURE — 36415 COLL VENOUS BLD VENIPUNCTURE: CPT | Performed by: STUDENT IN AN ORGANIZED HEALTH CARE EDUCATION/TRAINING PROGRAM

## 2022-07-21 PROCEDURE — 85025 COMPLETE CBC W/AUTO DIFF WBC: CPT | Performed by: STUDENT IN AN ORGANIZED HEALTH CARE EDUCATION/TRAINING PROGRAM

## 2022-07-21 PROCEDURE — S0030 INJECTION, METRONIDAZOLE: HCPCS | Performed by: STUDENT IN AN ORGANIZED HEALTH CARE EDUCATION/TRAINING PROGRAM

## 2022-07-21 PROCEDURE — 80100014 HC HEMODIALYSIS 1:1

## 2022-07-21 PROCEDURE — 27000221 HC OXYGEN, UP TO 24 HOURS

## 2022-07-21 PROCEDURE — 63600175 PHARM REV CODE 636 W HCPCS: Performed by: STUDENT IN AN ORGANIZED HEALTH CARE EDUCATION/TRAINING PROGRAM

## 2022-07-21 PROCEDURE — 25000003 PHARM REV CODE 250: Performed by: NURSE PRACTITIONER

## 2022-07-21 PROCEDURE — 94760 N-INVAS EAR/PLS OXIMETRY 1: CPT

## 2022-07-21 PROCEDURE — 83735 ASSAY OF MAGNESIUM: CPT | Performed by: STUDENT IN AN ORGANIZED HEALTH CARE EDUCATION/TRAINING PROGRAM

## 2022-07-21 PROCEDURE — S5010 5% DEXTROSE AND 0.45% SALINE: HCPCS | Performed by: INTERNAL MEDICINE

## 2022-07-21 PROCEDURE — 25000003 PHARM REV CODE 250: Performed by: STUDENT IN AN ORGANIZED HEALTH CARE EDUCATION/TRAINING PROGRAM

## 2022-07-21 PROCEDURE — 11000001 HC ACUTE MED/SURG PRIVATE ROOM

## 2022-07-21 PROCEDURE — 25000003 PHARM REV CODE 250: Performed by: INTERNAL MEDICINE

## 2022-07-21 PROCEDURE — 80053 COMPREHEN METABOLIC PANEL: CPT | Performed by: STUDENT IN AN ORGANIZED HEALTH CARE EDUCATION/TRAINING PROGRAM

## 2022-07-21 PROCEDURE — 94761 N-INVAS EAR/PLS OXIMETRY MLT: CPT

## 2022-07-21 PROCEDURE — 99900035 HC TECH TIME PER 15 MIN (STAT)

## 2022-07-21 RX ORDER — MAGNESIUM SULFATE 1 G/100ML
1 INJECTION INTRAVENOUS ONCE
Status: COMPLETED | OUTPATIENT
Start: 2022-07-21 | End: 2022-07-21

## 2022-07-21 RX ORDER — POTASSIUM CHLORIDE 20 MEQ/1
40 TABLET, EXTENDED RELEASE ORAL ONCE
Status: COMPLETED | OUTPATIENT
Start: 2022-07-21 | End: 2022-07-21

## 2022-07-21 RX ORDER — CIPROFLOXACIN 500 MG/1
TABLET ORAL
Status: ON HOLD | COMMUNITY
Start: 2022-07-13 | End: 2022-07-28 | Stop reason: HOSPADM

## 2022-07-21 RX ORDER — METHYLPREDNISOLONE 4 MG/1
TABLET ORAL
Status: ON HOLD | COMMUNITY
Start: 2022-04-15 | End: 2022-07-28 | Stop reason: HOSPADM

## 2022-07-21 RX ORDER — SODIUM CHLORIDE 9 MG/ML
INJECTION, SOLUTION INTRAVENOUS CONTINUOUS
Status: DISCONTINUED | OUTPATIENT
Start: 2022-07-21 | End: 2022-07-23

## 2022-07-21 RX ORDER — ALBUTEROL SULFATE 90 UG/1
AEROSOL, METERED RESPIRATORY (INHALATION)
COMMUNITY
End: 2022-09-14 | Stop reason: SDUPTHER

## 2022-07-21 RX ORDER — ALBUTEROL SULFATE 0.83 MG/ML
SOLUTION RESPIRATORY (INHALATION)
COMMUNITY
End: 2022-09-14 | Stop reason: SDUPTHER

## 2022-07-21 RX ORDER — HYDROXYCHLOROQUINE SULFATE 200 MG/1
200 TABLET, FILM COATED ORAL
COMMUNITY
Start: 2021-12-02 | End: 2022-09-14 | Stop reason: SDUPTHER

## 2022-07-21 RX ORDER — IPRATROPIUM BROMIDE AND ALBUTEROL SULFATE 2.5; .5 MG/3ML; MG/3ML
3 SOLUTION RESPIRATORY (INHALATION) EVERY 8 HOURS PRN
COMMUNITY
Start: 2021-12-23

## 2022-07-21 RX ORDER — POTASSIUM CHLORIDE 20 MEQ/1
TABLET, EXTENDED RELEASE ORAL
Status: ON HOLD | COMMUNITY
Start: 2022-06-10 | End: 2022-07-28 | Stop reason: SDUPTHER

## 2022-07-21 RX ORDER — POTASSIUM CHLORIDE 20 MEQ/1
40 TABLET, EXTENDED RELEASE ORAL 2 TIMES DAILY
Status: COMPLETED | OUTPATIENT
Start: 2022-07-21 | End: 2022-07-21

## 2022-07-21 RX ADMIN — MAGNESIUM SULFATE IN DEXTROSE 1 G: 10 INJECTION, SOLUTION INTRAVENOUS at 06:07

## 2022-07-21 RX ADMIN — MUPIROCIN: 20 OINTMENT TOPICAL at 09:07

## 2022-07-21 RX ADMIN — METRONIDAZOLE 500 MG: 500 INJECTION, SOLUTION INTRAVENOUS at 11:07

## 2022-07-21 RX ADMIN — CEFEPIME 250 MG: 1 INJECTION, POWDER, FOR SOLUTION INTRAMUSCULAR; INTRAVENOUS at 04:07

## 2022-07-21 RX ADMIN — HEPARIN SODIUM 5000 UNITS: 5000 INJECTION, SOLUTION INTRAVENOUS; SUBCUTANEOUS at 09:07

## 2022-07-21 RX ADMIN — POTASSIUM CHLORIDE 40 MEQ: 1500 TABLET, EXTENDED RELEASE ORAL at 06:07

## 2022-07-21 RX ADMIN — SODIUM CHLORIDE: 9 INJECTION, SOLUTION INTRAVENOUS at 07:07

## 2022-07-21 RX ADMIN — SODIUM BICARBONATE: 84 INJECTION, SOLUTION INTRAVENOUS at 03:07

## 2022-07-21 RX ADMIN — METRONIDAZOLE 500 MG: 500 INJECTION, SOLUTION INTRAVENOUS at 06:07

## 2022-07-21 RX ADMIN — POTASSIUM CHLORIDE 40 MEQ: 1500 TABLET, EXTENDED RELEASE ORAL at 11:07

## 2022-07-21 RX ADMIN — MAGNESIUM SULFATE IN DEXTROSE 1 G: 10 INJECTION, SOLUTION INTRAVENOUS at 10:07

## 2022-07-21 RX ADMIN — HEPARIN SODIUM 5000 UNITS: 5000 INJECTION, SOLUTION INTRAVENOUS; SUBCUTANEOUS at 08:07

## 2022-07-21 RX ADMIN — METRONIDAZOLE 500 MG: 500 INJECTION, SOLUTION INTRAVENOUS at 01:07

## 2022-07-21 RX ADMIN — POTASSIUM CHLORIDE 40 MEQ: 1500 TABLET, EXTENDED RELEASE ORAL at 09:07

## 2022-07-21 NOTE — PROGRESS NOTES
"Nephrology Progress Note    Hospital course:  47-year-old  female with acute renal failure requiring hemodialysis for clearance purposes yesterday.  Patient had severe uremic symptoms including intractable nausea and vomiting.  She did tolerate dialysis well yesterday.  To her dialysis treatment with no fluid removal.  Patient with low blood pressure overnight.    Subjective:   This morning, patient relates that she still feels weak but nausea is slowly improving.    Objective:   BP 92/63   Pulse 96   Temp 98 °F (36.7 °C) (Oral)   Resp 20   Ht 4' 7" (1.397 m)   Wt 72.4 kg (159 lb 9.8 oz)   SpO2 99%   Breastfeeding No   BMI 37.10 kg/m²     Intake/Output Summary (Last 24 hours) at 7/21/2022 0906  Last data filed at 7/20/2022 1700  Gross per 24 hour   Intake --   Output 200 ml   Net -200 ml        Physical exam:  Vitals noted  General:  Patient is alert and oriented person place and time no acute distress  HEENT:  Normocephalic atraumatic pupils equal round reactive to light mouth:  No oral ulcers or lesions noted  Neck:  No JVD bruit thyromegaly adenopathy appreciated  Lungs:  Clear to auscultation bilaterally all fields  CVS:  Regular rate and rhythm no murmur rub gallop appreciated  Abdomen:  Positive bowel sounds all 4 quadrants soft nontender nondistended  Extremities:  No clubbing cyanosis or edema noted  Neurologic:  No clonus asterixis appreciated.  Cranial nerves 2 trial grossly intact    Laboratory data:   Recent Results (from the past 24 hour(s))   Urinalysis, Reflex to Urine Culture Urine, Clean Catch    Collection Time: 07/20/22  9:09 AM    Specimen: Urine, Clean Catch   Result Value Ref Range    Color, UA Light-Yellow (A) Yellow, Colorless, Other, Clear    Appearance, UA Turbid (A) Clear    Specific Gravity, UA 1.009     pH, UA 5.0 5.0, 5.5, 6.0, 6.5, 7.0, 7.5, 8.0, 8.5    Protein, UA 1+ (A) Negative, 300  mg/dL    Glucose, UA Normal Negative, Normal mg/dL    Ketones, UA Negative " Negative, +1, +2, +3, +4, +5, >=160, >=80 mg/dL    Blood, UA Negative Negative unit/L    Bilirubin, UA Negative Negative mg/dL    Urobilinogen, UA Normal 0.2, 1.0, Normal mg/dL    Nitrites, UA Negative Negative    Leukocyte Esterase,   (A) Negative, 75  unit/L    WBC, UA 11-20 (A) None Seen, 0-2, 3-5, 0-5 /HPF    Bacteria, UA Trace (A) None Seen /HPF    Squamous Epithelial Cells, UA Few (A) None Seen /HPF    Mucous, UA Trace (A) None Seen /LPF    Hyaline Casts, UA 3-5 (A) None Seen /lpf    RBC, UA None Seen None Seen, 0-2, 3-5, 0-5 /HPF   Protein / creatinine ratio, urine    Collection Time: 07/20/22  9:09 AM   Result Value Ref Range    Urine Protein Level 33.4 mg/dL    Urine Creatinine 110.4 (H) 47.0 - 110.0 mg/dL    Urine Protein/Creatinine Ratio 302.5 (H) <=200.0 mg/gm Cr   Sodium, urine, random    Collection Time: 07/20/22  9:09 AM   Result Value Ref Range    Urine Sodium 34.0 mmol/L   Urine culture    Collection Time: 07/20/22  9:09 AM    Specimen: Urine, Clean Catch   Result Value Ref Range    Urine Culture No Growth At 24 Hours    Comprehensive Metabolic Panel    Collection Time: 07/20/22 10:07 AM   Result Value Ref Range    Sodium Level 139 136 - 145 mmol/L    Potassium Level 3.6 3.5 - 5.1 mmol/L    Chloride 106 98 - 107 mmol/L    Carbon Dioxide 17 (L) 22 - 29 mmol/L    Glucose Level 74 74 - 100 mg/dL    Blood Urea Nitrogen 52.2 (H) 7.0 - 18.7 mg/dL    Creatinine 8.61 (H) 0.55 - 1.02 mg/dL    Calcium Level Total 7.9 (L) 8.4 - 10.2 mg/dL    Protein Total 6.5 6.4 - 8.3 gm/dL    Albumin Level 2.9 (L) 3.5 - 5.0 gm/dL    Globulin 3.6 (H) 2.4 - 3.5 gm/dL    Albumin/Globulin Ratio 0.8 (L) 1.1 - 2.0 ratio    Bilirubin Total 0.4 <=1.5 mg/dL    Alkaline Phosphatase 101 40 - 150 unit/L    Alanine Aminotransferase 137 (H) 0 - 55 unit/L    Aspartate Aminotransferase 43 (H) 5 - 34 unit/L    Estimated GFR- 6 mls/min/1.73/m2   Hepatitis B Surface Antigen    Collection Time: 07/20/22  2:23 PM   Result  Value Ref Range    Hepatitis B Surface Antigen Nonreactive Nonreactive   Comprehensive Metabolic Panel (CMP)    Collection Time: 07/21/22  4:35 AM   Result Value Ref Range    Sodium Level 139 136 - 145 mmol/L    Potassium Level 2.8 (L) 3.5 - 5.1 mmol/L    Chloride 102 98 - 107 mmol/L    Carbon Dioxide 28 22 - 29 mmol/L    Glucose Level 127 (H) 74 - 100 mg/dL    Blood Urea Nitrogen 25.9 (H) 7.0 - 18.7 mg/dL    Creatinine 5.99 (H) 0.55 - 1.02 mg/dL    Calcium Level Total 7.1 (L) 8.4 - 10.2 mg/dL    Protein Total 5.0 (L) 6.4 - 8.3 gm/dL    Albumin Level 2.3 (L) 3.5 - 5.0 gm/dL    Globulin 2.7 2.4 - 3.5 gm/dL    Albumin/Globulin Ratio 0.9 (L) 1.1 - 2.0 ratio    Bilirubin Total 0.4 <=1.5 mg/dL    Alkaline Phosphatase 74 40 - 150 unit/L    Alanine Aminotransferase 80 (H) 0 - 55 unit/L    Aspartate Aminotransferase 26 5 - 34 unit/L    Estimated GFR- 10 mls/min/1.73/m2   CBC with Differential    Collection Time: 07/21/22  4:35 AM   Result Value Ref Range    WBC 15.8 (H) 4.5 - 11.5 x10(3)/mcL    RBC 4.15 (L) 4.20 - 5.40 x10(6)/mcL    Hgb 11.1 (L) 12.0 - 16.0 gm/dL    Hct 34.6 (L) 37.0 - 47.0 %    MCV 83.4 80.0 - 94.0 fL    MCH 26.7 (L) 27.0 - 31.0 pg    MCHC 32.1 (L) 33.0 - 36.0 mg/dL    RDW 14.6 11.5 - 17.0 %    Platelet 273 130 - 400 x10(3)/mcL    MPV 10.5 (H) 7.4 - 10.4 fL    Neut % 65.9 %    Lymph % 22.6 %    Mono % 7.0 %    Eos % 3.6 %    Basophil % 0.4 %    Lymph # 3.58 0.6 - 4.6 x10(3)/mcL    Neut # 10.4 (H) 2.1 - 9.2 x10(3)/mcL    Mono # 1.11 0.1 - 1.3 x10(3)/mcL    Eos # 0.57 0 - 0.9 x10(3)/mcL    Baso # 0.06 0 - 0.2 x10(3)/mcL    IG# 0.08 (H) 0 - 0.04 x10(3)/mcL    IG% 0.5 %    NRBC% 0.0 %   Magnesium    Collection Time: 07/21/22  6:48 AM   Result Value Ref Range    Magnesium Level 1.50 (L) 1.60 - 2.60 mg/dL       Imaging:   Imaging Results          X-Ray Chest 1 View (Final result)  Result time 07/20/22 06:32:04   Procedure changed from X-Ray Chest PA And Lateral     Final result by Gabriel DAHL  MD Vince (07/20/22 06:32:04)                 Impression:      No acute cardiopulmonary process.  Findings of chronic lung disease.      Electronically signed by: Gabriel Clarke  Date:    07/20/2022  Time:    06:32             Narrative:    EXAMINATION:  XR CHEST 1 VIEW    CLINICAL HISTORY:  Shortness of breath;SOB;    TECHNIQUE:  Single view of the chest    COMPARISON:  07/19/2022    FINDINGS:  Prominent interstitial markings with no focal opacification.    The cardiomediastinal silhouette is within normal limits.    No acute osseous abnormality.                                 Medications:     Current Facility-Administered Medications:     0.9%  NaCl infusion, , Intravenous, Continuous, Evie Richter MD, Last Rate: 100 mL/hr at 07/21/22 0726, New Bag at 07/21/22 0726    acetaminophen tablet 1,000 mg, 1,000 mg, Oral, Q6H PRN, Molly Rey MD    albuterol-ipratropium 2.5 mg-0.5 mg/3 mL nebulizer solution 3 mL, 3 mL, Nebulization, Q6H PRN, Savage Kimble MD    ceFEPIme (MAXIPIME) 250 mg in sodium chloride 0.9 % 50 mL IVPB, 250 mg, Intravenous, Q24H, Savage Kimble MD, Stopped at 07/21/22 0459    dextrose 10% bolus 125 mL, 12.5 g, Intravenous, PRN, Kashmir Box MD    dextrose 10% bolus 250 mL, 25 g, Intravenous, PRN, Kashmir Box MD    glucagon (human recombinant) injection 1 mg, 1 mg, Intramuscular, PRN, Savage Kimble MD    glucose chewable tablet 16 g, 16 g, Oral, PRN, Savage Kimble MD    glucose chewable tablet 24 g, 24 g, Oral, PRN, Savage Kimble MD    heparin (porcine) injection 5,000 Units, 5,000 Units, Subcutaneous, Q12H, Savage Kimble MD, 5,000 Units at 07/21/22 0844    hydrOXYzine pamoate capsule 25 mg, 25 mg, Oral, Nightly PRN, Hai Bills MD, 25 mg at 07/20/22 2057    melatonin tablet 6 mg, 6 mg, Oral, Nightly PRN, Hai Bills MD, 6 mg at 07/20/22 2056    metronidazole IVPB 500 mg, 500 mg, Intravenous,  Q8H, Savage Kimble MD, Stopped at 07/21/22 0740    mupirocin 2 % ointment, , Nasal, BID, Kashmir Box MD, Given at 07/21/22 0900    naloxone 0.4 mg/mL injection 0.02 mg, 0.02 mg, Intravenous, PRN, Savage Kimble MD    ondansetron injection 4 mg, 4 mg, Intravenous, Q8H PRN, Savage Kimble MD    potassium chloride SA CR tablet 40 mEq, 40 mEq, Oral, BID, Lexie Shane, FNP    sodium chloride 0.9% flush 10 mL, 10 mL, Intravenous, Q12H PRN, Savage Kimble MD     Impression/Plan:    1. Hypomagnesemia:  Replete with magnesium sulfate IV and recheck level in a.m.    2. Acute renal failure:  Unclear whether oliguric versus nonoliguric as intake and output have not been recorded accurately.  Patient will require hemodialysis today for clearance.  3 hours no ultrafiltration.  Will monitor for signs of renal function recovery.  Etiology of acute renal failure still not determined.  Serology is pending.  Patient did have low blood pressures so the question of ischemic acute tubular necrosis is certainly a possibility.  Patient will likely need a kidney biopsy to determine etiology of acute renal failure.  Renal ultrasound revealed no hydronephrosis.    3. Hypokalemia:  Agree with repletion 40 mEq p.o. .  Will use 4 potassium bath with hemodialysis today.    4. Metabolic acidosis:  Corrected with dialysis and bicarbonate IV.  Bicarbonate IV has been discontinued IV fluids were changed to normal saline.    5. Proteinuria:  Nonnephrotic range.  Serum protein electrophoresis has been ordered to rule out gammopathy.    Evie Richter M.D.  Nephrology is

## 2022-07-21 NOTE — PROGRESS NOTES
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 Calderon. Presents to ED as a transfer from [place] for ARF. She reports she began experiencing SOB around 5AM. Denies any f/c, cough, orthopnea, leg swelling. She has some SOB at baseline, uses 3L home O2 but reports this was worse than usual. She has a hx of recurrent PNA requiring hospitalization. She also reports 2-3 week histroy of generalized fatigue, n/v, and diarrhea. Having around 2 episodes of emesis per day, associated w/ meals, difficulty tolerating PO intake, also having multiple episodes of loose watery stools daily. Denies any blood in the vomit/stool. Denies chest pain, abdominal pain, dysuria. Reports she is urinating less often and smaller volume. She has not seen Dr. Calderon since last December however she reports compliance w/ her Actemra and Ofev. No longer taking prednisone daily since that time. She denies any recent sick contacts. Last hospitalized 1 month ago w/ PNA.     At outside facility, work up showed elevated WBC 15, BUN/Crt 54/9, no electrolyte abnormalities. Troponin, BNP WNL. COVID negative. UA showed WBCs and bacteria. She was given 1L NS and Rocephin x1, trasnfered to [facility] for ARF. In ED, /75, afebrile, O2 100% on 3L. On my interview, she is alert and oriented x3, in no distress, somewhat lethargic. Answers all questions appropriately. Reports her SOB has since resolved and has no acute complaints. Repeat labs here show increase in WBC to 19, BUN/Crt 55/9, AST 71, . CXR shows increased interstitial markings but no obvious opacities. Renal US unremarkable. Medicine consulted for ARF.     Interval Hx 7/21/22: No acute events  overnight. Dialysis catheter was successfully placed on 7/20. Patient tolerated procedure well. Received a session of dialysis yesterday. Will receive another session today. She denies fever, chills, headache, SOB, chest pain, abdominal pain, and N/V.     No current facility-administered medications on file prior to encounter.     Current Outpatient Medications on File Prior to Encounter   Medication Sig    gabapentin (NEURONTIN) 300 MG capsule Take 300 mg by mouth 2 (two) times a day.    nintedanib (OFEV) 150 mg Cap Ofev 150 mg capsule    tocilizumab (ACTEMRA) 162 mg/0.9 mL injection Inject 162 mg into the skin once a week.    albuterol (PROVENTIL) 2.5 mg /3 mL (0.083 %) nebulizer solution Take 3 mLs by nebulization every 8 (eight) hours as needed.    albuterol (PROVENTIL/VENTOLIN HFA) 90 mcg/actuation inhaler Inhale 2 puffs into the lungs every 6 (six) hours.    amLODIPine (NORVASC) 10 MG tablet Take 10 mg by mouth once daily.    budesonide-formoterol 160-4.5 mcg (SYMBICORT) 160-4.5 mcg/actuation HFAA Inhale 1 puff into the lungs daily as needed.    diclofenac sodium (VOLTAREN) 1 % Gel Apply 1 Tube topically every 6 (six) hours as needed.    ergocalciferol (ERGOCALCIFEROL) 50,000 unit Cap Take 1 capsule by mouth Daily.    ezetimibe (ZETIA) 10 mg tablet Take 10 mg by mouth every evening.    furosemide (LASIX) 40 MG tablet Take 40 mg by mouth Daily.    loratadine (CLARITIN) 10 mg tablet Take 10 mg by mouth once daily at 6am.    meloxicam (MOBIC) 15 MG tablet Take 15 mg by mouth once daily.    metoprolol tartrate (LOPRESSOR) 100 MG tablet Take 100 mg by mouth once daily.    omeprazole (PRILOSEC) 20 MG capsule Take 20 mg by mouth once daily.    ondansetron (ZOFRAN-ODT) 4 MG TbDL Take 4 mg by mouth every 6 (six) hours as needed.    predniSONE (DELTASONE) 10 MG tablet Take 10 mg by mouth Daily.     Family History    None       Tobacco Use    Smoking status: Not on file    Smokeless tobacco: Not on  file   Substance and Sexual Activity    Alcohol use: Not on file    Drug use: Not on file    Sexual activity: Not on file     ROS  See above       Objective:     Vital Signs (Most Recent):  Temp: 98 °F (36.7 °C) (07/21/22 0724)  Pulse: 96 (07/21/22 0724)  Resp: 20 (07/21/22 0724)  BP: 92/63 (07/21/22 0724)  SpO2: 99 % (07/21/22 0900) Vital Signs (24h Range):  Temp:  [97.8 °F (36.6 °C)-98.7 °F (37.1 °C)] 98 °F (36.7 °C)  Pulse:  [] 96  Resp:  [18-20] 20  SpO2:  [93 %-100 %] 99 %  BP: ()/(50-72) 92/63     Weight: 72.4 kg (159 lb 9.8 oz)  Body mass index is 37.1 kg/m².    Physical Exam  Constitutional:       General: She is not in acute distress.     Appearance: She is obese. She is not toxic-appearing.   HENT:      Head: Normocephalic and atraumatic.      Nose: Nose normal. No congestion.      Mouth/Throat:      Pharynx: Oropharynx is clear. No oropharyngeal exudate.   Eyes:      Extraocular Movements: Extraocular movements intact.      Conjunctiva/sclera: Conjunctivae normal.      Pupils: Pupils are equal, round, and reactive to light.   Neck:      Vascular: No carotid bruit.   Cardiovascular:      Rate and Rhythm: Normal rate and regular rhythm.      Pulses: Normal pulses.      Heart sounds: No murmur heard.    No gallop.   Pulmonary:      Effort: Pulmonary effort is normal. No respiratory distress.      Breath sounds: Normal breath sounds.   Abdominal:      General: Abdomen is flat. Bowel sounds are normal. There is no distension.      Palpations: Abdomen is soft.      Tenderness: There is no abdominal tenderness. There is no right CVA tenderness, left CVA tenderness or guarding.   Musculoskeletal:         General: No deformity.      Right lower leg: No edema.      Left lower leg: No edema.   Skin:     General: Skin is cool.      Capillary Refill: Capillary refill takes 2 to 3 seconds.      Coloration: Skin is not jaundiced.      Findings: No erythema or rash.   Neurological:      General: No focal  deficit present.      Mental Status: She is alert and oriented to person, place, and time.      Sensory: No sensory deficit.      Motor: No weakness.       Assessment/Plan:   ARF   -On initial presentation BUN/Cr 55.3/9.34  -This AM BUN/Cr 25.9/5.99  -May be secondary to volume depletion given hx of n/v and diarrhea, however BUN/Crt ratio <20   -Differential includes AIN vs glomerulonephritis 2/2 to rheumatologic disease   -Continue IVF with 0.9% NaCl at 100 ml/hr  -Pending urine Na, prt/crt, ANCA, C3/C4, SPEP   -May ultimately need high dose steroids, renal bx   -Surgery consulted for dialysis catheter, catheter placed on 7/20. Received first dialysis session on 7/20, zero ml removed. Will receive another of dialysis today  -NPO at midnight for IR kidney biopsy   -Nephrology consulted, appreciate them on the case and following. Thank you for all the recommedations    Leukocytosis, down trending   UTI   Hx of recurrent PNAs   -Received Rocephin at outside facility   -WBC increased to 19, reports she has not been on PO steroids for months now  -On immuno modulators   -Lactic acid WNL    -Blood cultures pending. UCx showed NG at 24 hrs   -Continue broad spectrum abx for now awaiting results    Antisythetase syndrome  -Serology is pending   -Will discuss with patient regarding referral to [facility] to follow with Rheumatology as patient has not seen a Rheumatologist in over a year     Code: FULL code   DVT: Heparin   Lines: PIV   Abx: Cefepime, flagyl   Fluids: 0.9% NaCl at 100 ml/hr    Dispo: 48 y/o female admitted to Medicine for ARF. Hx of Antisythetase syndrome; serology pending. NPO at midnight for kidney biopsy tomorrow with IR. Received dialysis yesterday and will receive another session today. Continue to monitor closely.       Molly Rey MD  [facility], Department of Family Medicine

## 2022-07-21 NOTE — PROGRESS NOTES
07/21/22 1125   Post-Hemodialysis Assessment   Blood Volume Processed (Liters) 57.3 L   Dialyzer Clearance Lightly streaked   Additional Fluid Intake (mL) 500 mL   Total UF (mL) 900 mL   Net Fluid Removal 400   Patient Response to Treatment Tx completed, ended 14 minutes early due to alarming, RODNEY Shane Np aware, no orders given, rcvc flushed and then packed with saline to filling volume with new end caps, 166 minute treatment   Post-Hemodialysis Comments no distress noted

## 2022-07-21 NOTE — TELEPHONE ENCOUNTER
7/21/2022 - Received request for refill of Actemra   Reviewed chart and noted pt was scheduled to see Dr. Starr-Rheumatology 5/16/2022  Called Dr. Starr's office to see if pt had appt  Staff at lunch, will f/u another time to confirm    7/27/2022 - called Dr. Starr's office  Pt had 2 new patient appts and was no show for both  Pt will not be rescheduled  Tried to call pt to f/u  Called [phone], not in service

## 2022-07-21 NOTE — LETTER
July 28, 2022    Ofelia Brady  [address] [address]             [facility]   [address] [place] [address]  [phone]     Dear Ms. Brady:    We've been trying to reach you to discuss Rheumatology follow up.  The only phone number we have for you is not in service ([phone])    Please call me at [phone] or call the clinic at [phone].      Sincerely,          Sarai Valles RN   Rheumatology

## 2022-07-22 ENCOUNTER — TELEPHONE (OUTPATIENT)
Dept: INTERVENTIONAL RADIOLOGY/VASCULAR | Facility: HOSPITAL | Age: 47
End: 2022-07-22

## 2022-07-22 LAB
ALBUMIN % SPEP (OHS): 44.3 (ref 48.1–59.5)
ALBUMIN SERPL-MCNC: 2.5 GM/DL (ref 3.5–5)
ALBUMIN SERPL-MCNC: 2.9 GM/DL (ref 3.5–5)
ALBUMIN/GLOB SERPL: 0.8 RATIO (ref 1.1–2)
ALBUMIN/GLOB SERPL: 0.9 RATIO (ref 1.1–2)
ALP SERPL-CCNC: 84 UNIT/L (ref 40–150)
ALPHA 1 GLOB (OHS): 0.2 GM/DL (ref 0–0.4)
ALPHA 1 GLOB% (OHS): 3.1 (ref 2.3–4.9)
ALPHA 2 GLOB % (OHS): 15 (ref 6.9–13)
ALPHA 2 GLOB (OHS): 1 GM/DL (ref 0.4–1)
ALT SERPL-CCNC: 65 UNIT/L (ref 0–55)
ANCA AB SER QL IF: NEGATIVE
AST SERPL-CCNC: 26 UNIT/L (ref 5–34)
BACTERIA UR CULT: NORMAL
BASOPHILS # BLD AUTO: 0.06 X10(3)/MCL (ref 0–0.2)
BASOPHILS NFR BLD AUTO: 0.5 %
BETA GLOB (OHS): 1.2 GM/DL (ref 0.7–1.3)
BETA GLOB% (OHS): 18.8 (ref 13.8–19.7)
BILIRUBIN DIRECT+TOT PNL SERPL-MCNC: 0.3 MG/DL
BUN SERPL-MCNC: 10.1 MG/DL (ref 7–18.7)
CALCIUM SERPL-MCNC: 8 MG/DL (ref 8.4–10.2)
CHLORIDE SERPL-SCNC: 114 MMOL/L (ref 98–107)
CO2 SERPL-SCNC: 17 MMOL/L (ref 22–29)
CREAT SERPL-MCNC: 4.29 MG/DL (ref 0.55–1.02)
DSDNA IGG SERPL IA-ACNC: <12.3 IU/ML
EOSINOPHIL # BLD AUTO: 0.59 X10(3)/MCL (ref 0–0.9)
EOSINOPHIL NFR BLD AUTO: 4.7 %
ERYTHROCYTE [DISTWIDTH] IN BLOOD BY AUTOMATED COUNT: 15.4 % (ref 11.5–17)
GAMMA GLOBULIN % (OHS): 18.8 (ref 10.1–21.9)
GAMMA GLOBULIN (OHS): 1.2 GM/DL (ref 0.4–1.8)
GLOBULIN SER-MCNC: 2.9 GM/DL (ref 2.4–3.5)
GLOBULIN SER-MCNC: 3.6 GM/DL (ref 2.4–3.5)
GLUCOSE SERPL-MCNC: 87 MG/DL (ref 74–100)
HBV SURFACE AB SER QL IA: NEGATIVE
HBV SURFACE AB SERPL IA-ACNC: <5 MIU/ML
HCT VFR BLD AUTO: 34.3 % (ref 37–47)
HGB BLD-MCNC: 10.8 GM/DL (ref 12–16)
HIV 1+2 AB+HIV1 P24 AG SERPL QL IA: NONREACTIVE
IMM GRANULOCYTES # BLD AUTO: 0.07 X10(3)/MCL (ref 0–0.04)
IMM GRANULOCYTES NFR BLD AUTO: 0.6 %
LYMPHOCYTES # BLD AUTO: 2.75 X10(3)/MCL (ref 0.6–4.6)
LYMPHOCYTES NFR BLD AUTO: 21.8 %
M SPIKE % (OHS): ABNORMAL
M SPIKE (OHS): ABNORMAL
MCH RBC QN AUTO: 27.1 PG (ref 27–31)
MCHC RBC AUTO-ENTMCNC: 31.5 MG/DL (ref 33–36)
MCV RBC AUTO: 86.2 FL (ref 80–94)
MONOCYTES # BLD AUTO: 1.26 X10(3)/MCL (ref 0.1–1.3)
MONOCYTES NFR BLD AUTO: 10 %
NEUTROPHILS # BLD AUTO: 7.9 X10(3)/MCL (ref 2.1–9.2)
NEUTROPHILS NFR BLD AUTO: 62.4 %
NRBC BLD AUTO-RTO: 0 %
P-ANCA SER QL IF: NEGATIVE
PATH REV: NORMAL
PLATELET # BLD AUTO: 226 X10(3)/MCL (ref 130–400)
PMV BLD AUTO: 10.5 FL (ref 7.4–10.4)
POTASSIUM SERPL-SCNC: 4.5 MMOL/L (ref 3.5–5.1)
PROT SERPL-MCNC: 5.4 GM/DL (ref 6.4–8.3)
PROT SERPL-MCNC: 6.5 GM/DL (ref 6.4–8.3)
RBC # BLD AUTO: 3.98 X10(6)/MCL (ref 4.2–5.4)
SODIUM SERPL-SCNC: 139 MMOL/L (ref 136–145)
WBC # SPEC AUTO: 12.6 X10(3)/MCL (ref 4.5–11.5)

## 2022-07-22 PROCEDURE — 80100014 HC HEMODIALYSIS 1:1

## 2022-07-22 PROCEDURE — 36415 COLL VENOUS BLD VENIPUNCTURE: CPT | Performed by: INTERNAL MEDICINE

## 2022-07-22 PROCEDURE — 63600175 PHARM REV CODE 636 W HCPCS: Performed by: STUDENT IN AN ORGANIZED HEALTH CARE EDUCATION/TRAINING PROGRAM

## 2022-07-22 PROCEDURE — 36415 COLL VENOUS BLD VENIPUNCTURE: CPT | Performed by: STUDENT IN AN ORGANIZED HEALTH CARE EDUCATION/TRAINING PROGRAM

## 2022-07-22 PROCEDURE — 86039 ANTINUCLEAR ANTIBODIES (ANA): CPT | Performed by: INTERNAL MEDICINE

## 2022-07-22 PROCEDURE — 90935 HEMODIALYSIS ONE EVALUATION: CPT | Mod: ,,, | Performed by: NURSE PRACTITIONER

## 2022-07-22 PROCEDURE — 25000003 PHARM REV CODE 250: Performed by: STUDENT IN AN ORGANIZED HEALTH CARE EDUCATION/TRAINING PROGRAM

## 2022-07-22 PROCEDURE — 86148 ANTI-PHOSPHOLIPID ANTIBODY: CPT | Performed by: STUDENT IN AN ORGANIZED HEALTH CARE EDUCATION/TRAINING PROGRAM

## 2022-07-22 PROCEDURE — 94761 N-INVAS EAR/PLS OXIMETRY MLT: CPT

## 2022-07-22 PROCEDURE — 85025 COMPLETE CBC W/AUTO DIFF WBC: CPT | Performed by: STUDENT IN AN ORGANIZED HEALTH CARE EDUCATION/TRAINING PROGRAM

## 2022-07-22 PROCEDURE — 11000001 HC ACUTE MED/SURG PRIVATE ROOM

## 2022-07-22 PROCEDURE — 30000890 MAYO GENERIC ORDERABLE: Performed by: STUDENT IN AN ORGANIZED HEALTH CARE EDUCATION/TRAINING PROGRAM

## 2022-07-22 PROCEDURE — 86225 DNA ANTIBODY NATIVE: CPT | Performed by: STUDENT IN AN ORGANIZED HEALTH CARE EDUCATION/TRAINING PROGRAM

## 2022-07-22 PROCEDURE — 80053 COMPREHEN METABOLIC PANEL: CPT | Performed by: STUDENT IN AN ORGANIZED HEALTH CARE EDUCATION/TRAINING PROGRAM

## 2022-07-22 PROCEDURE — 86255 FLUORESCENT ANTIBODY SCREEN: CPT | Performed by: INTERNAL MEDICINE

## 2022-07-22 PROCEDURE — 87389 HIV-1 AG W/HIV-1&-2 AB AG IA: CPT | Performed by: STUDENT IN AN ORGANIZED HEALTH CARE EDUCATION/TRAINING PROGRAM

## 2022-07-22 PROCEDURE — 94760 N-INVAS EAR/PLS OXIMETRY 1: CPT

## 2022-07-22 PROCEDURE — 99900035 HC TECH TIME PER 15 MIN (STAT)

## 2022-07-22 PROCEDURE — 86235 NUCLEAR ANTIGEN ANTIBODY: CPT | Performed by: STUDENT IN AN ORGANIZED HEALTH CARE EDUCATION/TRAINING PROGRAM

## 2022-07-22 PROCEDURE — 90935 PR HEMODIALYSIS, ONE EVALUATION: ICD-10-PCS | Mod: ,,, | Performed by: NURSE PRACTITIONER

## 2022-07-22 PROCEDURE — 25000003 PHARM REV CODE 250: Performed by: INTERNAL MEDICINE

## 2022-07-22 PROCEDURE — S0030 INJECTION, METRONIDAZOLE: HCPCS | Performed by: STUDENT IN AN ORGANIZED HEALTH CARE EDUCATION/TRAINING PROGRAM

## 2022-07-22 RX ORDER — FLUTICASONE FUROATE AND VILANTEROL 100; 25 UG/1; UG/1
1 POWDER RESPIRATORY (INHALATION) DAILY
Status: DISCONTINUED | OUTPATIENT
Start: 2022-07-22 | End: 2022-07-28 | Stop reason: HOSPADM

## 2022-07-22 RX ORDER — PANTOPRAZOLE SODIUM 40 MG/1
40 TABLET, DELAYED RELEASE ORAL DAILY
Status: DISCONTINUED | OUTPATIENT
Start: 2022-07-22 | End: 2022-07-28 | Stop reason: HOSPADM

## 2022-07-22 RX ORDER — LIDOCAINE HYDROCHLORIDE 20 MG/ML
10 SOLUTION OROPHARYNGEAL ONCE
Status: COMPLETED | OUTPATIENT
Start: 2022-07-22 | End: 2022-07-22

## 2022-07-22 RX ORDER — ONDANSETRON 2 MG/ML
8 INJECTION INTRAMUSCULAR; INTRAVENOUS EVERY 8 HOURS PRN
Status: DISCONTINUED | OUTPATIENT
Start: 2022-07-22 | End: 2022-07-28 | Stop reason: HOSPADM

## 2022-07-22 RX ORDER — MAG HYDROX/ALUMINUM HYD/SIMETH 200-200-20
30 SUSPENSION, ORAL (FINAL DOSE FORM) ORAL ONCE
Status: COMPLETED | OUTPATIENT
Start: 2022-07-22 | End: 2022-07-23

## 2022-07-22 RX ORDER — LIDOCAINE HYDROCHLORIDE 20 MG/ML
10 SOLUTION OROPHARYNGEAL ONCE
Status: COMPLETED | OUTPATIENT
Start: 2022-07-22 | End: 2022-07-23

## 2022-07-22 RX ORDER — MAG HYDROX/ALUMINUM HYD/SIMETH 200-200-20
30 SUSPENSION, ORAL (FINAL DOSE FORM) ORAL ONCE
Status: COMPLETED | OUTPATIENT
Start: 2022-07-22 | End: 2022-07-22

## 2022-07-22 RX ADMIN — MUPIROCIN: 20 OINTMENT TOPICAL at 08:07

## 2022-07-22 RX ADMIN — METRONIDAZOLE 500 MG: 500 INJECTION, SOLUTION INTRAVENOUS at 07:07

## 2022-07-22 RX ADMIN — CEFEPIME 250 MG: 1 INJECTION, POWDER, FOR SOLUTION INTRAMUSCULAR; INTRAVENOUS at 04:07

## 2022-07-22 RX ADMIN — HEPARIN SODIUM 5000 UNITS: 5000 INJECTION, SOLUTION INTRAVENOUS; SUBCUTANEOUS at 08:07

## 2022-07-22 RX ADMIN — SODIUM CHLORIDE: 9 INJECTION, SOLUTION INTRAVENOUS at 05:07

## 2022-07-22 RX ADMIN — LIDOCAINE HYDROCHLORIDE 10 ML: 20 SOLUTION ORAL; TOPICAL at 04:07

## 2022-07-22 RX ADMIN — ONDANSETRON 8 MG: 2 INJECTION INTRAMUSCULAR; INTRAVENOUS at 08:07

## 2022-07-22 RX ADMIN — ALUMINUM HYDROXIDE, MAGNESIUM HYDROXIDE, AND SIMETHICONE 30 ML: 200; 200; 20 SUSPENSION ORAL at 04:07

## 2022-07-22 RX ADMIN — MUPIROCIN: 20 OINTMENT TOPICAL at 09:07

## 2022-07-22 NOTE — PROGRESS NOTES
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 Calderon. Presents to ED as a transfer from [place] for ARF. She reports she began experiencing SOB around 5AM. Denies any f/c, cough, orthopnea, leg swelling. She has some SOB at baseline, uses 3L home O2 but reports this was worse than usual. She has a hx of recurrent PNA requiring hospitalization. She also reports 2-3 week histroy of generalized fatigue, n/v, and diarrhea. Having around 2 episodes of emesis per day, associated w/ meals, difficulty tolerating PO intake, also having multiple episodes of loose watery stools daily. Denies any blood in the vomit/stool. Denies chest pain, abdominal pain, dysuria. Reports she is urinating less often and smaller volume. She has not seen Dr. Calderon since last December however she reports compliance w/ her Actemra and Ofev. No longer taking prednisone daily since that time. She denies any recent sick contacts. Last hospitalized 1 month ago w/ PNA.     At outside facility, work up showed elevated WBC 15, BUN/Crt 54/9, no electrolyte abnormalities. Troponin, BNP WNL. COVID negative. UA showed WBCs and bacteria. She was given 1L NS and Rocephin x1, trasnfered to [facility] for ARF. In ED, /75, afebrile, O2 100% on 3L. On my interview, she is alert and oriented x3, in no distress, somewhat lethargic. Answers all questions appropriately. Reports her SOB has since resolved and has no acute complaints. Repeat labs here show increase in WBC to 19, BUN/Crt 55/9, AST 71, . CXR shows increased interstitial markings but no obvious opacities. Renal US unremarkable. Medicine consulted for ARF.     Interval Hx 7/22/22: No acute events  overnight. She reports nausea this AM. Currently NPO. Patient received dialysis yesterday and tolerated it appropriately. 400 ml of fluid was removed. Today patient will receive another session of dialysis. She denies fever, chills, headache, SOB, chest pain, and abdominal pain.     No current facility-administered medications on file prior to encounter.     Current Outpatient Medications on File Prior to Encounter   Medication Sig    gabapentin (NEURONTIN) 300 MG capsule Take 300 mg by mouth 2 (two) times a day.    nintedanib (OFEV) 150 mg Cap Ofev 150 mg capsule    tocilizumab (ACTEMRA) 162 mg/0.9 mL injection Inject 162 mg into the skin once a week.    albuterol (PROVENTIL) 2.5 mg /3 mL (0.083 %) nebulizer solution Take 3 mLs by nebulization every 8 (eight) hours as needed.    albuterol (PROVENTIL/VENTOLIN HFA) 90 mcg/actuation inhaler Inhale 2 puffs into the lungs every 6 (six) hours.    amLODIPine (NORVASC) 10 MG tablet Take 10 mg by mouth once daily.    budesonide-formoterol 160-4.5 mcg (SYMBICORT) 160-4.5 mcg/actuation HFAA Inhale 1 puff into the lungs daily as needed.    diclofenac sodium (VOLTAREN) 1 % Gel Apply 1 Tube topically every 6 (six) hours as needed.    ergocalciferol (ERGOCALCIFEROL) 50,000 unit Cap Take 1 capsule by mouth Daily.    ezetimibe (ZETIA) 10 mg tablet Take 10 mg by mouth every evening.    furosemide (LASIX) 40 MG tablet Take 40 mg by mouth Daily.    loratadine (CLARITIN) 10 mg tablet Take 10 mg by mouth once daily at 6am.    meloxicam (MOBIC) 15 MG tablet Take 15 mg by mouth once daily.    metoprolol tartrate (LOPRESSOR) 100 MG tablet Take 100 mg by mouth once daily.    omeprazole (PRILOSEC) 20 MG capsule Take 20 mg by mouth once daily.    ondansetron (ZOFRAN-ODT) 4 MG TbDL Take 4 mg by mouth every 6 (six) hours as needed.    predniSONE (DELTASONE) 10 MG tablet Take 10 mg by mouth Daily.     Family History    None       Tobacco Use    Smoking status: Not on file     Smokeless tobacco: Not on file   Substance and Sexual Activity    Alcohol use: Not on file    Drug use: Not on file    Sexual activity: Not on file     ROS  See above       Objective:     Vital Signs (Most Recent):  Temp: 98.3 °F (36.8 °C) (07/22/22 0755)  Pulse: 77 (07/22/22 0755)  Resp: 18 (07/21/22 1902)  BP: 102/67 (07/22/22 0755)  SpO2: 96 % (07/22/22 0800) Vital Signs (24h Range):  Temp:  [98.1 °F (36.7 °C)-98.7 °F (37.1 °C)] 98.3 °F (36.8 °C)  Pulse:  [] 77  Resp:  [18-20] 18  SpO2:  [94 %-100 %] 96 %  BP: ()/(60-79) 102/67     Weight: 79.6 kg (175 lb 7.8 oz) (Simultaneous filing. User may not have seen previous data.)  Body mass index is 40.79 kg/m².    Physical Exam  Constitutional:       General: She is not in acute distress.     Appearance: She is obese. She is not toxic-appearing.   HENT:      Head: Normocephalic and atraumatic.      Nose: Nose normal. No congestion.      Mouth/Throat:      Pharynx: Oropharynx is clear. No oropharyngeal exudate.   Eyes:      Extraocular Movements: Extraocular movements intact.      Conjunctiva/sclera: Conjunctivae normal.      Pupils: Pupils are equal, round, and reactive to light.   Neck:      Vascular: No carotid bruit.   Cardiovascular:      Rate and Rhythm: Normal rate and regular rhythm.      Pulses: Normal pulses.      Heart sounds: No murmur heard.    No gallop.   Pulmonary:      Effort: Pulmonary effort is normal. No respiratory distress.      Breath sounds: Normal breath sounds.   Abdominal:      General: Abdomen is flat. Bowel sounds are normal. There is no distension.      Palpations: Abdomen is soft.      Tenderness: There is no abdominal tenderness. There is no right CVA tenderness, left CVA tenderness or guarding.   Musculoskeletal:         General: No deformity.      Right lower leg: No edema.      Left lower leg: No edema.   Skin:     General: Skin is cool.      Capillary Refill: Capillary refill takes 2 to 3 seconds.      Coloration: Skin  is not jaundiced.      Findings: No erythema or rash.   Neurological:      General: No focal deficit present.      Mental Status: She is alert and oriented to person, place, and time.      Sensory: No sensory deficit.      Motor: No weakness.       Assessment/Plan:   ARF   -On initial presentation BUN/Cr 55.3/9.34  -This AM BUN/Cr 10.1/4.29  -Differential includes AIN vs glomerulonephritis 2/2 to rheumatologic disease   -Continue IVF with 0.9% NaCl at 100 ml/hr  -Pending prt/crt, ANCA, SPEP   -C3 and C4 WNL, urine Na WNL  -May ultimately need high dose steroids  -Plan for kidney biopsy with IR on Tuesday, will need to hold morning Heparin that day   -Surgery consulted for dialysis catheter, catheter placed on 7/20. Received first dialysis session on 7/20, 0 ml removed. Received another of dialysis yesterday and 400 ml removed. Will receive another session of dialysis today  -Nephrology consulted, appreciate them on the case and following. Thank you for all the recommedations    Leukocytosis, down trending   UTI   Hx of recurrent PNAs   -Received Rocephin at outside facility   -WBC increased to 19, reports she has not been on PO steroids for months now  -On immuno modulators   -Lactic acid WNL    -Blood cultures showed NG at 24 hrs. UCx showed NG at 24 hrs   -Continue broad spectrum abx for now awaiting results    Antisythetase syndrome  -C3 and C4 WNL, c-ANCA and p-ANCA negative  -Patient is on board for sending referral to Rheumatology to anyone in [place] that will accept her insurance. CM helping to find accepting providers     Code: FULL code   DVT: Heparin   Lines: PIV   Abx: Cefepime, flagyl   Fluids: 0.9% NaCl at 100 ml/hr    Dispo: 46 y/o female admitted to Medicine for ARF. Hx of Antisythetase syndrome. Receiving another session of dialysis today. On clear liquid diet, advance as tolerated. Plan for kidney biopsy on Tuesday, will need to hold Heparin that AM.      Molly Rey MD  HO-3,  Department of Family Medicine

## 2022-07-22 NOTE — PLAN OF CARE
Problem: Adult Inpatient Plan of Care  Goal: Plan of Care Review  Outcome: Ongoing, Progressing  Goal: Patient-Specific Goal (Individualized)  Outcome: Ongoing, Progressing  Goal: Absence of Hospital-Acquired Illness or Injury  Outcome: Ongoing, Progressing  Goal: Optimal Comfort and Wellbeing  Outcome: Ongoing, Progressing  Goal: Readiness for Transition of Care  Outcome: Ongoing, Progressing     Problem: Gas Exchange Impaired  Goal: Optimal Gas Exchange  Outcome: Ongoing, Progressing     Problem: Fluid and Electrolyte Imbalance (Acute Kidney Injury/Impairment)  Goal: Fluid and Electrolyte Balance  Outcome: Ongoing, Progressing     Problem: Oral Intake Inadequate (Acute Kidney Injury/Impairment)  Goal: Optimal Nutrition Intake  Outcome: Ongoing, Progressing     Problem: Renal Function Impairment (Acute Kidney Injury/Impairment)  Goal: Effective Renal Function  Outcome: Ongoing, Progressing

## 2022-07-22 NOTE — NURSING
07/22/22 1100   Post-Hemodialysis Assessment   Blood Volume Processed (Liters) 61 L   Dialyzer Clearance Lightly streaked   Total UF (mL) 1000 mL   Patient Response to Treatment tolerated 3 hr treatment well

## 2022-07-22 NOTE — PLAN OF CARE
07/22/22 1507   Discharge Assessment   Assessment Type Discharge Planning Assessment   Confirmed/corrected address, phone number and insurance Yes   Confirmed Demographics Correct on Facesheet   Source of Information patient   When was your last doctors appointment?   (PCP: Chaitanya Cosme)   Communicated QUINN with patient/caregiver Date not available/Unable to determine   Reason For Admission SOB, renal failure   Lives With spouse;child(carole), dependent   Do you expect to return to your current living situation? Yes   Do you have help at home or someone to help you manage your care at home? Yes   Who are your caregiver(s) and their phone number(s)? Jovani Brady, , P: [phone]   Prior to hospitilization cognitive status: Alert/Oriented;No Deficits   Current cognitive status: Alert/Oriented;No Deficits   Walking or Climbing Stairs Difficulty none   Dressing/Bathing Difficulty none   Home Accessibility stairs to enter home   Number of Stairs, Main Entrance three   Stair Railings, Main Entrance railings safe and in good condition;railing on left side (ascending)   Home Layout Able to live on 1st floor   Equipment Currently Used at Home shower chair;oxygen  (Patient has O2 concentrator and portable from [employer])   Readmission within 30 days? No   Do you currently have service(s) that help you manage your care at home? No   Do you take prescription medications? Yes  ([facility] in [place])   Do you have prescription coverage? Yes   Coverage [facility] Dual Complete   Do you have any problems affording any of your prescribed medications? No   Is the patient taking medications as prescribed? yes   Who is going to help you get home at discharge? Jovani Brady, , P: [phone] or Ibis Amaya, sister, P: [phone]   How do you get to doctors appointments? family or friend will provide   Are you on dialysis? No   Do you take coumadin? No   Discharge Plan A Home with family   DME Needed Upon Discharge  none    Discharge Plan discussed with: Patient   Discharge Barriers Identified None   Relationship/Environment   Name(s) of Who Lives With Patient Jovani Brady, , P: [phone]     Consult for finding in-network rheumatologist noted. Patient was previously seeing Dr. Calderon in [place] who has moved out of town. She had an appointment with one in [place], but missed it and says that now they won't see her. Patient instructed to call the customer service number on the back of her insurance card so that she can be given a list of in-network providers.

## 2022-07-22 NOTE — PROGRESS NOTES
"Nephrology Progress Note  Chief Complaint   Patient presents with    Abdominal Pain     Transfer from [place] for [facility] Course: Ms. Brady is a 47-year-old  female with past medical history of antisythetase syndrome, inflammatory arthritis, Raynaud's, and interstitial lung disease.  Patient was transferred from an outside facility on 07/19/2022 for higher level of care.  Patient presented to the ED with complaints of shortness of breath, decreased appetite, nausea, vomiting, and diarrhea that lasted for approximately 2-3 weeks.  BUN and creatinine were significantly elevated from baseline.  Patient has not improved with conservative measures (IV hydration), was initiated on hemodialysis on 07/20/2022.  We are continuing to follow for nephrology care.     Subjective  Patient, continues to complain of weakness but denies shortness of breath.  Appetite is gradually improving.    Review of Systems   Respiratory: Negative for cough and shortness of breath.    Cardiovascular: Negative for chest pain.       Objective  /67   Pulse 77   Temp 98.3 °F (36.8 °C) (Oral)   Resp 18   Ht 4' 7" (1.397 m)   Wt 79.6 kg (175 lb 7.8 oz) Comment: Simultaneous filing. User may not have seen previous data.  SpO2 96%   Breastfeeding No   BMI 40.79 kg/m²     Intake/Output Summary (Last 24 hours) at 7/22/2022 0929  Last data filed at 7/22/2022 0600  Gross per 24 hour   Intake 700 ml   Output 900 ml   Net -200 ml       Physical Exam  General appearance: Patient is in no acute distress.  Skin: No rashes or wounds.  HEENT: PERRLA, EOMI, no scleral icterus, no JVD. Neck is supple.  Chest: Respirations are unlabored. Lungs sounds are clear.   Heart: S1, S2.   Abdomen: Benign.  : Deferred.  Extremities: No edema, peripheral pulses are palpable.   Neuro: No focal deficits.     Laboratory Data:   Recent Results (from the past 8 hour(s))   Comprehensive Metabolic Panel (CMP)    Collection Time: 07/22/22  " 4:38 AM   Result Value Ref Range    Sodium Level 139 136 - 145 mmol/L    Potassium Level 4.5 3.5 - 5.1 mmol/L    Chloride 114 (H) 98 - 107 mmol/L    Carbon Dioxide 17 (L) 22 - 29 mmol/L    Glucose Level 87 74 - 100 mg/dL    Blood Urea Nitrogen 10.1 7.0 - 18.7 mg/dL    Creatinine 4.29 (H) 0.55 - 1.02 mg/dL    Calcium Level Total 8.0 (L) 8.4 - 10.2 mg/dL    Protein Total 5.4 (L) 6.4 - 8.3 gm/dL    Albumin Level 2.5 (L) 3.5 - 5.0 gm/dL    Globulin 2.9 2.4 - 3.5 gm/dL    Albumin/Globulin Ratio 0.9 (L) 1.1 - 2.0 ratio    Bilirubin Total 0.3 <=1.5 mg/dL    Alkaline Phosphatase 84 40 - 150 unit/L    Alanine Aminotransferase 65 (H) 0 - 55 unit/L    Aspartate Aminotransferase 26 5 - 34 unit/L    Estimated GFR- 14 mls/min/1.73/m2   CBC with Differential    Collection Time: 07/22/22  4:38 AM   Result Value Ref Range    WBC 12.6 (H) 4.5 - 11.5 x10(3)/mcL    RBC 3.98 (L) 4.20 - 5.40 x10(6)/mcL    Hgb 10.8 (L) 12.0 - 16.0 gm/dL    Hct 34.3 (L) 37.0 - 47.0 %    MCV 86.2 80.0 - 94.0 fL    MCH 27.1 27.0 - 31.0 pg    MCHC 31.5 (L) 33.0 - 36.0 mg/dL    RDW 15.4 11.5 - 17.0 %    Platelet 226 130 - 400 x10(3)/mcL    MPV 10.5 (H) 7.4 - 10.4 fL    Neut % 62.4 %    Lymph % 21.8 %    Mono % 10.0 %    Eos % 4.7 %    Basophil % 0.5 %    Lymph # 2.75 0.6 - 4.6 x10(3)/mcL    Neut # 7.9 2.1 - 9.2 x10(3)/mcL    Mono # 1.26 0.1 - 1.3 x10(3)/mcL    Eos # 0.59 0 - 0.9 x10(3)/mcL    Baso # 0.06 0 - 0.2 x10(3)/mcL    IG# 0.07 (H) 0 - 0.04 x10(3)/mcL    IG% 0.6 %    NRBC% 0.0 %        Medications    Current Facility-Administered Medications:     0.9%  NaCl infusion, , Intravenous, Continuous, Evie Richter MD, Last Rate: 100 mL/hr at 07/22/22 0552, New Bag at 07/22/22 0552    acetaminophen tablet 1,000 mg, 1,000 mg, Oral, Q6H PRN, Molly Rey MD    albuterol-ipratropium 2.5 mg-0.5 mg/3 mL nebulizer solution 3 mL, 3 mL, Nebulization, Q6H PRN, Savage Kimble MD    ceFEPIme (MAXIPIME) 250 mg in sodium  chloride 0.9 % 50 mL IVPB, 250 mg, Intravenous, Q24H, Savage Kimble MD, Stopped at 07/22/22 0528    dextrose 10% bolus 125 mL, 12.5 g, Intravenous, PRN, Kashmir Box MD    dextrose 10% bolus 250 mL, 25 g, Intravenous, PRN, Kashmir Box MD    glucagon (human recombinant) injection 1 mg, 1 mg, Intramuscular, PRN, Savage Kimble MD    glucose chewable tablet 16 g, 16 g, Oral, PRN, Savage Kimble MD    glucose chewable tablet 24 g, 24 g, Oral, PRN, Savage Kimble MD    heparin (porcine) injection 5,000 Units, 5,000 Units, Subcutaneous, Q12H, Savage Kimble MD, 5,000 Units at 07/22/22 0839    hydrOXYzine pamoate capsule 25 mg, 25 mg, Oral, Nightly PRN, Hai Bills MD, 25 mg at 07/20/22 2057    melatonin tablet 6 mg, 6 mg, Oral, Nightly PRN, Hai Bills MD, 6 mg at 07/20/22 2056    metronidazole IVPB 500 mg, 500 mg, Intravenous, Q8H, Savage Kimble MD, Stopped at 07/22/22 0832    mupirocin 2 % ointment, , Nasal, BID, Kashmir Box MD, Given at 07/22/22 0900    naloxone 0.4 mg/mL injection 0.02 mg, 0.02 mg, Intravenous, PRN, Savage Kimble MD    ondansetron injection 8 mg, 8 mg, Intravenous, Q8H PRN, Molly Rey MD, 8 mg at 07/22/22 0848    sodium chloride 0.9% flush 10 mL, 10 mL, Intravenous, Q12H PRN, Savage Kimble MD     Imaging:   X-Ray Chest 1 View   Final Result      Central venous catheter terminates within the right atrium.         Electronically signed by: Red Kat   Date:    07/20/2022   Time:    14:40      X-Ray Knee 3 View Right   Final Result      Right knee degenerative osteoarthritic changes, unchanged.         Electronically signed by: Red Kat   Date:    07/20/2022   Time:    11:42      US Retroperitoneal Limited   Final Result      Normal sized, nonobstructed kidneys.         Electronically signed by: Prudence Chun   Date:    07/20/2022   Time:    10:34      X-Ray Chest 1 View    Final Result      No acute cardiopulmonary process.  Findings of chronic lung disease.         Electronically signed by: Gabriel Clarke   Date:    07/20/2022   Time:    06:32      US Previous   Final Result      Xray Previous   Final Result      IR Biopsy Kidney    (Results Pending)       Impression:   Acute kidney injury  Metabolic acidosis  Anemia of chronic disease  Leukocytosis  History of antisynthetase syndrome  Inflammatory arthritis  Interstitial lung disease     Plan:   Will dialyze patient again today for uremic toxin clearance and correction of metabolic acidosis.  She is scheduled for kidney biopsy today, preliminary results should be available on Monday.  Will continue IV fluids at current rate, hold hemodialysis over the weekend to assess for possible functional renal recovery.

## 2022-07-22 NOTE — PROGRESS NOTES
07/22/22 0927   Missed Time Reason   Missed Treatment Reason Dialysis  (Pt currently undergoing dialysis and per nurse pt is scheduled for kidney biopsy afterward; hold OT for today, f/u as schedule allows)

## 2022-07-22 NOTE — TELEPHONE ENCOUNTER
Dr. Clarke spoke to Dr. Richter and states IR will do Kidney bx Tuesday Morning possible as early at 7:30 A.M

## 2022-07-23 LAB
ALBUMIN SERPL-MCNC: 2.5 GM/DL (ref 3.5–5)
ALBUMIN/GLOB SERPL: 0.9 RATIO (ref 1.1–2)
ALP SERPL-CCNC: 77 UNIT/L (ref 40–150)
ALT SERPL-CCNC: 49 UNIT/L (ref 0–55)
AST SERPL-CCNC: 28 UNIT/L (ref 5–34)
BASOPHILS # BLD AUTO: 0.05 X10(3)/MCL (ref 0–0.2)
BASOPHILS NFR BLD AUTO: 0.5 %
BILIRUBIN DIRECT+TOT PNL SERPL-MCNC: 0.3 MG/DL
BUN SERPL-MCNC: 5.8 MG/DL (ref 7–18.7)
CALCIUM SERPL-MCNC: 7.8 MG/DL (ref 8.4–10.2)
CHLORIDE SERPL-SCNC: 111 MMOL/L (ref 98–107)
CO2 SERPL-SCNC: 23 MMOL/L (ref 22–29)
CREAT SERPL-MCNC: 3.04 MG/DL (ref 0.55–1.02)
DEPRECATED CALCIDIOL+CALCIFEROL SERPL-MC: 8.6 NG/ML (ref 30–80)
EOSINOPHIL # BLD AUTO: 0.55 X10(3)/MCL (ref 0–0.9)
EOSINOPHIL NFR BLD AUTO: 5.4 %
ERYTHROCYTE [DISTWIDTH] IN BLOOD BY AUTOMATED COUNT: 15.5 % (ref 11.5–17)
FERRITIN SERPL-MCNC: 381.11 NG/ML (ref 4.63–204)
FOLATE SERPL-MCNC: 3.2 NG/ML (ref 7–31.4)
GLOBULIN SER-MCNC: 2.8 GM/DL (ref 2.4–3.5)
GLUCOSE SERPL-MCNC: 82 MG/DL (ref 74–100)
HCT VFR BLD AUTO: 33.7 % (ref 37–47)
HGB BLD-MCNC: 10.6 GM/DL (ref 12–16)
IMM GRANULOCYTES # BLD AUTO: 0.06 X10(3)/MCL (ref 0–0.04)
IMM GRANULOCYTES NFR BLD AUTO: 0.6 %
IRON SATN MFR SERPL: 47 % (ref 20–50)
IRON SERPL-MCNC: 84 UG/DL (ref 50–170)
LYMPHOCYTES # BLD AUTO: 3.02 X10(3)/MCL (ref 0.6–4.6)
LYMPHOCYTES NFR BLD AUTO: 29.8 %
MCH RBC QN AUTO: 26.6 PG (ref 27–31)
MCHC RBC AUTO-ENTMCNC: 31.5 MG/DL (ref 33–36)
MCV RBC AUTO: 84.5 FL (ref 80–94)
MONOCYTES # BLD AUTO: 1.39 X10(3)/MCL (ref 0.1–1.3)
MONOCYTES NFR BLD AUTO: 13.7 %
NEUTROPHILS # BLD AUTO: 5.1 X10(3)/MCL (ref 2.1–9.2)
NEUTROPHILS NFR BLD AUTO: 50 %
NRBC BLD AUTO-RTO: 0 %
PHOSPHATE SERPL-MCNC: 2 MG/DL (ref 2.3–4.7)
PLATELET # BLD AUTO: 177 X10(3)/MCL (ref 130–400)
PMV BLD AUTO: 10 FL (ref 7.4–10.4)
POTASSIUM SERPL-SCNC: 4.2 MMOL/L (ref 3.5–5.1)
PROT SERPL-MCNC: 5.3 GM/DL (ref 6.4–8.3)
PTH-INTACT SERPL-MCNC: 260.2 PG/ML (ref 8.7–77)
RBC # BLD AUTO: 3.99 X10(6)/MCL (ref 4.2–5.4)
SODIUM SERPL-SCNC: 141 MMOL/L (ref 136–145)
TIBC SERPL-MCNC: 177 UG/DL (ref 250–450)
TIBC SERPL-MCNC: 93 UG/DL (ref 70–310)
TRANSFERRIN SERPL-MCNC: 150 MG/DL (ref 180–382)
VIT B12 SERPL-MCNC: 518 PG/ML (ref 213–816)
WBC # SPEC AUTO: 10.1 X10(3)/MCL (ref 4.5–11.5)

## 2022-07-23 PROCEDURE — 83970 ASSAY OF PARATHORMONE: CPT | Performed by: NURSE PRACTITIONER

## 2022-07-23 PROCEDURE — 83540 ASSAY OF IRON: CPT | Performed by: NURSE PRACTITIONER

## 2022-07-23 PROCEDURE — 99900035 HC TECH TIME PER 15 MIN (STAT)

## 2022-07-23 PROCEDURE — 82607 VITAMIN B-12: CPT | Performed by: NURSE PRACTITIONER

## 2022-07-23 PROCEDURE — 85025 COMPLETE CBC W/AUTO DIFF WBC: CPT | Performed by: NURSE PRACTITIONER

## 2022-07-23 PROCEDURE — 63600175 PHARM REV CODE 636 W HCPCS: Performed by: STUDENT IN AN ORGANIZED HEALTH CARE EDUCATION/TRAINING PROGRAM

## 2022-07-23 PROCEDURE — 94761 N-INVAS EAR/PLS OXIMETRY MLT: CPT

## 2022-07-23 PROCEDURE — 36415 COLL VENOUS BLD VENIPUNCTURE: CPT | Performed by: NURSE PRACTITIONER

## 2022-07-23 PROCEDURE — 82746 ASSAY OF FOLIC ACID SERUM: CPT | Performed by: NURSE PRACTITIONER

## 2022-07-23 PROCEDURE — 11000001 HC ACUTE MED/SURG PRIVATE ROOM

## 2022-07-23 PROCEDURE — 63600175 PHARM REV CODE 636 W HCPCS: Performed by: INTERNAL MEDICINE

## 2022-07-23 PROCEDURE — 25000242 PHARM REV CODE 250 ALT 637 W/ HCPCS: Performed by: STUDENT IN AN ORGANIZED HEALTH CARE EDUCATION/TRAINING PROGRAM

## 2022-07-23 PROCEDURE — 86431 RHEUMATOID FACTOR QUANT: CPT | Performed by: STUDENT IN AN ORGANIZED HEALTH CARE EDUCATION/TRAINING PROGRAM

## 2022-07-23 PROCEDURE — 25000003 PHARM REV CODE 250: Performed by: STUDENT IN AN ORGANIZED HEALTH CARE EDUCATION/TRAINING PROGRAM

## 2022-07-23 PROCEDURE — 25000003 PHARM REV CODE 250

## 2022-07-23 PROCEDURE — 82306 VITAMIN D 25 HYDROXY: CPT | Performed by: NURSE PRACTITIONER

## 2022-07-23 PROCEDURE — 86200 CCP ANTIBODY: CPT | Performed by: STUDENT IN AN ORGANIZED HEALTH CARE EDUCATION/TRAINING PROGRAM

## 2022-07-23 PROCEDURE — 80053 COMPREHEN METABOLIC PANEL: CPT | Performed by: NURSE PRACTITIONER

## 2022-07-23 PROCEDURE — 94640 AIRWAY INHALATION TREATMENT: CPT

## 2022-07-23 PROCEDURE — 84100 ASSAY OF PHOSPHORUS: CPT | Performed by: NURSE PRACTITIONER

## 2022-07-23 PROCEDURE — 36415 COLL VENOUS BLD VENIPUNCTURE: CPT | Performed by: STUDENT IN AN ORGANIZED HEALTH CARE EDUCATION/TRAINING PROGRAM

## 2022-07-23 PROCEDURE — 94760 N-INVAS EAR/PLS OXIMETRY 1: CPT

## 2022-07-23 PROCEDURE — 82728 ASSAY OF FERRITIN: CPT | Performed by: NURSE PRACTITIONER

## 2022-07-23 RX ORDER — PREDNISONE 10 MG/1
10 TABLET ORAL DAILY
Status: DISCONTINUED | OUTPATIENT
Start: 2022-07-23 | End: 2022-07-28 | Stop reason: HOSPADM

## 2022-07-23 RX ORDER — SODIUM CHLORIDE, SODIUM LACTATE, POTASSIUM CHLORIDE, CALCIUM CHLORIDE 600; 310; 30; 20 MG/100ML; MG/100ML; MG/100ML; MG/100ML
INJECTION, SOLUTION INTRAVENOUS CONTINUOUS
Status: DISCONTINUED | OUTPATIENT
Start: 2022-07-23 | End: 2022-07-26

## 2022-07-23 RX ORDER — FOLIC ACID 1 MG/1
1 TABLET ORAL DAILY
Status: DISCONTINUED | OUTPATIENT
Start: 2022-07-23 | End: 2022-07-28 | Stop reason: HOSPADM

## 2022-07-23 RX ORDER — DICYCLOMINE HYDROCHLORIDE 10 MG/1
10 CAPSULE ORAL 2 TIMES DAILY
Status: DISCONTINUED | OUTPATIENT
Start: 2022-07-23 | End: 2022-07-28 | Stop reason: HOSPADM

## 2022-07-23 RX ADMIN — FOLIC ACID 1 MG: 1 TABLET ORAL at 10:07

## 2022-07-23 RX ADMIN — ALUMINUM HYDROXIDE, MAGNESIUM HYDROXIDE, AND SIMETHICONE 30 ML: 200; 200; 20 SUSPENSION ORAL at 12:07

## 2022-07-23 RX ADMIN — PREDNISONE 10 MG: 10 TABLET ORAL at 08:07

## 2022-07-23 RX ADMIN — DICYCLOMINE HYDROCHLORIDE 10 MG: 10 CAPSULE ORAL at 09:07

## 2022-07-23 RX ADMIN — HEPARIN SODIUM 5000 UNITS: 5000 INJECTION, SOLUTION INTRAVENOUS; SUBCUTANEOUS at 08:07

## 2022-07-23 RX ADMIN — PANTOPRAZOLE SODIUM 40 MG: 40 TABLET, DELAYED RELEASE ORAL at 08:07

## 2022-07-23 RX ADMIN — SODIUM CHLORIDE, POTASSIUM CHLORIDE, SODIUM LACTATE AND CALCIUM CHLORIDE: 600; 310; 30; 20 INJECTION, SOLUTION INTRAVENOUS at 08:07

## 2022-07-23 RX ADMIN — MUPIROCIN: 20 OINTMENT TOPICAL at 08:07

## 2022-07-23 RX ADMIN — HEPARIN SODIUM 5000 UNITS: 5000 INJECTION, SOLUTION INTRAVENOUS; SUBCUTANEOUS at 09:07

## 2022-07-23 RX ADMIN — LIDOCAINE HYDROCHLORIDE 10 ML: 20 SOLUTION ORAL; TOPICAL at 12:07

## 2022-07-23 RX ADMIN — HYDROXYZINE PAMOATE 25 MG: 25 CAPSULE ORAL at 09:07

## 2022-07-23 RX ADMIN — DICYCLOMINE HYDROCHLORIDE 10 MG: 10 CAPSULE ORAL at 08:07

## 2022-07-23 RX ADMIN — FLUTICASONE FUROATE AND VILANTEROL TRIFENATATE 1 PUFF: 100; 25 POWDER RESPIRATORY (INHALATION) at 08:07

## 2022-07-23 NOTE — PROGRESS NOTES
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 Calderon. Presents to ED as a transfer from [place] for ARF. She reports she began experiencing SOB around 5AM. Denies any f/c, cough, orthopnea, leg swelling. She has some SOB at baseline, uses 3L home O2 but reports this was worse than usual. She has a hx of recurrent PNA requiring hospitalization. She also reports 2-3 week histroy of generalized fatigue, n/v, and diarrhea. Having around 2 episodes of emesis per day, associated w/ meals, difficulty tolerating PO intake, also having multiple episodes of loose watery stools daily. Denies any blood in the vomit/stool. Denies chest pain, abdominal pain, dysuria. Reports she is urinating less often and smaller volume. She has not seen Dr. Calderon since last December however she reports compliance w/ her Actemra and Ofev. No longer taking prednisone daily since that time. She denies any recent sick contacts. Last hospitalized 1 month ago w/ PNA.     At outside facility, work up showed elevated WBC 15, BUN/Crt 54/9, no electrolyte abnormalities. Troponin, BNP WNL. COVID negative. UA showed WBCs and bacteria. She was given 1L NS and Rocephin x1, trasnfered to [facility] for ARF. In ED, /75, afebrile, O2 100% on 3L. On my interview, she is alert and oriented x3, in no distress, somewhat lethargic. Answers all questions appropriately. Reports her SOB has since resolved and has no acute complaints. Repeat labs here show increase in WBC to 19, BUN/Crt 55/9, AST 71, . CXR shows increased interstitial markings but no obvious opacities. Renal US unremarkable. Medicine consulted for ARF.     Interval Hx 7/23/22: No acute events  overnight. She reports some abdominal cramping this AM. Currently on clear liquid diet, will advance as tolerated. Patient received dialysis yesterday and tolerated it appropriately, 1000 ml of fluid was removed. She denies fever, chills, headache, SOB, chest pain, and abdominal pain.     No current facility-administered medications on file prior to encounter.     Current Outpatient Medications on File Prior to Encounter   Medication Sig    gabapentin (NEURONTIN) 300 MG capsule Take 300 mg by mouth 2 (two) times a day.    nintedanib (OFEV) 150 mg Cap Ofev 150 mg capsule    tocilizumab (ACTEMRA) 162 mg/0.9 mL injection Inject 162 mg into the skin once a week.    albuterol (PROVENTIL) 2.5 mg /3 mL (0.083 %) nebulizer solution Take 3 mLs by nebulization every 8 (eight) hours as needed.    albuterol (PROVENTIL/VENTOLIN HFA) 90 mcg/actuation inhaler Inhale 2 puffs into the lungs every 6 (six) hours.    amLODIPine (NORVASC) 10 MG tablet Take 10 mg by mouth once daily.    budesonide-formoterol 160-4.5 mcg (SYMBICORT) 160-4.5 mcg/actuation HFAA Inhale 1 puff into the lungs daily as needed.    diclofenac sodium (VOLTAREN) 1 % Gel Apply 1 Tube topically every 6 (six) hours as needed.    ergocalciferol (ERGOCALCIFEROL) 50,000 unit Cap Take 1 capsule by mouth Daily.    ezetimibe (ZETIA) 10 mg tablet Take 10 mg by mouth every evening.    furosemide (LASIX) 40 MG tablet Take 40 mg by mouth Daily.    loratadine (CLARITIN) 10 mg tablet Take 10 mg by mouth once daily at 6am.    meloxicam (MOBIC) 15 MG tablet Take 15 mg by mouth once daily.    metoprolol tartrate (LOPRESSOR) 100 MG tablet Take 100 mg by mouth once daily.    omeprazole (PRILOSEC) 20 MG capsule Take 20 mg by mouth once daily.    ondansetron (ZOFRAN-ODT) 4 MG TbDL Take 4 mg by mouth every 6 (six) hours as needed.    predniSONE (DELTASONE) 10 MG tablet Take 10 mg by mouth Daily.     Family History    None       Tobacco Use    Smoking status: Not on file     Smokeless tobacco: Not on file   Substance and Sexual Activity    Alcohol use: Not on file    Drug use: Not on file    Sexual activity: Not on file     ROS  See above       Objective:     Vital Signs (Most Recent):  Temp: 98 °F (36.7 °C) (07/23/22 0436)  Pulse: 106 (07/23/22 0436)  Resp: 18 (07/21/22 1902)  BP: 110/76 (07/23/22 0436)  SpO2: 100 % (07/23/22 0436) Vital Signs (24h Range):  Temp:  [98 °F (36.7 °C)-99.5 °F (37.5 °C)] 98 °F (36.7 °C)  Pulse:  [] 106  SpO2:  [94 %-100 %] 100 %  BP: (102-120)/(67-82) 110/76     Weight: 79.6 kg (175 lb 7.8 oz) (Simultaneous filing. User may not have seen previous data.)  Body mass index is 40.79 kg/m².    Physical Exam  Constitutional:       General: She is not in acute distress.     Appearance: She is obese. She is not toxic-appearing.   HENT:      Head: Normocephalic and atraumatic.      Nose: Nose normal. No congestion.      Mouth/Throat:      Pharynx: Oropharynx is clear. No oropharyngeal exudate.   Eyes:      Extraocular Movements: Extraocular movements intact.      Conjunctiva/sclera: Conjunctivae normal.      Pupils: Pupils are equal, round, and reactive to light.   Neck:      Vascular: No carotid bruit.   Cardiovascular:      Rate and Rhythm: Normal rate and regular rhythm.      Pulses: Normal pulses.      Heart sounds: No murmur heard.    No gallop.   Pulmonary:      Effort: Pulmonary effort is normal. No respiratory distress.      Breath sounds: Normal breath sounds.   Abdominal:      General: Abdomen is flat. Bowel sounds are normal. There is no distension.      Palpations: Abdomen is soft.      Tenderness: There is no abdominal tenderness. There is no right CVA tenderness, left CVA tenderness or guarding.   Musculoskeletal:         General: No deformity.      Right lower leg: No edema.      Left lower leg: No edema.   Skin:     General: Skin is cool.      Capillary Refill: Capillary refill takes 2 to 3 seconds.      Coloration: Skin is not  jaundiced.      Findings: No erythema or rash.   Neurological:      General: No focal deficit present.      Mental Status: She is alert and oriented to person, place, and time.      Sensory: No sensory deficit.      Motor: No weakness.       Assessment/Plan:   ARF, improving  -This AM BUN/Cr 5.8/3.04  -On initial presentation BUN/Cr 55.3/9.34  -Differential includes AIN vs glomerulonephritis 2/2 to rheumatologic disease   -Continue IVF with 0.9% NaCl at 100 ml/hr  -Pending prt/crt, INES, SPEP   -C3 and C4 WNL, p-ANCA, c-ANCA WNL, urine Na WNL  -Plan for kidney biopsy with IR on Tuesday, will need to hold morning Heparin Monday night and Tuesday AM  -Surgery consulted for dialysis catheter, catheter placed on 7/20. Received first dialysis session on 7/20, 0 ml removed. Received another of dialysis on 7/21 and 400 ml removed, and third session on 7/22 and 1L was removed  -Nephrology consulted, appreciate them on the case and following. Thank you for all the recommendations    Leukocytosis, down trending   UTI   Hx of recurrent PNAs   -Received Rocephin at outside facility   -WBC increased to 19, reports she has not been on PO steroids for months now  -Restarted home Prednisone 10 mg  -On immuno modulators, holding at this time    -Lactic acid WNL    -Blood cultures showed NG at 48 hrs. UCx showed NG    Folate deficiency  -Folate 3.2  -Started folic acid     Antisythetase syndrome  -C3 and C4 WNL, c-ANCA and p-ANCA negative  -Patient is on board for sending referral to Rheumatology to anyone in [place] that will accept her insurance. CM helping to find accepting providers     Code: FULL code   DVT: Heparin   Lines: PIV   Abx: Rocephin   Fluids: 0.9% NaCl at 100 ml/hr    Dispo: 46 y/o female admitted to Medicine for ARF. Hx of Antisythetase syndrome. Receiving third session of dialysis yesterday. On clear liquid diet, advance as tolerated. Plan for kidney biopsy on Tuesday, will need to hold Heparin Monday night and  Tuesday AM.      Molly Rey MD  [facility], Department of Family Medicine        No significant past surgical history

## 2022-07-23 NOTE — PROGRESS NOTES
"Nephrology Progress Note    Hospital course:   47-year-old  female presented to the ER complaining of nausea vomiting diarrhea.  Creatinine 0.7 approximately 6-7 months ago.  Renal service consult for evaluation management of acute renal failure with creatinine of 8.1.  Patient required dialysis for clearance purposes as she did not respond to IV fluid resuscitation.  Patient was scheduled for kidney biopsy yesterday  received heparin and therefore Interventional Radiology could not proceed with procedure.  She is scheduled for kidney biopsy on Tuesday per primary team.  Dr. Clarke the interventional radiologist to contact me concerning procedure yesterday.    Subjective:   This morning, patient relates that she does feel better appetite is improving..    Objective:   /69   Pulse 80   Temp 97.5 °F (36.4 °C) (Oral)   Resp 17   Ht 4' 7" (1.397 m)   Wt 79.6 kg (175 lb 7.8 oz) Comment: Simultaneous filing. User may not have seen previous data.  SpO2 96%   Breastfeeding No   BMI 40.79 kg/m²     Intake/Output Summary (Last 24 hours) at 7/23/2022 0818  Last data filed at 7/22/2022 1100  Gross per 24 hour   Intake --   Output 1000 ml   Net -1000 ml        Physical exam:   General:  Patient is alert and oriented person place time no acute distress  HEENT normocephalic atraumatic pupils equal round reactive to light  Neck:  No JVD bruit thyromegaly  Lungs:  Clear to auscultation bilaterally  Cardiovascular:  Regular rate and rhythm no murmur rub gallop appreciated  Abdomen:  Positive bowel sounds all 4 quadrants soft nontender nondistended  Extremities:  No clubbing cyanosis or edema noted  Neurologic:  Cranial nerves 2-12 grossly intact, no clonus asterixis appreciated    Laboratory data:   Recent Results (from the past 24 hour(s))   HIV 1/2 Ag/Ab (4th Gen)    Collection Time: 07/22/22  2:55 PM   Result Value Ref Range    HIV Nonreactive Nonreactive   Comprehensive Metabolic Panel    Collection " Time: 07/23/22  7:05 AM   Result Value Ref Range    Sodium Level 141 136 - 145 mmol/L    Potassium Level 4.2 3.5 - 5.1 mmol/L    Chloride 111 (H) 98 - 107 mmol/L    Carbon Dioxide 23 22 - 29 mmol/L    Glucose Level 82 74 - 100 mg/dL    Blood Urea Nitrogen 5.8 (L) 7.0 - 18.7 mg/dL    Creatinine 3.04 (H) 0.55 - 1.02 mg/dL    Calcium Level Total 7.8 (L) 8.4 - 10.2 mg/dL    Protein Total 5.3 (L) 6.4 - 8.3 gm/dL    Albumin Level 2.5 (L) 3.5 - 5.0 gm/dL    Globulin 2.8 2.4 - 3.5 gm/dL    Albumin/Globulin Ratio 0.9 (L) 1.1 - 2.0 ratio    Bilirubin Total 0.3 <=1.5 mg/dL    Alkaline Phosphatase 77 40 - 150 unit/L    Alanine Aminotransferase 49 0 - 55 unit/L    Aspartate Aminotransferase 28 5 - 34 unit/L    Estimated GFR- 21 mls/min/1.73/m2   Phosphorus    Collection Time: 07/23/22  7:05 AM   Result Value Ref Range    Phosphorus Level 2.0 (L) 2.3 - 4.7 mg/dL   PTH, Intact    Collection Time: 07/23/22  7:05 AM   Result Value Ref Range    Parathyroid Hormone Intact 260.2 (H) 8.7 - 77.0 pg/mL   Iron and TIBC    Collection Time: 07/23/22  7:05 AM   Result Value Ref Range    Iron Binding Capacity Unsaturated 93 70 - 310 ug/dL    Iron Level 84 50 - 170 ug/dL    Transferrin 150 (L) 180 - 382 mg/dL    Iron Binding Capacity Total 177 (L) 250 - 450 ug/dL    Iron Saturation 47 20 - 50 %   CBC with Differential    Collection Time: 07/23/22  7:05 AM   Result Value Ref Range    WBC 10.1 4.5 - 11.5 x10(3)/mcL    RBC 3.99 (L) 4.20 - 5.40 x10(6)/mcL    Hgb 10.6 (L) 12.0 - 16.0 gm/dL    Hct 33.7 (L) 37.0 - 47.0 %    MCV 84.5 80.0 - 94.0 fL    MCH 26.6 (L) 27.0 - 31.0 pg    MCHC 31.5 (L) 33.0 - 36.0 mg/dL    RDW 15.5 11.5 - 17.0 %    Platelet 177 130 - 400 x10(3)/mcL    MPV 10.0 7.4 - 10.4 fL    Neut % 50.0 %    Lymph % 29.8 %    Mono % 13.7 %    Eos % 5.4 %    Basophil % 0.5 %    Lymph # 3.02 0.6 - 4.6 x10(3)/mcL    Neut # 5.1 2.1 - 9.2 x10(3)/mcL    Mono # 1.39 (H) 0.1 - 1.3 x10(3)/mcL    Eos # 0.55 0 - 0.9 x10(3)/mcL    Baso  # 0.05 0 - 0.2 x10(3)/mcL    IG# 0.06 (H) 0 - 0.04 x10(3)/mcL    IG% 0.6 %    NRBC% 0.0 %       Imaging:   Imaging Results          X-Ray Chest 1 View (Final result)  Result time 07/20/22 06:32:04   Procedure changed from X-Ray Chest PA And Lateral     Final result by Gabriel Clarke MD (07/20/22 06:32:04)                 Impression:      No acute cardiopulmonary process.  Findings of chronic lung disease.      Electronically signed by: Gabriel Clarke  Date:    07/20/2022  Time:    06:32             Narrative:    EXAMINATION:  XR CHEST 1 VIEW    CLINICAL HISTORY:  Shortness of breath;SOB;    TECHNIQUE:  Single view of the chest    COMPARISON:  07/19/2022    FINDINGS:  Prominent interstitial markings with no focal opacification.    The cardiomediastinal silhouette is within normal limits.    No acute osseous abnormality.                                 Medications:     Current Facility-Administered Medications:     0.9%  NaCl infusion, , Intravenous, Continuous, Evie Richter MD, Last Rate: 100 mL/hr at 07/22/22 0552, New Bag at 07/22/22 0552    acetaminophen tablet 1,000 mg, 1,000 mg, Oral, Q6H PRN, Molly Rey MD    albuterol-ipratropium 2.5 mg-0.5 mg/3 mL nebulizer solution 3 mL, 3 mL, Nebulization, Q6H PRN, Savage Kimble MD    dextrose 10% bolus 125 mL, 12.5 g, Intravenous, PRN, Kashmir Box MD    dextrose 10% bolus 250 mL, 25 g, Intravenous, PRN, Kashmir Box MD    fluticasone furoate-vilanteroL 100-25 mcg/dose diskus inhaler 1 puff, 1 puff, Inhalation, Daily, 1 puff at 07/23/22 0816 **AND** MDI Daily, , , Daily, Molly Rey MD    glucagon (human recombinant) injection 1 mg, 1 mg, Intramuscular, PRN, Savage Kimble MD    glucose chewable tablet 16 g, 16 g, Oral, PRN, Savage Kimble MD    glucose chewable tablet 24 g, 24 g, Oral, PRN, Savage Kimble MD    heparin (porcine) injection 5,000 Units, 5,000 Units,  Subcutaneous, Q12H, Savage Kimble MD, 5,000 Units at 07/22/22 2030    hydrOXYzine pamoate capsule 25 mg, 25 mg, Oral, Nightly PRN, Hai Bills MD, 25 mg at 07/20/22 2057    melatonin tablet 6 mg, 6 mg, Oral, Nightly PRN, Hai Bills MD, 6 mg at 07/20/22 2056    mupirocin 2 % ointment, , Nasal, BID, Kashmir Box MD, Given at 07/22/22 2030    naloxone 0.4 mg/mL injection 0.02 mg, 0.02 mg, Intravenous, PRN, Savage Kimble MD    ondansetron injection 8 mg, 8 mg, Intravenous, Q8H PRN, Molly Rey MD, 8 mg at 07/22/22 0848    pantoprazole EC tablet 40 mg, 40 mg, Oral, Daily, Molly Rey MD    predniSONE tablet 10 mg, 10 mg, Oral, Daily, Molly Rey MD    sodium chloride 0.9% flush 10 mL, 10 mL, Intravenous, Q12H PRN, Savage Kimble MD     Impression/Plan:    1. Acute renal failure:  Unclear whether oliguric versus nonoliguric as this has not been accurately recorded imaging did not reveal any hydronephrosis.  The etiology of acute renal failure could be ischemic acute tubular necrosis due to hypotension and hypoperfusion as patient did have low blood pressure for 48 hours.  However, it is difficult to say at this point what caused the acute renal failure.  Patient did have uremic symptoms requiring hemodialysis.  Patient will have kidney biopsy on Tuesday.  This is necessary to discern the etiology of DUYEN.  Will hold dialysis today.  Hydrate for 24 hours and will recheck CMP in a.m. to monitor for any signs of meaningful renal function recovery.  I do recommend accurate intake and output.  Very bland urine sediment with proteinuria.  Anca and complement levels negative INES pending    2. Metabolic acidosis:  Mild.  Will change IV fluids to lactated Ringer's at 100 mL/hr.  This does contain buffer.  Will recheck laboratory studies in a.m..  We will reassess acid-base status.    3. Hypotension:  Stabilized and improved.  Monitor closely.      4.  Proteinuria:  SPEP is pending.    Evie Richter M.D.  Nephrology

## 2022-07-23 NOTE — PLAN OF CARE
Problem: Adult Inpatient Plan of Care  Goal: Plan of Care Review  Outcome: Ongoing, Progressing  Goal: Patient-Specific Goal (Individualized)  Outcome: Ongoing, Progressing  Goal: Absence of Hospital-Acquired Illness or Injury  Outcome: Ongoing, Progressing  Goal: Optimal Comfort and Wellbeing  Outcome: Ongoing, Progressing  Goal: Readiness for Transition of Care  Outcome: Ongoing, Progressing     Problem: Bariatric Environmental Safety  Goal: Safety Maintained with Care  Outcome: Ongoing, Progressing     Problem: Gas Exchange Impaired  Goal: Optimal Gas Exchange  Outcome: Ongoing, Progressing     Problem: Fluid and Electrolyte Imbalance (Acute Kidney Injury/Impairment)  Goal: Fluid and Electrolyte Balance  Outcome: Ongoing, Progressing     Problem: Oral Intake Inadequate (Acute Kidney Injury/Impairment)  Goal: Optimal Nutrition Intake  Outcome: Ongoing, Progressing     Problem: Renal Function Impairment (Acute Kidney Injury/Impairment)  Goal: Effective Renal Function  Outcome: Ongoing, Progressing     Problem: Skin Injury Risk Increased  Goal: Skin Health and Integrity  Outcome: Ongoing, Progressing     Problem: Infection  Goal: Absence of Infection Signs and Symptoms  Outcome: Ongoing, Progressing

## 2022-07-24 LAB
ALBUMIN SERPL-MCNC: 2.8 GM/DL (ref 3.5–5)
ALBUMIN/GLOB SERPL: 0.9 RATIO (ref 1.1–2)
ALP SERPL-CCNC: 77 UNIT/L (ref 40–150)
ALT SERPL-CCNC: 49 UNIT/L (ref 0–55)
APPEARANCE UR: CLEAR
AST SERPL-CCNC: 41 UNIT/L (ref 5–34)
BACTERIA #/AREA URNS AUTO: ABNORMAL /HPF
BASOPHILS # BLD AUTO: 0.05 X10(3)/MCL (ref 0–0.2)
BASOPHILS NFR BLD AUTO: 0.4 %
BILIRUB UR QL STRIP.AUTO: NEGATIVE MG/DL
BILIRUBIN DIRECT+TOT PNL SERPL-MCNC: 0.3 MG/DL
BUN SERPL-MCNC: 7.2 MG/DL (ref 7–18.7)
CALCIUM SERPL-MCNC: 8.2 MG/DL (ref 8.4–10.2)
CASTS URNS MICRO: ABNORMAL /LPF
CHLORIDE SERPL-SCNC: 107 MMOL/L (ref 98–107)
CO2 SERPL-SCNC: 24 MMOL/L (ref 22–29)
COLOR UR AUTO: ABNORMAL
CREAT SERPL-MCNC: 3.16 MG/DL (ref 0.55–1.02)
EOSINOPHIL # BLD AUTO: 0.43 X10(3)/MCL (ref 0–0.9)
EOSINOPHIL NFR BLD AUTO: 3.5 %
ERYTHROCYTE [DISTWIDTH] IN BLOOD BY AUTOMATED COUNT: 15.9 % (ref 11.5–17)
GLOBULIN SER-MCNC: 3 GM/DL (ref 2.4–3.5)
GLUCOSE SERPL-MCNC: 85 MG/DL (ref 74–100)
GLUCOSE UR QL STRIP.AUTO: NORMAL MG/DL
HCT VFR BLD AUTO: 36.8 % (ref 37–47)
HGB BLD-MCNC: 11.4 GM/DL (ref 12–16)
HYALINE CASTS #/AREA URNS LPF: ABNORMAL /LPF
IMM GRANULOCYTES # BLD AUTO: 0.09 X10(3)/MCL (ref 0–0.04)
IMM GRANULOCYTES NFR BLD AUTO: 0.7 %
KETONES UR QL STRIP.AUTO: NEGATIVE MG/DL
LEUKOCYTE ESTERASE UR QL STRIP.AUTO: 500 UNIT/L
LYMPHOCYTES # BLD AUTO: 5 X10(3)/MCL (ref 0.6–4.6)
LYMPHOCYTES NFR BLD AUTO: 40.2 %
MCH RBC QN AUTO: 26.6 PG (ref 27–31)
MCHC RBC AUTO-ENTMCNC: 31 MG/DL (ref 33–36)
MCV RBC AUTO: 86 FL (ref 80–94)
MONOCYTES # BLD AUTO: 1.28 X10(3)/MCL (ref 0.1–1.3)
MONOCYTES NFR BLD AUTO: 10.3 %
NEUTROPHILS # BLD AUTO: 5.6 X10(3)/MCL (ref 2.1–9.2)
NEUTROPHILS NFR BLD AUTO: 44.9 %
NITRITE UR QL STRIP.AUTO: NEGATIVE
NRBC BLD AUTO-RTO: 0 %
PH UR STRIP.AUTO: 6 [PH]
PHOSPHATE SERPL-MCNC: 2.1 MG/DL (ref 2.3–4.7)
PLATELET # BLD AUTO: 176 X10(3)/MCL (ref 130–400)
PMV BLD AUTO: 10.3 FL (ref 7.4–10.4)
POTASSIUM SERPL-SCNC: 3.6 MMOL/L (ref 3.5–5.1)
PROT SERPL-MCNC: 5.8 GM/DL (ref 6.4–8.3)
PROT UR QL STRIP.AUTO: NEGATIVE MG/DL
RBC # BLD AUTO: 4.28 X10(6)/MCL (ref 4.2–5.4)
RBC #/AREA URNS AUTO: ABNORMAL /HPF
RBC UR QL AUTO: NEGATIVE UNIT/L
SODIUM SERPL-SCNC: 141 MMOL/L (ref 136–145)
SP GR UR STRIP.AUTO: 1.01
SQUAMOUS #/AREA URNS LPF: ABNORMAL /HPF
UNIDENT CRYS #/AREA URNS HPF: ABNORMAL /HPF
UROBILINOGEN UR STRIP-ACNC: NORMAL MG/DL
WBC # SPEC AUTO: 12.5 X10(3)/MCL (ref 4.5–11.5)
WBC #/AREA URNS AUTO: ABNORMAL /HPF
YEAST BUDDING URNS QL: ABNORMAL /HPF

## 2022-07-24 PROCEDURE — 85025 COMPLETE CBC W/AUTO DIFF WBC: CPT | Performed by: STUDENT IN AN ORGANIZED HEALTH CARE EDUCATION/TRAINING PROGRAM

## 2022-07-24 PROCEDURE — 99900035 HC TECH TIME PER 15 MIN (STAT)

## 2022-07-24 PROCEDURE — 63600175 PHARM REV CODE 636 W HCPCS: Performed by: STUDENT IN AN ORGANIZED HEALTH CARE EDUCATION/TRAINING PROGRAM

## 2022-07-24 PROCEDURE — 94761 N-INVAS EAR/PLS OXIMETRY MLT: CPT

## 2022-07-24 PROCEDURE — 84100 ASSAY OF PHOSPHORUS: CPT | Performed by: STUDENT IN AN ORGANIZED HEALTH CARE EDUCATION/TRAINING PROGRAM

## 2022-07-24 PROCEDURE — 80053 COMPREHEN METABOLIC PANEL: CPT | Performed by: STUDENT IN AN ORGANIZED HEALTH CARE EDUCATION/TRAINING PROGRAM

## 2022-07-24 PROCEDURE — 25000003 PHARM REV CODE 250: Performed by: STUDENT IN AN ORGANIZED HEALTH CARE EDUCATION/TRAINING PROGRAM

## 2022-07-24 PROCEDURE — 36415 COLL VENOUS BLD VENIPUNCTURE: CPT | Performed by: STUDENT IN AN ORGANIZED HEALTH CARE EDUCATION/TRAINING PROGRAM

## 2022-07-24 PROCEDURE — 25000003 PHARM REV CODE 250: Performed by: INTERNAL MEDICINE

## 2022-07-24 PROCEDURE — 25000003 PHARM REV CODE 250: Performed by: FAMILY MEDICINE

## 2022-07-24 PROCEDURE — 11000001 HC ACUTE MED/SURG PRIVATE ROOM

## 2022-07-24 PROCEDURE — 81001 URINALYSIS AUTO W/SCOPE: CPT | Performed by: INTERNAL MEDICINE

## 2022-07-24 PROCEDURE — 94640 AIRWAY INHALATION TREATMENT: CPT

## 2022-07-24 PROCEDURE — 94760 N-INVAS EAR/PLS OXIMETRY 1: CPT

## 2022-07-24 RX ORDER — SODIUM,POTASSIUM PHOSPHATES 280-250MG
1 POWDER IN PACKET (EA) ORAL ONCE
Status: COMPLETED | OUTPATIENT
Start: 2022-07-24 | End: 2022-07-24

## 2022-07-24 RX ORDER — ERGOCALCIFEROL 1.25 MG/1
50000 CAPSULE ORAL
Status: DISCONTINUED | OUTPATIENT
Start: 2022-07-24 | End: 2022-07-28 | Stop reason: HOSPADM

## 2022-07-24 RX ADMIN — ERGOCALCIFEROL 50000 UNITS: 1.25 CAPSULE, LIQUID FILLED ORAL at 03:07

## 2022-07-24 RX ADMIN — PANTOPRAZOLE SODIUM 40 MG: 40 TABLET, DELAYED RELEASE ORAL at 08:07

## 2022-07-24 RX ADMIN — POTASSIUM, SODIUM PHOSPHATES 280 MG-160 MG-250 MG ORAL POWDER PACKET 1 PACKET: POWDER IN PACKET at 03:07

## 2022-07-24 RX ADMIN — HEPARIN SODIUM 5000 UNITS: 5000 INJECTION, SOLUTION INTRAVENOUS; SUBCUTANEOUS at 08:07

## 2022-07-24 RX ADMIN — DICYCLOMINE HYDROCHLORIDE 10 MG: 10 CAPSULE ORAL at 08:07

## 2022-07-24 RX ADMIN — FLUTICASONE FUROATE AND VILANTEROL TRIFENATATE 1 PUFF: 100; 25 POWDER RESPIRATORY (INHALATION) at 08:07

## 2022-07-24 RX ADMIN — HYDROXYZINE PAMOATE 25 MG: 25 CAPSULE ORAL at 08:07

## 2022-07-24 RX ADMIN — MUPIROCIN: 20 OINTMENT TOPICAL at 08:07

## 2022-07-24 RX ADMIN — PREDNISONE 10 MG: 10 TABLET ORAL at 08:07

## 2022-07-24 RX ADMIN — FOLIC ACID 1 MG: 1 TABLET ORAL at 08:07

## 2022-07-24 NOTE — PROGRESS NOTES
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 Calderon. Presents to ED as a transfer from [place] for ARF. She reports she began experiencing SOB around 5AM. Denies any f/c, cough, orthopnea, leg swelling. She has some SOB at baseline, uses 3L home O2 but reports this was worse than usual. She has a hx of recurrent PNA requiring hospitalization. She also reports 2-3 week histroy of generalized fatigue, n/v, and diarrhea. Having around 2 episodes of emesis per day, associated w/ meals, difficulty tolerating PO intake, also having multiple episodes of loose watery stools daily. Denies any blood in the vomit/stool. Denies chest pain, abdominal pain, dysuria. Reports she is urinating less often and smaller volume. She has not seen Dr. Calderon since last December however she reports compliance w/ her Actemra and Ofev. No longer taking prednisone daily since that time. She denies any recent sick contacts. Last hospitalized 1 month ago w/ PNA.     At outside facility, work up showed elevated WBC 15, BUN/Crt 54/9, no electrolyte abnormalities. Troponin, BNP WNL. COVID negative. UA showed WBCs and bacteria. She was given 1L NS and Rocephin x1, trasnfered to [facility] for ARF. In ED, /75, afebrile, O2 100% on 3L. On my interview, she is alert and oriented x3, in no distress, somewhat lethargic. Answers all questions appropriately. Reports her SOB has since resolved and has no acute complaints. Repeat labs here show increase in WBC to 19, BUN/Crt 55/9, AST 71, . CXR shows increased interstitial markings but no obvious opacities. Renal US unremarkable. Medicine consulted for ARF.     Interval Hx 7/24/22: No acute events  overnight. She reports abdominal pain was relieved with bentyl, denies any pain this AM.   Currently on clear liquid diet. Will ne NPO at midnight for kidney biopsy tomorrow. She is voiding and stooling appropriately. She denies fever, chills, headache, SOB, chest pain, and abdominal pain.     No current facility-administered medications on file prior to encounter.     Current Outpatient Medications on File Prior to Encounter   Medication Sig    gabapentin (NEURONTIN) 300 MG capsule Take 300 mg by mouth 2 (two) times a day.    nintedanib (OFEV) 150 mg Cap Ofev 150 mg capsule    tocilizumab (ACTEMRA) 162 mg/0.9 mL injection Inject 162 mg into the skin once a week.    albuterol (PROVENTIL) 2.5 mg /3 mL (0.083 %) nebulizer solution Take 3 mLs by nebulization every 8 (eight) hours as needed.    albuterol (PROVENTIL/VENTOLIN HFA) 90 mcg/actuation inhaler Inhale 2 puffs into the lungs every 6 (six) hours.    amLODIPine (NORVASC) 10 MG tablet Take 10 mg by mouth once daily.    budesonide-formoterol 160-4.5 mcg (SYMBICORT) 160-4.5 mcg/actuation HFAA Inhale 1 puff into the lungs daily as needed.    diclofenac sodium (VOLTAREN) 1 % Gel Apply 1 Tube topically every 6 (six) hours as needed.    ergocalciferol (ERGOCALCIFEROL) 50,000 unit Cap Take 1 capsule by mouth Daily.    ezetimibe (ZETIA) 10 mg tablet Take 10 mg by mouth every evening.    furosemide (LASIX) 40 MG tablet Take 40 mg by mouth Daily.    loratadine (CLARITIN) 10 mg tablet Take 10 mg by mouth once daily at 6am.    meloxicam (MOBIC) 15 MG tablet Take 15 mg by mouth once daily.    metoprolol tartrate (LOPRESSOR) 100 MG tablet Take 100 mg by mouth once daily.    omeprazole (PRILOSEC) 20 MG capsule Take 20 mg by mouth once daily.    ondansetron (ZOFRAN-ODT) 4 MG TbDL Take 4 mg by mouth every 6 (six) hours as needed.    predniSONE (DELTASONE) 10 MG tablet Take 10 mg by mouth Daily.     Family History    None       Tobacco Use    Smoking status: Not on  file    Smokeless tobacco: Not on file   Substance and Sexual Activity    Alcohol use: Not on file    Drug use: Not on file    Sexual activity: Not on file     ROS  See above       Objective:     Vital Signs (Most Recent):  Temp: 98.3 °F (36.8 °C) (07/24/22 0008)  Pulse: 102 (07/24/22 0008)  Resp: 17 (07/23/22 0816)  BP: 116/77 (07/24/22 0008)  SpO2: 100 % (07/24/22 0008) Vital Signs (24h Range):  Temp:  [97.5 °F (36.4 °C)-99 °F (37.2 °C)] 98.3 °F (36.8 °C)  Pulse:  [] 102  Resp:  [17-18] 17  SpO2:  [96 %-100 %] 100 %  BP: (110-132)/(69-92) 116/77     Weight: 79.6 kg (175 lb 7.8 oz) (Simultaneous filing. User may not have seen previous data.)  Body mass index is 40.79 kg/m².    Physical Exam  Constitutional:       General: She is not in acute distress.     Appearance: She is obese. She is not toxic-appearing.   HENT:      Head: Normocephalic and atraumatic.      Nose: Nose normal. No congestion.      Mouth/Throat:      Pharynx: Oropharynx is clear. No oropharyngeal exudate.   Eyes:      Extraocular Movements: Extraocular movements intact.      Conjunctiva/sclera: Conjunctivae normal.      Pupils: Pupils are equal, round, and reactive to light.   Neck:      Vascular: No carotid bruit.   Cardiovascular:      Rate and Rhythm: Normal rate and regular rhythm.      Pulses: Normal pulses.      Heart sounds: No murmur heard.    No gallop.   Pulmonary:      Effort: Pulmonary effort is normal. No respiratory distress.      Breath sounds: Normal breath sounds.   Abdominal:      General: Abdomen is flat. Bowel sounds are normal. There is no distension.      Palpations: Abdomen is soft.      Tenderness: There is no abdominal tenderness. There is no right CVA tenderness, left CVA tenderness or guarding.   Musculoskeletal:         General: No deformity.      Right lower leg: No edema.      Left lower leg: No edema.   Skin:     General: Skin is cool.      Capillary Refill: Capillary refill takes 2 to 3 seconds.       Coloration: Skin is not jaundiced.      Findings: No erythema or rash.   Neurological:      General: No focal deficit present.      Mental Status: She is alert and oriented to person, place, and time.      Sensory: No sensory deficit.      Motor: No weakness.       Assessment/Plan:   ARF, improving  -This AM BUN/Cr 7.2/3.16  -On initial presentation BUN/Cr 55.3/9.34  -Differential includes AIN vs glomerulonephritis 2/2 to rheumatologic disease   -Continue IVF with 0.9% NaCl at 100 ml/hr  -Pending prt/crt, INES, SPEP   -C3 and C4 WNL, p-ANCA, c-ANCA WNL, urine Na WNL  -Surgery consulted for dialysis catheter, catheter placed on 7/20. Received first dialysis session on 7/20, 0 ml removed. Received another of dialysis on 7/21 and 400 ml removed, and third session on 7/22 and 1L was removed  -Nephrology consulted, appreciate them on the case and following. Thank you for all the recommendations  -Plan for kidney biopsy with IR on Monday, will need to hold  Heparin tonight and Monday AM    Leukocytosis, down trending   UTI   Hx of recurrent PNAs   -Received Rocephin at outside facility   -On initial presentation WBC increased to 19, reports she has not been on PO steroids for months now  -Restarted home Prednisone 10 mg on 7/23  -On immuno modulators, holding at this time    -Lactic acid WNL    -Flagyl and Cefepime d/c on 7/22  -Blood cultures showed NG at 72 hrs. UCx showed NG    Folate deficiency  -Folate 3.2  -Continue folic acid     Antisythetase syndrome  -C3 and C4 WNL, c-ANCA and p-ANCA negative  -Patient is on board for sending referral to Rheumatology to anyone in [place] that will accept her insurance. CM helping to find accepting providers       Code: FULL code   DVT: Heparin   Lines: PIV   Abx: None  Fluids: 0.9% NaCl at 100 ml/hr    Dispo: 48 y/o female admitted to Medicine for ARF. Hx of Antisythetase syndrome. On clear liquid diet. NPO at midnight for kidney biopsy on Monday, will receive today's dose at 9  am of Heparin and then hold Heparin tonight and Monday AM.      Molly Rey MD  [facility], Department of Family Medicine

## 2022-07-24 NOTE — PROGRESS NOTES
"Nephrology Progress Note    Hospital course:   47-year-old  female with a history of anti since the taste syndrome.  She also has history of inflammatory arthritis Raynaud's associated with IOL D which pretty much defines antisynthetase syndrome.  She was following with Dr. SAE Calderon.  She was on immunosuppressive therapy to include Actemra and Ofev.  She discontinued prednisone since December of 2021. Patient was admitted as a transfer from [place] due to acute renal failure.  Patient reported that she had nausea vomiting and diarrhea for at least 3-4 days prior to admission.  Patient required hemodialysis for clearance purposes.  Her nausea and vomiting have improved.  She did not have dialysis yesterday.  We will monitor for signs of renal function recovery.  She is also scheduled for kidney biopsy possibly tomorrow morning or Tuesday morning.  Heparin will be held in preparation for kidney biopsy.  Blood pressure has been very well controlled.    Subjective:   Today patient reports feeling better and appetite has improved.    Objective:   /84   Pulse 100   Temp 98 °F (36.7 °C) (Oral)   Resp 17   Ht 4' 7" (1.397 m)   Wt 79.6 kg (175 lb 7.8 oz) Comment: Simultaneous filing. User may not have seen previous data.  SpO2 100%   Breastfeeding No   BMI 40.79 kg/m²   No intake or output data in the 24 hours ending 07/24/22 1414     Physical exam:   Vitals noted.  General:  Alert and oriented to person place and time no acute distress   HEENT:  Pupils equal round reactive to light extraocular muscles intact mouth moist mucous membranes appreciated  Neck no JVD bruit thyromegaly adenopathy appreciated  Lungs:  Clear to auscultation bilaterally all field  Cardiovascular regular rate and rhythm.  No murmur or gallop  Abdomen:  Positive bowel sounds all 4 quadrants.  Nondistended nontender  Extremities:  No clubbing cyanosis or edema noted  Neurologic:  On cranial nerves 2-12 grossly intact no " clonus asterixis appreciated.      Laboratory data:   Recent Results (from the past 24 hour(s))   Comprehensive Metabolic Panel    Collection Time: 07/24/22  4:05 AM   Result Value Ref Range    Sodium Level 141 136 - 145 mmol/L    Potassium Level 3.6 3.5 - 5.1 mmol/L    Chloride 107 98 - 107 mmol/L    Carbon Dioxide 24 22 - 29 mmol/L    Glucose Level 85 74 - 100 mg/dL    Blood Urea Nitrogen 7.2 7.0 - 18.7 mg/dL    Creatinine 3.16 (H) 0.55 - 1.02 mg/dL    Calcium Level Total 8.2 (L) 8.4 - 10.2 mg/dL    Protein Total 5.8 (L) 6.4 - 8.3 gm/dL    Albumin Level 2.8 (L) 3.5 - 5.0 gm/dL    Globulin 3.0 2.4 - 3.5 gm/dL    Albumin/Globulin Ratio 0.9 (L) 1.1 - 2.0 ratio    Bilirubin Total 0.3 <=1.5 mg/dL    Alkaline Phosphatase 77 40 - 150 unit/L    Alanine Aminotransferase 49 0 - 55 unit/L    Aspartate Aminotransferase 41 (H) 5 - 34 unit/L    Estimated GFR- 20 mls/min/1.73/m2   Phosphorus    Collection Time: 07/24/22  4:05 AM   Result Value Ref Range    Phosphorus Level 2.1 (L) 2.3 - 4.7 mg/dL   CBC with Differential    Collection Time: 07/24/22  4:05 AM   Result Value Ref Range    WBC 12.5 (H) 4.5 - 11.5 x10(3)/mcL    RBC 4.28 4.20 - 5.40 x10(6)/mcL    Hgb 11.4 (L) 12.0 - 16.0 gm/dL    Hct 36.8 (L) 37.0 - 47.0 %    MCV 86.0 80.0 - 94.0 fL    MCH 26.6 (L) 27.0 - 31.0 pg    MCHC 31.0 (L) 33.0 - 36.0 mg/dL    RDW 15.9 11.5 - 17.0 %    Platelet 176 130 - 400 x10(3)/mcL    MPV 10.3 7.4 - 10.4 fL    Neut % 44.9 %    Lymph % 40.2 %    Mono % 10.3 %    Eos % 3.5 %    Basophil % 0.4 %    Lymph # 5.00 (H) 0.6 - 4.6 x10(3)/mcL    Neut # 5.6 2.1 - 9.2 x10(3)/mcL    Mono # 1.28 0.1 - 1.3 x10(3)/mcL    Eos # 0.43 0 - 0.9 x10(3)/mcL    Baso # 0.05 0 - 0.2 x10(3)/mcL    IG# 0.09 (H) 0 - 0.04 x10(3)/mcL    IG% 0.7 %    NRBC% 0.0 %       Imaging:   Imaging Results          X-Ray Chest 1 View (Final result)  Result time 07/20/22 06:32:04   Procedure changed from X-Ray Chest PA And Lateral     Final result by Gabriel Clarke,  MD (07/20/22 06:32:04)                 Impression:      No acute cardiopulmonary process.  Findings of chronic lung disease.      Electronically signed by: Gabriel Vince  Date:    07/20/2022  Time:    06:32             Narrative:    EXAMINATION:  XR CHEST 1 VIEW    CLINICAL HISTORY:  Shortness of breath;SOB;    TECHNIQUE:  Single view of the chest    COMPARISON:  07/19/2022    FINDINGS:  Prominent interstitial markings with no focal opacification.    The cardiomediastinal silhouette is within normal limits.    No acute osseous abnormality.                                 Medications:     Current Facility-Administered Medications:     acetaminophen tablet 1,000 mg, 1,000 mg, Oral, Q6H PRN, Molly Rey MD    albuterol-ipratropium 2.5 mg-0.5 mg/3 mL nebulizer solution 3 mL, 3 mL, Nebulization, Q6H PRN, Savage Kimble MD    dextrose 10% bolus 125 mL, 12.5 g, Intravenous, PRN, Kashmir Box MD    dextrose 10% bolus 250 mL, 25 g, Intravenous, PRN, Kashmir Box MD    dicyclomine capsule 10 mg, 10 mg, Oral, BID, Molly Rey MD, 10 mg at 07/24/22 0826    ergocalciferol capsule 50,000 Units, 50,000 Units, Oral, Q7 Days, Evie Richter MD    fluticasone furoate-vilanteroL 100-25 mcg/dose diskus inhaler 1 puff, 1 puff, Inhalation, Daily, 1 puff at 07/24/22 0852 **AND** MDI Daily, , , Daily, Molly Rey MD    folic acid tablet 1 mg, 1 mg, Oral, Daily, Molly Rey MD, 1 mg at 07/24/22 0826    glucagon (human recombinant) injection 1 mg, 1 mg, Intramuscular, PRN, Savage Kimble MD    glucose chewable tablet 16 g, 16 g, Oral, PRN, Savage Kimble MD    glucose chewable tablet 24 g, 24 g, Oral, PRN, Savage Kimble MD    hydrOXYzine pamoate capsule 25 mg, 25 mg, Oral, Nightly PRN, Hai Bills MD, 25 mg at 07/23/22 7613    lactated ringers infusion, , Intravenous, Continuous, Evie Richter MD, Last Rate: 100  mL/hr at 07/23/22 0832, New Bag at 07/23/22 0832    melatonin tablet 6 mg, 6 mg, Oral, Nightly PRN, Hai Bills MD, 6 mg at 07/20/22 2056    mupirocin 2 % ointment, , Nasal, BID, Kashmir Box MD, Given at 07/24/22 0827    naloxone 0.4 mg/mL injection 0.02 mg, 0.02 mg, Intravenous, PRN, Savage Kimble MD    ondansetron injection 8 mg, 8 mg, Intravenous, Q8H PRN, Molly Rey MD, 8 mg at 07/22/22 0848    pantoprazole EC tablet 40 mg, 40 mg, Oral, Daily, Molly Rey MD, 40 mg at 07/24/22 0826    predniSONE tablet 10 mg, 10 mg, Oral, Daily, Molly Rey MD, 10 mg at 07/24/22 0826    sodium chloride 0.9% flush 10 mL, 10 mL, Intravenous, Q12H PRN, Savage Kimble MD     Impression/Plan:    1. Acute renal failure:  Nonoliguric.  Etiology unclear at this time.  Patient is scheduled for kidney biopsy possibly tomorrow morning or Tuesday morning at the latest.  Patient does have some underlying chronic kidney disease as evidenced by intact PTH level.  Patient did not have active urinary sediment and nephritic picture was not identified.  Anca and complement levels are negative.  Anti-double-stranded DNA was negative as well.  There have been case reports in the literature of antisynthetase syndrome patient is having acute renal failure due to renal vascular damage due to edematous thickening of the intima of the arterioles.  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    Patient may have a component of ischemic acute tubular necrosis due to hypotension and hypoperfusion as patient did have nausea vomiting and diarrhea for several days prior to admission with a very low blood pressure upon admission.    Continue IV fluid resuscitation.  Will monitor for signs of renal function recovery.  Will also provide hemodialysis as needed.    2. Hypophosphatemia:  Replete with Neutra-Phos tablet.  Will check phosphorus level in a.m.    3. Folate  deficiency:  Patient is on folic acid repletion.    4. Vitamin-D deficiency:  Will give vitamin-D 33182 units p.o. x1.  Patient should receive for at least 4 doses possibly 6. Repeat vitamin-D level.    5. Secondary hyperparathyroidism:  Likely due to underlying CKD.  Patient's baseline creatinine is unclear although it is reported that patient's creatinine was below 1 last year.  Will need to monitor.  Patient will require calcitriol if intact PTH remains greater than 150.     6. Antisynthetase syndrome:  Patient is now on prednisone 10 mg p.o. daily.  She is not currently on her previous immunosuppressive regimen.  We should discuss with Rheumatology as to the regimen the patient will be discharged home going forward.    7. Normochromic normocytic anemia:  Was documented folate deficiency.  Patient is on folic acid repletion.  Stable hemoglobin hematocrit today.    8. Metabolic acidosis:  Has stabilized and corrected with dialysis and with lactated Ringer's.  Will recheck CMP to further assess acid-base status in a.m.    9. Leukocytosis:  Patient is afebrile.  Likely due to prednisone.  Will monitor will recheck CBC in a.m. recheck urinalysis today.    Evie Richter M.D.  Nephrology

## 2022-07-25 LAB
ALBUMIN SERPL-MCNC: 2.7 GM/DL (ref 3.5–5)
ALBUMIN/GLOB SERPL: 1 RATIO (ref 1.1–2)
ALP SERPL-CCNC: 61 UNIT/L (ref 40–150)
ALT SERPL-CCNC: 44 UNIT/L (ref 0–55)
APTT PPP: 32 SECONDS
AST SERPL-CCNC: 42 UNIT/L (ref 5–34)
BACTERIA BLD CULT: NORMAL
BACTERIA BLD CULT: NORMAL
BASOPHILS # BLD AUTO: 0.05 X10(3)/MCL (ref 0–0.2)
BASOPHILS NFR BLD AUTO: 0.4 %
BILIRUBIN DIRECT+TOT PNL SERPL-MCNC: 0.3 MG/DL
BUN SERPL-MCNC: 5.6 MG/DL (ref 7–18.7)
CALCIUM SERPL-MCNC: 8.1 MG/DL (ref 8.4–10.2)
CHLORIDE SERPL-SCNC: 107 MMOL/L (ref 98–107)
CO2 SERPL-SCNC: 28 MMOL/L (ref 22–29)
CREAT SERPL-MCNC: 2.02 MG/DL (ref 0.55–1.02)
EOSINOPHIL # BLD AUTO: 0.15 X10(3)/MCL (ref 0–0.9)
EOSINOPHIL NFR BLD AUTO: 1.2 %
ERYTHROCYTE [DISTWIDTH] IN BLOOD BY AUTOMATED COUNT: 15.9 % (ref 11.5–17)
GLOBULIN SER-MCNC: 2.8 GM/DL (ref 2.4–3.5)
GLUCOSE SERPL-MCNC: 89 MG/DL (ref 74–100)
HCT VFR BLD AUTO: 34.6 % (ref 37–47)
HGB BLD-MCNC: 10.8 GM/DL (ref 12–16)
IMM GRANULOCYTES # BLD AUTO: 0.17 X10(3)/MCL (ref 0–0.04)
IMM GRANULOCYTES NFR BLD AUTO: 1.4 %
LYMPHOCYTES # BLD AUTO: 5.14 X10(3)/MCL (ref 0.6–4.6)
LYMPHOCYTES NFR BLD AUTO: 40.9 %
MAGNESIUM SERPL-MCNC: 1.1 MG/DL (ref 1.6–2.6)
MAYO GENERIC ORDERABLE RESULT: NORMAL
MCH RBC QN AUTO: 26.7 PG (ref 27–31)
MCHC RBC AUTO-ENTMCNC: 31.2 MG/DL (ref 33–36)
MCV RBC AUTO: 85.4 FL (ref 80–94)
MONOCYTES # BLD AUTO: 1.14 X10(3)/MCL (ref 0.1–1.3)
MONOCYTES NFR BLD AUTO: 9.1 %
NEUTROPHILS # BLD AUTO: 5.9 X10(3)/MCL (ref 2.1–9.2)
NEUTROPHILS NFR BLD AUTO: 47 %
NRBC BLD AUTO-RTO: 0 %
PHOSPHATE SERPL-MCNC: 2.1 MG/DL (ref 2.3–4.7)
PLATELET # BLD AUTO: 181 X10(3)/MCL (ref 130–400)
PMV BLD AUTO: 10.2 FL (ref 7.4–10.4)
POTASSIUM SERPL-SCNC: 3.4 MMOL/L (ref 3.5–5.1)
PROT SERPL-MCNC: 5.5 GM/DL (ref 6.4–8.3)
RBC # BLD AUTO: 4.05 X10(6)/MCL (ref 4.2–5.4)
SODIUM SERPL-SCNC: 144 MMOL/L (ref 136–145)
WBC # SPEC AUTO: 12.6 X10(3)/MCL (ref 4.5–11.5)

## 2022-07-25 PROCEDURE — 36415 COLL VENOUS BLD VENIPUNCTURE: CPT | Performed by: STUDENT IN AN ORGANIZED HEALTH CARE EDUCATION/TRAINING PROGRAM

## 2022-07-25 PROCEDURE — 83735 ASSAY OF MAGNESIUM: CPT | Performed by: STUDENT IN AN ORGANIZED HEALTH CARE EDUCATION/TRAINING PROGRAM

## 2022-07-25 PROCEDURE — 99233 PR SUBSEQUENT HOSPITAL CARE,LEVL III: ICD-10-PCS | Mod: ,,, | Performed by: NURSE PRACTITIONER

## 2022-07-25 PROCEDURE — 84100 ASSAY OF PHOSPHORUS: CPT | Performed by: STUDENT IN AN ORGANIZED HEALTH CARE EDUCATION/TRAINING PROGRAM

## 2022-07-25 PROCEDURE — 94640 AIRWAY INHALATION TREATMENT: CPT

## 2022-07-25 PROCEDURE — 99233 SBSQ HOSP IP/OBS HIGH 50: CPT | Mod: ,,, | Performed by: NURSE PRACTITIONER

## 2022-07-25 PROCEDURE — 80053 COMPREHEN METABOLIC PANEL: CPT | Performed by: STUDENT IN AN ORGANIZED HEALTH CARE EDUCATION/TRAINING PROGRAM

## 2022-07-25 PROCEDURE — 25000003 PHARM REV CODE 250: Performed by: STUDENT IN AN ORGANIZED HEALTH CARE EDUCATION/TRAINING PROGRAM

## 2022-07-25 PROCEDURE — 94760 N-INVAS EAR/PLS OXIMETRY 1: CPT

## 2022-07-25 PROCEDURE — 27000221 HC OXYGEN, UP TO 24 HOURS

## 2022-07-25 PROCEDURE — 99900035 HC TECH TIME PER 15 MIN (STAT)

## 2022-07-25 PROCEDURE — 36410 VNPNXR 3YR/> PHY/QHP DX/THER: CPT

## 2022-07-25 PROCEDURE — 25000003 PHARM REV CODE 250: Performed by: NURSE PRACTITIONER

## 2022-07-25 PROCEDURE — 11000001 HC ACUTE MED/SURG PRIVATE ROOM

## 2022-07-25 PROCEDURE — 63600175 PHARM REV CODE 636 W HCPCS: Performed by: STUDENT IN AN ORGANIZED HEALTH CARE EDUCATION/TRAINING PROGRAM

## 2022-07-25 PROCEDURE — 85025 COMPLETE CBC W/AUTO DIFF WBC: CPT | Performed by: STUDENT IN AN ORGANIZED HEALTH CARE EDUCATION/TRAINING PROGRAM

## 2022-07-25 PROCEDURE — A4216 STERILE WATER/SALINE, 10 ML: HCPCS | Performed by: STUDENT IN AN ORGANIZED HEALTH CARE EDUCATION/TRAINING PROGRAM

## 2022-07-25 PROCEDURE — 85730 THROMBOPLASTIN TIME PARTIAL: CPT | Performed by: STUDENT IN AN ORGANIZED HEALTH CARE EDUCATION/TRAINING PROGRAM

## 2022-07-25 PROCEDURE — 94761 N-INVAS EAR/PLS OXIMETRY MLT: CPT

## 2022-07-25 PROCEDURE — C1751 CATH, INF, PER/CENT/MIDLINE: HCPCS

## 2022-07-25 PROCEDURE — 85610 PROTHROMBIN TIME: CPT | Performed by: STUDENT IN AN ORGANIZED HEALTH CARE EDUCATION/TRAINING PROGRAM

## 2022-07-25 RX ORDER — POTASSIUM CHLORIDE 20 MEQ/1
40 TABLET, EXTENDED RELEASE ORAL ONCE
Status: COMPLETED | OUTPATIENT
Start: 2022-07-25 | End: 2022-07-25

## 2022-07-25 RX ORDER — POTASSIUM CHLORIDE 20 MEQ/1
20 TABLET, EXTENDED RELEASE ORAL ONCE
Status: COMPLETED | OUTPATIENT
Start: 2022-07-25 | End: 2022-07-25

## 2022-07-25 RX ORDER — SODIUM CHLORIDE 0.9 % (FLUSH) 0.9 %
10 SYRINGE (ML) INJECTION
Status: DISCONTINUED | OUTPATIENT
Start: 2022-07-25 | End: 2022-07-28 | Stop reason: HOSPADM

## 2022-07-25 RX ORDER — SODIUM CHLORIDE 0.9 % (FLUSH) 0.9 %
10 SYRINGE (ML) INJECTION EVERY 6 HOURS
Status: DISCONTINUED | OUTPATIENT
Start: 2022-07-25 | End: 2022-07-28 | Stop reason: HOSPADM

## 2022-07-25 RX ADMIN — PANTOPRAZOLE SODIUM 40 MG: 40 TABLET, DELAYED RELEASE ORAL at 08:07

## 2022-07-25 RX ADMIN — SODIUM CHLORIDE, PRESERVATIVE FREE 10 ML: 5 INJECTION INTRAVENOUS at 05:07

## 2022-07-25 RX ADMIN — DICYCLOMINE HYDROCHLORIDE 10 MG: 10 CAPSULE ORAL at 08:07

## 2022-07-25 RX ADMIN — FOLIC ACID 1 MG: 1 TABLET ORAL at 08:07

## 2022-07-25 RX ADMIN — FLUTICASONE FUROATE AND VILANTEROL TRIFENATATE 1 PUFF: 100; 25 POWDER RESPIRATORY (INHALATION) at 07:07

## 2022-07-25 RX ADMIN — PREDNISONE 10 MG: 10 TABLET ORAL at 08:07

## 2022-07-25 RX ADMIN — POTASSIUM CHLORIDE 40 MEQ: 20 TABLET, EXTENDED RELEASE ORAL at 10:07

## 2022-07-25 RX ADMIN — POTASSIUM CHLORIDE 20 MEQ: 1500 TABLET, EXTENDED RELEASE ORAL at 05:07

## 2022-07-25 RX ADMIN — SODIUM CHLORIDE, PRESERVATIVE FREE 10 ML: 5 INJECTION INTRAVENOUS at 12:07

## 2022-07-25 NOTE — PROGRESS NOTES
[facility] - [facility] Surg Telemetry  Adult Nutrition  Progress Note    SUMMARY       Recommendations  1. Continue Renal on Dialysis diet as tolerated        2. Will send Novasource BID, to provide 475 kcals and 22g pro per serving        3. If unable to tolerate po intake, consider TPN; Clinimix 5/20 @ 75mL/hr, 20% lipids every other day, to provide 2084 kcals (103% est needs), 90g pro (100% est needs), 1800mL fluid; GIR 3.14         4. Replenish electrolytes as needed    5. Will continue to monitor po intake, diet tolearnce vs need for PN, wt, labs    Goals: Meet greater than 75% of nutritional needs; Maintain wt throughout hospitalization  Nutrition Goal Status: progressing towards goal      Reason for Assessment  Reason For Assessment: RD follow-up    Diagnosis: Acute renal failure    Anion gap Metabolic acidosis    Hypokalemia    Hypotension    Hyperphosphatemia    Transaminitis    Hypoglycemia    Relevant Medical History: antisynthetase syndrome ,ILD hypertension, vitamin-D deficiency, Raynaud's, inflammatory arthritis, peripheral neuropathy        Assessment and Plan  7/25: Diet upgraded to Renal on Dialysis today; pt to get meal at lunch. Was NPO for possible kidney biopsy; moved to 7/26. Pt ok to add Novasource vanilla BID. Pt reports some nausea and diarrhea. Noted PICC placed; TPN recs in note if pt unable to tolerate oral intake. Pt reports no dialysis over the weekend; will continue to monitor renal function.      Malnutrition Assessment  Malnutrition Type: acute illness or injury (N/A)  Weight Loss (Malnutrition):  (Unable to verify)  Energy Intake (Malnutrition): less than or equal to 50% for greater than or equal to 5 days  Subcutaneous Fat (Malnutrition):  (Does not meet criteria)  Muscle Mass (Malnutrition):  (Does not meet criteria)   Edema (Fluid Accumulation): 1-->trace       Nutrition Risk Screen  Nutrition Risk Screen: no indicators present    Nutrition/Diet History  Spiritual,  "Cultural Beliefs, Orthodox Practices, Values that Affect Care: no  Food Allergies: NKFA  Factors Affecting Nutritional Intake: decreased appetite, diarrhea, nausea/vomiting      Anthropometrics  Temp: 97.4 °F (36.3 °C)  Height Method: Estimated  Height: 4' 7" (139.7 cm)  Height (inches): 55 in  Weight Method: Estimated  Weight: 79.6 kg (175 lb 7.8 oz)  Weight (lb): 175.49 lb  Ideal Body Weight (IBW), Female: 75 lb  % Ideal Body Weight, Female (lb): 233.99 %  BMI (Calculated): 40.8  BMI Grade: greater than 40 - morbid obesity  Usual Body Weight (UBW), k.8 kg  % Usual Body Weight: 88.69  % Weight Change From Usual Weight: -11.49 %       Lab/Procedures/Meds  Pertinent Labs Comments: -K 3.4, BUN 5.6, Creat 2.02, Wesley 8.1, Pro Tot 5.5, Alb 2.7, AST 42, GFR 34, Mg 1.10, Phos 2.1     Pertinent Medications Comments: dicyclomine, ergocalciferol, folic acid, protonix, KCl, prednisone, LR, zofran      Estimated/Assessed Needs  Weight Used For Calorie Calculations: 72.4 kg (159 lb 9.8 oz)  Energy Calorie Requirements (kcal): 6781-3294 kcals (25-28 kcal/kg)  Protein Requirements: 73g (1.0 g/kg; If HD initiated increase pro to 94g (1.3 g/kg))  Weight Used For Protein Calculations: 72.4 kg (159 lb 9.8 oz)  Fluid Requirements (mL): per MD; 1000mL + output if HD initiated  RDA Method (mL): 1810         Nutrition Prescription Ordered  Current Diet Order: Renal on Dialysis  Comments: Pt to get first meal at lunch      Evaluation of Received Nutrient/Fluid Intake  % Intake of Estimated Energy Needs: Other: No attempt at po intake  % Meal Intake: Other: Pt to get meal at lunch      Nutrition Problem  Inadequate Energy Intake    Related to (etiology):   Decreased ability to consume sufficient energy    Signs and Symptoms (as evidenced by):   NPO status, </= 50% po intake >5 days    Interventions(treatment strategy):  General / Healthful Diet, Modified rate of parenteral nutrition, Modified composition of parenteral nutrition, " Commercial beverage and Collaboration with other providers    Nutrition Diagnosis Status:   Continues      Nutrition Risk  Level of Risk/Frequency of Follow-up: moderate     Monitor and Evaluation  Food and Nutrient Intake: energy intake  Food and Nutrient Adminstration: enteral and parenteral nutrition administration  Anthropometric Measurements: weight change  Biochemical Data, Medical Tests and Procedures: electrolyte and renal panel, glucose/endocrine profile     Nutrition Follow-Up  RD Follow-up?: Yes      Vandana Lindsay RD

## 2022-07-25 NOTE — PLAN OF CARE
Problem: Adult Inpatient Plan of Care  Goal: Plan of Care Review  Outcome: Ongoing, Progressing  Goal: Patient-Specific Goal (Individualized)  Outcome: Ongoing, Progressing  Goal: Absence of Hospital-Acquired Illness or Injury  Outcome: Ongoing, Progressing  Goal: Optimal Comfort and Wellbeing  Outcome: Ongoing, Progressing  Goal: Readiness for Transition of Care  Outcome: Ongoing, Progressing     Problem: Oral Intake Inadequate (Acute Kidney Injury/Impairment)  Goal: Optimal Nutrition Intake  Outcome: Ongoing, Progressing     Problem: Renal Function Impairment (Acute Kidney Injury/Impairment)  Goal: Effective Renal Function  Outcome: Ongoing, Progressing     Problem: Skin Injury Risk Increased  Goal: Skin Health and Integrity  Outcome: Ongoing, Progressing     Problem: Infection  Goal: Absence of Infection Signs and Symptoms  Outcome: Ongoing, Progressing

## 2022-07-25 NOTE — PLAN OF CARE
Attending Addendum           Treatment plan was discussed with the team on rounds this morning. Plan of care, as delineated in progress note, is reasonable and appropriate.     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      Patient was seen and examined this morning. On physical exam, RRR, CTAB, bowel sounds present.   Patient admitted for further evaluation and care in the setting of ARF. Etiology at this juncture is uncertain. There is a plan for renal biopsy tomorrow.  Rheumatologic workup is negative thus far. Renal indicies continue to improve and patient was able to go without dialysis over the weekend. Continue close monitoring for now.           Keiko Soto, DO

## 2022-07-25 NOTE — HPI
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 Calderon. Presents to ED as a transfer from [place] for ARF. She reports she began experiencing SOB around 5AM. Denies any f/c, cough, orthopnea, leg swelling. She has some SOB at baseline, uses 3L home O2 but reports this was worse than usual. She has a hx of recurrent PNA requiring hospitalization. She also reports 2-3 week histroy of generalized fatigue, n/v, and diarrhea. Having around 2 episodes of emesis per day, associated w/ meals, difficulty tolerating PO intake, also having multiple episodes of loose watery stools daily. Denies any blood in the vomit/stool. Denies chest pain, abdominal pain, dysuria. Reports she is urinating less often and smaller volume. She has not seen Dr. Calderon since last December however she reports compliance w/ her Actemra and Ofev. No longer taking prednisone daily since that time. She denies any recent sick contacts. Last hospitalized 1 month ago w/ PNA.      At outside facility, work up showed elevated WBC 15, BUN/Crt 54/9, no electrolyte abnormalities. Troponin, BNP WNL. COVID negative. UA showed WBCs and bacteria. She was given 1L NS and Rocephin x1, trasnfered to [facility] for ARF. In ED, /75, afebrile, O2 100% on 3L. On my interview, she is alert and oriented x3, in no distress, somewhat lethargic. Answers all questions appropriately. Reports her SOB has since resolved and has no acute complaints. Repeat labs here show increase in WBC to 19, BUN/Crt 55/9, AST 71, . CXR shows increased interstitial markings but no obvious opacities. Renal US unremarkable. Medicine consulted for ARF.

## 2022-07-25 NOTE — SUBJECTIVE & OBJECTIVE
Interval History:  No adverse events overnight.  Nurse reports patient complaining of abdominal pain and diarrhea, though she has not had any diarrhea for the last 3 days.     Review of Systems   Constitutional:  Negative for chills and fever.   Eyes:  Negative for visual disturbance.   Respiratory:  Negative for chest tightness, shortness of breath and wheezing.    Cardiovascular:  Negative for chest pain and palpitations.   Gastrointestinal:  Negative for abdominal pain, constipation, diarrhea, nausea and vomiting.   Genitourinary:  Negative for dysuria.   Skin:  Negative for rash.   Neurological:  Negative for headaches.   Objective:     Vital Signs (Most Recent):  Temp: 97.4 °F (36.3 °C) (07/25/22 0740)  Pulse: 87 (07/25/22 0740)  Resp: 20 (07/25/22 0740)  BP: 131/85 (07/25/22 0740)  SpO2: 99 % (07/25/22 0740) Vital Signs (24h Range):  Temp:  [97.4 °F (36.3 °C)-98.8 °F (37.1 °C)] 97.4 °F (36.3 °C)  Pulse:  [] 87  Resp:  [16-20] 20  SpO2:  [95 %-100 %] 99 %  BP: (123-136)/(76-88) 131/85     Weight: 79.6 kg (175 lb 7.8 oz) (Simultaneous filing. User may not have seen previous data.)  Body mass index is 40.79 kg/m².  No intake or output data in the 24 hours ending 07/25/22 0741   Physical Exam  Constitutional:       Appearance: She is obese. She is not ill-appearing.      Comments: Lying comfortably in bed, easily awoken from sleep   HENT:      Head: Atraumatic.      Comments: Nasal cannula in place     Right Ear: External ear normal.      Left Ear: External ear normal.      Nose: Nose normal.      Mouth/Throat:      Mouth: Mucous membranes are moist.      Pharynx: Oropharynx is clear.   Eyes:      General: No scleral icterus.     Pupils: Pupils are equal, round, and reactive to light.   Cardiovascular:      Rate and Rhythm: Normal rate and regular rhythm.      Pulses: Normal pulses.   Pulmonary:      Effort: Pulmonary effort is normal.      Breath sounds: No wheezing.   Abdominal:      General: There is no  distension.      Palpations: Abdomen is soft.      Tenderness: There is no abdominal tenderness.   Musculoskeletal:      Cervical back: Neck supple.   Skin:     General: Skin is warm and dry.      Capillary Refill: Capillary refill takes less than 2 seconds.      Findings: No rash.   Neurological:      Comments: Patient is alert oriented.  She responds appropriately to questions and commands   Psychiatric:         Thought Content: Thought content normal.       Significant Labs: All pertinent labs within the past 24 hours have been reviewed.  Recent Lab Results         07/25/22  0708   07/24/22  2115        Albumin/Globulin Ratio 1.0         Albumin 2.7         Alkaline Phosphatase 61         ALT 44         Appearance, UA   Clear       AST 42         Bacteria, UA   Trace       Baso # 0.05         Basophil % 0.4         BILIRUBIN TOTAL 0.3         Bilirubin, UA   Negative       Budding Yeast, UA   Trace       BUN 5.6         Calcium 8.1         Chloride 107         CO2 28         Color, UA   Light-Yellow       Creatinine 2.02         eGFR if  34         Eos # 0.15         Eosinophil % 1.2         Globulin, Total 2.8         Glucose 89         Glucose, UA   Normal       Hematocrit 34.6         Hemoglobin 10.8         Hyaline Casts, UA   0-2       Immature Grans (Abs) 0.17         Immature Granulocytes 1.4         Ketones, UA   Negative       Leukocytes, UA   500        Lymph # 5.14         LYMPH % 40.9         Magnesium 1.10         MCH 26.7         MCHC 31.2         MCV 85.4         Mono # 1.14         Mono % 9.1         MPV 10.2         Neut # 5.9         Neut % 47.0         NITRITE UA   Negative       nRBC 0.0         Occult Blood UA   Negative       pH, UA   6.0       Phosphorus 2.1         Platelets 181         Potassium 3.4         PROTEIN TOTAL 5.5         Protein, UA   Negative       RBC 4.05         RBC, UA   21-50       RDW 15.9         Sodium 144         Specific Gravity,UA   1.006        Squamous Epithelial Cells, UA   Moderate       Unclassified Cast, UA   0-2       Unclassified Crystal, UA   None Seen       Urobilinogen, UA   Normal       WBC, UA   6-10       WBC 12.6                 Significant Imaging: no recent imaging

## 2022-07-25 NOTE — PROGRESS NOTES
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 Calderon. Presents to ED as a transfer from [place] for ARF. She reports she began experiencing SOB around 5AM. Denies any f/c, cough, orthopnea, leg swelling. She has some SOB at baseline, uses 3L home O2 but reports this was worse than usual. She has a hx of recurrent PNA requiring hospitalization. She also reports 2-3 week histroy of generalized fatigue, n/v, and diarrhea. Having around 2 episodes of emesis per day, associated w/ meals, difficulty tolerating PO intake, also having multiple episodes of loose watery stools daily. Denies any blood in the vomit/stool. Denies chest pain, abdominal pain, dysuria. Reports she is urinating less often and smaller volume. She has not seen Dr. Calderon since last December however she reports compliance w/ her Actemra and Ofev. No longer taking prednisone daily since that time. She denies any recent sick contacts. Last hospitalized 1 month ago w/ PNA.     At outside facility, work up showed elevated WBC 15, BUN/Crt 54/9, no electrolyte abnormalities. Troponin, BNP WNL. COVID negative. UA showed WBCs and bacteria. She was given 1L NS and Rocephin x1, trasnfered to [facility] for ARF. In ED, /75, afebrile, O2 100% on 3L. On my interview, she is alert and oriented x3, in no distress, somewhat lethargic. Answers all questions appropriately. Reports her SOB has since resolved and has no acute complaints. Repeat labs here show increase in WBC to 19, BUN/Crt 55/9, AST 71, . CXR shows increased interstitial markings but no obvious opacities. Renal US unremarkable. Medicine consulted for ARF.     Interval Hx 7/25/22: No acute events  overnight. Clear liquids tolerating well. Reports abdominal pain to nurse unrelieved with bentyl, but she denies any abdominal pain today. She has had good urine output, but has not been collected.      No current facility-administered medications on file prior to encounter.     Current Outpatient Medications on File Prior to Encounter   Medication Sig    gabapentin (NEURONTIN) 300 MG capsule Take 300 mg by mouth 2 (two) times a day.    nintedanib (OFEV) 150 mg Cap Ofev 150 mg capsule    tocilizumab (ACTEMRA) 162 mg/0.9 mL injection Inject 162 mg into the skin once a week.    albuterol (PROVENTIL) 2.5 mg /3 mL (0.083 %) nebulizer solution Take 3 mLs by nebulization every 8 (eight) hours as needed.    albuterol (PROVENTIL/VENTOLIN HFA) 90 mcg/actuation inhaler Inhale 2 puffs into the lungs every 6 (six) hours.    amLODIPine (NORVASC) 10 MG tablet Take 10 mg by mouth once daily.    budesonide-formoterol 160-4.5 mcg (SYMBICORT) 160-4.5 mcg/actuation HFAA Inhale 1 puff into the lungs daily as needed.    diclofenac sodium (VOLTAREN) 1 % Gel Apply 1 Tube topically every 6 (six) hours as needed.    ergocalciferol (ERGOCALCIFEROL) 50,000 unit Cap Take 1 capsule by mouth Daily.    ezetimibe (ZETIA) 10 mg tablet Take 10 mg by mouth every evening.    furosemide (LASIX) 40 MG tablet Take 40 mg by mouth Daily.    loratadine (CLARITIN) 10 mg tablet Take 10 mg by mouth once daily at 6am.    meloxicam (MOBIC) 15 MG tablet Take 15 mg by mouth once daily.    metoprolol tartrate (LOPRESSOR) 100 MG tablet Take 100 mg by mouth once daily.    omeprazole (PRILOSEC) 20 MG capsule Take 20 mg by mouth once daily.    ondansetron (ZOFRAN-ODT) 4 MG TbDL Take 4 mg by mouth every 6 (six) hours as needed.    predniSONE (DELTASONE) 10 MG tablet Take 10 mg by mouth Daily.     Family History    None       Tobacco Use    Smoking status: Not on file    Smokeless tobacco: Not on file   Substance and Sexual Activity    Alcohol use:  Not on file    Drug use: Not on file    Sexual activity: Not on file     ROS  Review of Systems   Constitutional: Negative for chills and fever.   HENT: Negative for congestion and sore throat.    Respiratory: Negative for shortness of breath and wheezing.    Cardiovascular: Negative for chest pain and leg swelling.   Gastrointestinal: Negative for abdominal pain, constipation, diarrhea, nausea and vomiting.   Genitourinary: Negative for dysuria.   Skin: Negative for rash.   Neurological: Negative for headaches.           Objective:     Vital Signs (Most Recent):  Temp: 97.4 °F (36.3 °C) (07/25/22 0740)  Pulse: 80 (07/25/22 0753)  Resp: 17 (07/25/22 0753)  BP: 131/85 (07/25/22 0740)  SpO2: 95 % (07/25/22 0753) Vital Signs (24h Range):  Temp:  [97.4 °F (36.3 °C)-98.8 °F (37.1 °C)] 97.4 °F (36.3 °C)  Pulse:  [] 80  Resp:  [16-20] 17  SpO2:  [95 %-100 %] 95 %  BP: (123-136)/(76-88) 131/85     Weight: 79.6 kg (175 lb 7.8 oz) (Simultaneous filing. User may not have seen previous data.)  Body mass index is 40.79 kg/m².    Physical Exam  Constitutional:       General: She is not in acute distress.     Appearance: She is obese. She is not toxic-appearing.   HENT:      Head: Atraumatic.      Nose: Nose normal. No congestion.      Comments: Nasal cannula in place     Mouth/Throat:      Pharynx: Oropharynx is clear. No oropharyngeal exudate.   Eyes:      Extraocular Movements: Extraocular movements intact.      Conjunctiva/sclera: Conjunctivae normal.      Pupils: Pupils are equal, round, and reactive to light.   Cardiovascular:      Rate and Rhythm: Normal rate and regular rhythm.      Pulses: Normal pulses.      Heart sounds: No murmur heard.    No gallop.   Pulmonary:      Effort: Pulmonary effort is normal. No respiratory distress.      Breath sounds: Normal breath sounds.   Abdominal:      General: Bowel sounds are normal. There is no distension.      Palpations: Abdomen is soft.      Tenderness: There is no  abdominal tenderness.   Musculoskeletal:         General: No deformity.      Cervical back: Neck supple.      Right lower leg: No edema.      Left lower leg: No edema.   Skin:     General: Skin is warm.      Capillary Refill: Capillary refill takes 2 to 3 seconds.      Findings: No rash.   Neurological:      General: No focal deficit present.      Mental Status: She is alert and oriented to person, place, and time.      Sensory: No sensory deficit.      Motor: No weakness.       Assessment/Plan:   ARF, improving  - BUN/Cr 5.6/2.02 from 7.2/3.16  - BUN/Cr 55.3/9.34 on admission  - AIN vs glomerulonephritis 2/2 to rheumatologic disease   - Continue LR at 100 ml/hr  - Pr/Cr elevated at 302  - Pending INES and SPEP   - C3 and C4 WNL, p-ANCA, c-ANCA WNL, urine Na WNL  - Dialysis cath in place, last session 7/22  - Nephrology consulted, appreciate recs  - Plan for kidney biopsy with IR AM 7/26, INR Pt. APTT ordered. NPO @ midnight    Leukocytosis, down trending   UTI   Hx of recurrent PNAs   - Received Rocephin at outside facility   - On initial presentation WBC increased to 19, now 12  - Restarted home Prednisone 10 mg on 7/23  - Holding immuno modulators  - Flagyl and Cefepime d/c on 7/22  - Blood and urine no growth at 72    Folate deficiency  -Folate 3.2  -Continue folic acid     Antisythetase syndrome  -C3 and C4 WNL, c-ANCA and p-ANCA negative  -Patient is on board for sending referral to Rheumatology to anyone in [place] that will accept her insurance. CM helping to find accepting providers     Hypokalemia  K 3.4 this AM  Will Give 40 KCl PO    Code: FULL code   DVT: Heparin   Lines: PIV   Abx: None  Fluids: LR at 100 ml/hr    Dispo: 46 y/o female admitted to Medicine for ARF. Hx of Antisythetase syndrome. On clear liquid diet. NPO at midnight for kidney biopsy on 7/26/22    Danny Rosario MD  HO-2, Department of Family Medicine

## 2022-07-25 NOTE — PROGRESS NOTES
"Nephrology Progress Note  Chief Complaint   Patient presents with    Abdominal Pain     Transfer from [facility] Course: Ms. Brady is a 47-year-old  female with past medical history of antisythetase syndrome, inflammatory arthritis, Raynaud's, and interstitial lung disease.  Patient was transferred from an outside facility on 07/19/2022 for higher level of care.  Patient presented to the ED with complaints of shortness of breath, decreased appetite, nausea, vomiting, and diarrhea that lasted for approximately 2-3 weeks.  BUN and creatinine were significantly elevated from baseline.  Patient has not improved with conservative measures (IV hydration), was initiated on hemodialysis on 07/20/2022.  We are continuing to follow for nephrology care.     Subjective  Patient denies complaints, reports feeling much better today, appetite has improved.    Review of Systems   Constitutional: Negative for fever.   Respiratory: Negative for cough and shortness of breath.    Cardiovascular: Negative for chest pain and leg swelling.       Objective  /85   Pulse 80   Temp 97.4 °F (36.3 °C) (Oral)   Resp 17   Ht 4' 7" (1.397 m)   Wt 79.6 kg (175 lb 7.8 oz) Comment: Simultaneous filing. User may not have seen previous data.  SpO2 95%   Breastfeeding No   BMI 40.79 kg/m²   No intake or output data in the 24 hours ending 07/25/22 1053    Physical Exam  General appearance: Patient is in no acute distress.  Skin: No rashes or wounds.  HEENT: PERRLA, EOMI, no scleral icterus, no JVD. Neck is supple.  Right IJ temporary dialysis catheter is in place.  Chest: Respirations are unlabored. Lungs sounds are clear.   Heart: S1, S2.   Abdomen: Benign.  : Deferred.  Extremities: No edema, peripheral pulses are palpable.   Neuro: No focal deficits.     Laboratory Data:   Recent Results (from the past 12 hour(s))   Comprehensive Metabolic Panel    Collection Time: 07/25/22  7:08 AM   Result Value " Ref Range    Sodium Level 144 136 - 145 mmol/L    Potassium Level 3.4 (L) 3.5 - 5.1 mmol/L    Chloride 107 98 - 107 mmol/L    Carbon Dioxide 28 22 - 29 mmol/L    Glucose Level 89 74 - 100 mg/dL    Blood Urea Nitrogen 5.6 (L) 7.0 - 18.7 mg/dL    Creatinine 2.02 (H) 0.55 - 1.02 mg/dL    Calcium Level Total 8.1 (L) 8.4 - 10.2 mg/dL    Protein Total 5.5 (L) 6.4 - 8.3 gm/dL    Albumin Level 2.7 (L) 3.5 - 5.0 gm/dL    Globulin 2.8 2.4 - 3.5 gm/dL    Albumin/Globulin Ratio 1.0 (L) 1.1 - 2.0 ratio    Bilirubin Total 0.3 <=1.5 mg/dL    Alkaline Phosphatase 61 40 - 150 unit/L    Alanine Aminotransferase 44 0 - 55 unit/L    Aspartate Aminotransferase 42 (H) 5 - 34 unit/L    Estimated GFR- 34 mls/min/1.73/m2   Magnesium    Collection Time: 07/25/22  7:08 AM   Result Value Ref Range    Magnesium Level 1.10 (L) 1.60 - 2.60 mg/dL   Phosphorus    Collection Time: 07/25/22  7:08 AM   Result Value Ref Range    Phosphorus Level 2.1 (L) 2.3 - 4.7 mg/dL   CBC with Differential    Collection Time: 07/25/22  7:08 AM   Result Value Ref Range    WBC 12.6 (H) 4.5 - 11.5 x10(3)/mcL    RBC 4.05 (L) 4.20 - 5.40 x10(6)/mcL    Hgb 10.8 (L) 12.0 - 16.0 gm/dL    Hct 34.6 (L) 37.0 - 47.0 %    MCV 85.4 80.0 - 94.0 fL    MCH 26.7 (L) 27.0 - 31.0 pg    MCHC 31.2 (L) 33.0 - 36.0 mg/dL    RDW 15.9 11.5 - 17.0 %    Platelet 181 130 - 400 x10(3)/mcL    MPV 10.2 7.4 - 10.4 fL    Neut % 47.0 %    Lymph % 40.9 %    Mono % 9.1 %    Eos % 1.2 %    Basophil % 0.4 %    Lymph # 5.14 (H) 0.6 - 4.6 x10(3)/mcL    Neut # 5.9 2.1 - 9.2 x10(3)/mcL    Mono # 1.14 0.1 - 1.3 x10(3)/mcL    Eos # 0.15 0 - 0.9 x10(3)/mcL    Baso # 0.05 0 - 0.2 x10(3)/mcL    IG# 0.17 (H) 0 - 0.04 x10(3)/mcL    IG% 1.4 %    NRBC% 0.0 %   Protime-INR    Collection Time: 07/25/22  7:44 AM   Result Value Ref Range    PT 12.8 seconds    INR 0.98 0.00 - 1.30   APTT    Collection Time: 07/25/22  7:44 AM   Result Value Ref Range    PTT 32.0 <150.0 seconds        Medications    Current  Facility-Administered Medications:     acetaminophen tablet 1,000 mg, 1,000 mg, Oral, Q6H PRN, Molly Rey MD    albuterol-ipratropium 2.5 mg-0.5 mg/3 mL nebulizer solution 3 mL, 3 mL, Nebulization, Q6H PRN, Savage Kimble MD    dextrose 10% bolus 125 mL, 12.5 g, Intravenous, PRN, Kashmir Box MD    dextrose 10% bolus 250 mL, 25 g, Intravenous, PRN, Kashmir Box MD    dicyclomine capsule 10 mg, 10 mg, Oral, BID, Molly Rey MD, 10 mg at 07/25/22 0817    ergocalciferol capsule 50,000 Units, 50,000 Units, Oral, Q7 Days, Evie Richter MD, 50,000 Units at 07/24/22 1518    fluticasone furoate-vilanteroL 100-25 mcg/dose diskus inhaler 1 puff, 1 puff, Inhalation, Daily, 1 puff at 07/25/22 0753 **AND** MDI Daily, , , Daily, Molly Rey MD    folic acid tablet 1 mg, 1 mg, Oral, Daily, Molly Rey MD, 1 mg at 07/25/22 0817    glucagon (human recombinant) injection 1 mg, 1 mg, Intramuscular, PRN, Savage Kimble MD    glucose chewable tablet 16 g, 16 g, Oral, PRN, Savage Kimble MD    glucose chewable tablet 24 g, 24 g, Oral, PRN, Savage Kimble MD    hydrOXYzine pamoate capsule 25 mg, 25 mg, Oral, Nightly PRN, Hai Bills MD, 25 mg at 07/24/22 2051    lactated ringers infusion, , Intravenous, Continuous, Evie Richter MD, Last Rate: 100 mL/hr at 07/23/22 0832, New Bag at 07/23/22 0832    melatonin tablet 6 mg, 6 mg, Oral, Nightly PRN, Hai Bills MD, 6 mg at 07/20/22 2056    naloxone 0.4 mg/mL injection 0.02 mg, 0.02 mg, Intravenous, PRN, Savage Kimble MD    ondansetron injection 8 mg, 8 mg, Intravenous, Q8H PRN, Molly Rey MD, 8 mg at 07/22/22 0848    pantoprazole EC tablet 40 mg, 40 mg, Oral, Daily, Molly Rey MD, 40 mg at 07/25/22 0817    predniSONE tablet 10 mg, 10 mg, Oral, Daily, Molly Rey MD, 10 mg at 07/25/22 0817    sodium chloride 0.9%  flush 10 mL, 10 mL, Intravenous, Q12H PRN, Savage Kimble MD    Flushing PICC Protocol, , , Until Discontinued **AND** sodium chloride 0.9% flush 10 mL, 10 mL, Intravenous, Q6H **AND** sodium chloride 0.9% flush 10 mL, 10 mL, Intravenous, PRN, Keiko Stoo, DO     Imaging:   X-Ray Chest 1 View   Final Result      Central venous catheter terminates within the right atrium.         Electronically signed by: Red Kat   Date:    07/20/2022   Time:    14:40      X-Ray Knee 3 View Right   Final Result      Right knee degenerative osteoarthritic changes, unchanged.         Electronically signed by: Red Kat   Date:    07/20/2022   Time:    11:42      US Retroperitoneal Limited   Final Result      Normal sized, nonobstructed kidneys.         Electronically signed by: Prudence Chun   Date:    07/20/2022   Time:    10:34      X-Ray Chest 1 View   Final Result      No acute cardiopulmonary process.  Findings of chronic lung disease.         Electronically signed by: Gabriel Clarke   Date:    07/20/2022   Time:    06:32      US Previous   Final Result      Xray Previous   Final Result      IR Biopsy Kidney    (Results Pending)       Impression:   Acute kidney injury, resolving  Hyponatremia  Anemia of chronic disease  Leukocytosis  History of antisynthetase syndrome  Inflammatory arthritis  Interstitial lung disease  Hypovitaminosis D   Secondary hyperparathyroidism    Plan:   Evaluation so far revealed normal complement levels, SPEP without M spike, negative HIV negative hepatitis panel, undetectable NATANAEL 2R antibody titer, negative ds DNA antibody titer. INES and Sm Ab titer are pending.  Awaiting kidney biopsy to definitively differentiate between immune complex mediated GN and acute tubular necrosis, although patient's presentation favors the latter.  Renal indices have improved, patient has been dialyzed since Friday.  There are no acute indications hemodialysis today.  Continue supportive care.   Potassium has been repleted.

## 2022-07-25 NOTE — PROCEDURES
"Ofelia Brady is a 47 y.o. female patient.    Temp: 97.4 °F (36.3 °C) (07/25/22 0740)  Pulse: 80 (07/25/22 0753)  Resp: 17 (07/25/22 0753)  BP: 131/85 (07/25/22 0740)  SpO2: 95 % (07/25/22 0753)  Weight: 79.6 kg (175 lb 7.8 oz) (Simultaneous filing. User may not have seen previous data.) (07/22/22 0500)  Height: 4' 7" (139.7 cm) (07/20/22 1444)    PICC  Date/Time: 7/25/2022 9:14 AM  Performed by: Evan Brady RN  Consent Done: Yes  Indications: med administration  Anesthesia: local infiltration  Local anesthetic: lidocaine 1% without epinephrine  Anesthetic Total (mL): 4  Preparation: skin prepped with ChloraPrep  Skin prep agent dried: skin prep agent completely dried prior to procedure  Sterile barriers: all five maximum sterile barriers used - cap, mask, sterile gown, sterile gloves, and large sterile sheet  Hand hygiene: hand hygiene performed prior to central venous catheter insertion  Location details: left basilic  Catheter type: single lumen  Catheter size: 4 Fr  Catheter Length: 13cm    Ultrasound guidance: yes  Vessel Caliber: small and patent, compressibility normal  Needle advanced into vessel with real time Ultrasound guidance.  Guidewire confirmed in vessel.  Sterile sheath used.  Number of attempts: 1            Evan Brady RN  7/25/2022    "

## 2022-07-26 LAB
ALBUMIN SERPL-MCNC: 2.6 GM/DL (ref 3.5–5)
ALBUMIN/GLOB SERPL: 1 RATIO (ref 1.1–2)
ALP SERPL-CCNC: 63 UNIT/L (ref 40–150)
ALT SERPL-CCNC: 44 UNIT/L (ref 0–55)
AST SERPL-CCNC: 43 UNIT/L (ref 5–34)
BASOPHILS # BLD AUTO: 0.05 X10(3)/MCL (ref 0–0.2)
BASOPHILS NFR BLD AUTO: 0.4 %
BILIRUBIN DIRECT+TOT PNL SERPL-MCNC: 0.3 MG/DL
BUN SERPL-MCNC: 5.6 MG/DL (ref 7–18.7)
CALCIUM SERPL-MCNC: 7.7 MG/DL (ref 8.4–10.2)
CHLORIDE SERPL-SCNC: 107 MMOL/L (ref 98–107)
CO2 SERPL-SCNC: 27 MMOL/L (ref 22–29)
CREAT SERPL-MCNC: 1.57 MG/DL (ref 0.55–1.02)
CYCLIC CITRULLINATED PEPTIDE (CCP) (OLG): NEGATIVE
DSDNA ANTIBODY (OHS): NEGATIVE
EOSINOPHIL # BLD AUTO: 0.22 X10(3)/MCL (ref 0–0.9)
EOSINOPHIL NFR BLD AUTO: 1.9 %
ERYTHROCYTE [DISTWIDTH] IN BLOOD BY AUTOMATED COUNT: 16 % (ref 11.5–17)
GLOBULIN SER-MCNC: 2.7 GM/DL (ref 2.4–3.5)
GLUCOSE SERPL-MCNC: 79 MG/DL (ref 74–100)
HCT VFR BLD AUTO: 33.6 % (ref 37–47)
HGB BLD-MCNC: 10.5 GM/DL (ref 12–16)
IMM GRANULOCYTES # BLD AUTO: 0.16 X10(3)/MCL (ref 0–0.04)
IMM GRANULOCYTES NFR BLD AUTO: 1.3 %
LYMPHOCYTES # BLD AUTO: 5.02 X10(3)/MCL (ref 0.6–4.6)
LYMPHOCYTES NFR BLD AUTO: 42.3 %
MAGNESIUM SERPL-MCNC: 1.2 MG/DL (ref 1.6–2.6)
MAYO GENERIC ORDERABLE RESULT: NORMAL
MCH RBC QN AUTO: 26.6 PG (ref 27–31)
MCHC RBC AUTO-ENTMCNC: 31.3 MG/DL (ref 33–36)
MCV RBC AUTO: 85.3 FL (ref 80–94)
MONOCYTES # BLD AUTO: 0.97 X10(3)/MCL (ref 0.1–1.3)
MONOCYTES NFR BLD AUTO: 8.2 %
NEUTROPHILS # BLD AUTO: 5.5 X10(3)/MCL (ref 2.1–9.2)
NEUTROPHILS NFR BLD AUTO: 45.9 %
NRBC BLD AUTO-RTO: 0 %
PHOSPHATE SERPL-MCNC: 2.2 MG/DL (ref 2.3–4.7)
PLATELET # BLD AUTO: 187 X10(3)/MCL (ref 130–400)
PMV BLD AUTO: 10.9 FL (ref 7.4–10.4)
POTASSIUM SERPL-SCNC: 3.3 MMOL/L (ref 3.5–5.1)
PROT SERPL-MCNC: 5.3 GM/DL (ref 6.4–8.3)
RBC # BLD AUTO: 3.94 X10(6)/MCL (ref 4.2–5.4)
RHEUMATOID FACTOR IGA (OLG): NEGATIVE
RHEUMATOID FACTOR IGM (OLG): NEGATIVE
SODIUM SERPL-SCNC: 142 MMOL/L (ref 136–145)
WBC # SPEC AUTO: 11.9 X10(3)/MCL (ref 4.5–11.5)

## 2022-07-26 PROCEDURE — 63600175 PHARM REV CODE 636 W HCPCS: Performed by: RADIOLOGY

## 2022-07-26 PROCEDURE — 25000003 PHARM REV CODE 250: Performed by: NURSE PRACTITIONER

## 2022-07-26 PROCEDURE — 25000003 PHARM REV CODE 250: Performed by: STUDENT IN AN ORGANIZED HEALTH CARE EDUCATION/TRAINING PROGRAM

## 2022-07-26 PROCEDURE — 25000003 PHARM REV CODE 250: Performed by: INTERNAL MEDICINE

## 2022-07-26 PROCEDURE — 94640 AIRWAY INHALATION TREATMENT: CPT

## 2022-07-26 PROCEDURE — 94761 N-INVAS EAR/PLS OXIMETRY MLT: CPT

## 2022-07-26 PROCEDURE — 99900035 HC TECH TIME PER 15 MIN (STAT)

## 2022-07-26 PROCEDURE — 94760 N-INVAS EAR/PLS OXIMETRY 1: CPT

## 2022-07-26 PROCEDURE — 88313 SPECIAL STAINS GROUP 2: CPT | Performed by: STUDENT IN AN ORGANIZED HEALTH CARE EDUCATION/TRAINING PROGRAM

## 2022-07-26 PROCEDURE — 11000001 HC ACUTE MED/SURG PRIVATE ROOM

## 2022-07-26 PROCEDURE — 88350 IMFLUOR EA ADDL 1ANTB STN PX: CPT | Performed by: STUDENT IN AN ORGANIZED HEALTH CARE EDUCATION/TRAINING PROGRAM

## 2022-07-26 PROCEDURE — 36415 COLL VENOUS BLD VENIPUNCTURE: CPT | Performed by: STUDENT IN AN ORGANIZED HEALTH CARE EDUCATION/TRAINING PROGRAM

## 2022-07-26 PROCEDURE — 63600175 PHARM REV CODE 636 W HCPCS: Performed by: STUDENT IN AN ORGANIZED HEALTH CARE EDUCATION/TRAINING PROGRAM

## 2022-07-26 PROCEDURE — 80053 COMPREHEN METABOLIC PANEL: CPT | Performed by: STUDENT IN AN ORGANIZED HEALTH CARE EDUCATION/TRAINING PROGRAM

## 2022-07-26 PROCEDURE — 85025 COMPLETE CBC W/AUTO DIFF WBC: CPT | Performed by: STUDENT IN AN ORGANIZED HEALTH CARE EDUCATION/TRAINING PROGRAM

## 2022-07-26 PROCEDURE — 63600175 PHARM REV CODE 636 W HCPCS: Performed by: INTERNAL MEDICINE

## 2022-07-26 PROCEDURE — 88346 IMFLUOR 1ST 1ANTB STAIN PX: CPT | Performed by: STUDENT IN AN ORGANIZED HEALTH CARE EDUCATION/TRAINING PROGRAM

## 2022-07-26 PROCEDURE — 84100 ASSAY OF PHOSPHORUS: CPT | Performed by: STUDENT IN AN ORGANIZED HEALTH CARE EDUCATION/TRAINING PROGRAM

## 2022-07-26 PROCEDURE — 83735 ASSAY OF MAGNESIUM: CPT | Performed by: STUDENT IN AN ORGANIZED HEALTH CARE EDUCATION/TRAINING PROGRAM

## 2022-07-26 PROCEDURE — 27000221 HC OXYGEN, UP TO 24 HOURS

## 2022-07-26 PROCEDURE — A4216 STERILE WATER/SALINE, 10 ML: HCPCS | Performed by: STUDENT IN AN ORGANIZED HEALTH CARE EDUCATION/TRAINING PROGRAM

## 2022-07-26 PROCEDURE — 30000890 HC MISC. SEND OUT TEST: Performed by: STUDENT IN AN ORGANIZED HEALTH CARE EDUCATION/TRAINING PROGRAM

## 2022-07-26 RX ORDER — SODIUM,POTASSIUM PHOSPHATES 280-250MG
1 POWDER IN PACKET (EA) ORAL ONCE
Status: COMPLETED | OUTPATIENT
Start: 2022-07-26 | End: 2022-07-26

## 2022-07-26 RX ORDER — SODIUM,POTASSIUM PHOSPHATES 280-250MG
1 POWDER IN PACKET (EA) ORAL ONCE
Status: DISCONTINUED | OUTPATIENT
Start: 2022-07-26 | End: 2022-07-26

## 2022-07-26 RX ORDER — SODIUM CHLORIDE 9 MG/ML
INJECTION, SOLUTION INTRAVENOUS CONTINUOUS
Status: DISCONTINUED | OUTPATIENT
Start: 2022-07-26 | End: 2022-07-27

## 2022-07-26 RX ORDER — MAGNESIUM SULFATE HEPTAHYDRATE 40 MG/ML
2 INJECTION, SOLUTION INTRAVENOUS ONCE
Status: COMPLETED | OUTPATIENT
Start: 2022-07-26 | End: 2022-07-26

## 2022-07-26 RX ORDER — MIDAZOLAM HYDROCHLORIDE 1 MG/ML
INJECTION INTRAMUSCULAR; INTRAVENOUS CODE/TRAUMA/SEDATION MEDICATION
Status: COMPLETED | OUTPATIENT
Start: 2022-07-26 | End: 2022-07-26

## 2022-07-26 RX ORDER — POTASSIUM CHLORIDE 20 MEQ/1
20 TABLET, EXTENDED RELEASE ORAL 2 TIMES DAILY
Status: COMPLETED | OUTPATIENT
Start: 2022-07-26 | End: 2022-07-26

## 2022-07-26 RX ORDER — SODIUM CHLORIDE, SODIUM LACTATE, POTASSIUM CHLORIDE, CALCIUM CHLORIDE 600; 310; 30; 20 MG/100ML; MG/100ML; MG/100ML; MG/100ML
INJECTION, SOLUTION INTRAVENOUS CONTINUOUS
Status: DISCONTINUED | OUTPATIENT
Start: 2022-07-26 | End: 2022-07-26

## 2022-07-26 RX ORDER — POTASSIUM CHLORIDE 20 MEQ/1
20 TABLET, EXTENDED RELEASE ORAL DAILY
Status: DISCONTINUED | OUTPATIENT
Start: 2022-07-27 | End: 2022-07-28 | Stop reason: HOSPADM

## 2022-07-26 RX ORDER — FENTANYL CITRATE 50 UG/ML
INJECTION, SOLUTION INTRAMUSCULAR; INTRAVENOUS CODE/TRAUMA/SEDATION MEDICATION
Status: COMPLETED | OUTPATIENT
Start: 2022-07-26 | End: 2022-07-26

## 2022-07-26 RX ADMIN — MIDAZOLAM 0.5 MG: 1 INJECTION INTRAMUSCULAR; INTRAVENOUS at 08:07

## 2022-07-26 RX ADMIN — POTASSIUM, SODIUM PHOSPHATES 280 MG-160 MG-250 MG ORAL POWDER PACKET 1 PACKET: POWDER IN PACKET at 08:07

## 2022-07-26 RX ADMIN — SODIUM CHLORIDE, PRESERVATIVE FREE 10 ML: 5 INJECTION INTRAVENOUS at 06:07

## 2022-07-26 RX ADMIN — SODIUM CHLORIDE, POTASSIUM CHLORIDE, SODIUM LACTATE AND CALCIUM CHLORIDE: 600; 310; 30; 20 INJECTION, SOLUTION INTRAVENOUS at 09:07

## 2022-07-26 RX ADMIN — POTASSIUM CHLORIDE 20 MEQ: 20 TABLET, EXTENDED RELEASE ORAL at 09:07

## 2022-07-26 RX ADMIN — SODIUM CHLORIDE: 9 INJECTION, SOLUTION INTRAVENOUS at 09:07

## 2022-07-26 RX ADMIN — SODIUM CHLORIDE, PRESERVATIVE FREE 10 ML: 5 INJECTION INTRAVENOUS at 05:07

## 2022-07-26 RX ADMIN — FLUTICASONE FUROATE AND VILANTEROL TRIFENATATE 1 PUFF: 100; 25 POWDER RESPIRATORY (INHALATION) at 01:07

## 2022-07-26 RX ADMIN — SODIUM CHLORIDE, PRESERVATIVE FREE 10 ML: 5 INJECTION INTRAVENOUS at 12:07

## 2022-07-26 RX ADMIN — PANTOPRAZOLE SODIUM 40 MG: 40 TABLET, DELAYED RELEASE ORAL at 09:07

## 2022-07-26 RX ADMIN — SODIUM CHLORIDE, POTASSIUM CHLORIDE, SODIUM LACTATE AND CALCIUM CHLORIDE: 600; 310; 30; 20 INJECTION, SOLUTION INTRAVENOUS at 05:07

## 2022-07-26 RX ADMIN — PREDNISONE 10 MG: 10 TABLET ORAL at 09:07

## 2022-07-26 RX ADMIN — FENTANYL CITRATE 25 MCG: 50 INJECTION INTRAMUSCULAR; INTRAVENOUS at 07:07

## 2022-07-26 RX ADMIN — FOLIC ACID 1 MG: 1 TABLET ORAL at 09:07

## 2022-07-26 RX ADMIN — SODIUM CHLORIDE: 9 INJECTION, SOLUTION INTRAVENOUS at 08:07

## 2022-07-26 RX ADMIN — MAGNESIUM SULFATE 2 G: 2 INJECTION INTRAVENOUS at 09:07

## 2022-07-26 RX ADMIN — DICYCLOMINE HYDROCHLORIDE 10 MG: 10 CAPSULE ORAL at 09:07

## 2022-07-26 RX ADMIN — DICYCLOMINE HYDROCHLORIDE 10 MG: 10 CAPSULE ORAL at 08:07

## 2022-07-26 RX ADMIN — POTASSIUM CHLORIDE 20 MEQ: 20 TABLET, EXTENDED RELEASE ORAL at 08:07

## 2022-07-26 NOTE — PT/OT/SLP PROGRESS
Physical Therapy  Missed Treatment Note    Patient Name:  Ofelia Brady   MRN:  [id number]    Patient not seen today secondary to Bedrest, Nurse/ JOSEPHINE hold, Other (Comment) (nurse davi reported pt was on bedrest due to renal biopsy today.). Will follow-up as scheduled.

## 2022-07-26 NOTE — PLAN OF CARE
Problem: Adult Inpatient Plan of Care  Goal: Plan of Care Review  Outcome: Ongoing, Progressing  Goal: Patient-Specific Goal (Individualized)  Outcome: Ongoing, Progressing  Goal: Absence of Hospital-Acquired Illness or Injury  Outcome: Ongoing, Progressing  Goal: Optimal Comfort and Wellbeing  Outcome: Ongoing, Progressing  Goal: Readiness for Transition of Care  Outcome: Ongoing, Progressing     Problem: Fluid and Electrolyte Imbalance (Acute Kidney Injury/Impairment)  Goal: Fluid and Electrolyte Balance  Outcome: Ongoing, Progressing     Problem: Oral Intake Inadequate (Acute Kidney Injury/Impairment)  Goal: Optimal Nutrition Intake  Outcome: Ongoing, Progressing     Problem: Renal Function Impairment (Acute Kidney Injury/Impairment)  Goal: Effective Renal Function  Outcome: Ongoing, Progressing     Problem: Skin Injury Risk Increased  Goal: Skin Health and Integrity  Outcome: Ongoing, Progressing     Problem: Infection  Goal: Absence of Infection Signs and Symptoms  Outcome: Ongoing, Progressing

## 2022-07-26 NOTE — PROGRESS NOTES
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 Calderon. Presents to ED as a transfer from [place] for ARF. She reports she began experiencing SOB around 5AM. Denies any f/c, cough, orthopnea, leg swelling. She has some SOB at baseline, uses 3L home O2 but reports this was worse than usual. She has a hx of recurrent PNA requiring hospitalization. She also reports 2-3 week histroy of generalized fatigue, n/v, and diarrhea. Having around 2 episodes of emesis per day, associated w/ meals, difficulty tolerating PO intake, also having multiple episodes of loose watery stools daily. Denies any blood in the vomit/stool. Denies chest pain, abdominal pain, dysuria. Reports she is urinating less often and smaller volume. She has not seen Dr. Calderon since last December however she reports compliance w/ her Actemra and Ofev. No longer taking prednisone daily since that time. She denies any recent sick contacts. Last hospitalized 1 month ago w/ PNA.     At outside facility, work up showed elevated WBC 15, BUN/Crt 54/9, no electrolyte abnormalities. Troponin, BNP WNL. COVID negative. UA showed WBCs and bacteria. She was given 1L NS and Rocephin x1, trasnfered to [facility] for ARF. In ED, /75, afebrile, O2 100% on 3L. On my interview, she is alert and oriented x3, in no distress, somewhat lethargic. Answers all questions appropriately. Reports her SOB has since resolved and has no acute complaints. Repeat labs here show increase in WBC to 19, BUN/Crt 55/9, AST 71, . CXR shows increased interstitial markings but no obvious opacities. Renal US unremarkable. Medicine consulted for ARF.     Interval Hx 7/25/22: No acute events  overnight. Clear liquids tolerating well. Reports abdominal pain to nurse unrelieved with bentyl, but she denies any abdominal pain today. She has had good urine output, but has not been collected.      No current facility-administered medications on file prior to encounter.     Current Outpatient Medications on File Prior to Encounter   Medication Sig    gabapentin (NEURONTIN) 300 MG capsule Take 300 mg by mouth 2 (two) times a day.    nintedanib (OFEV) 150 mg Cap Ofev 150 mg capsule    tocilizumab (ACTEMRA) 162 mg/0.9 mL injection Inject 162 mg into the skin once a week.    albuterol (PROVENTIL) 2.5 mg /3 mL (0.083 %) nebulizer solution Take 3 mLs by nebulization every 8 (eight) hours as needed.    albuterol (PROVENTIL/VENTOLIN HFA) 90 mcg/actuation inhaler Inhale 2 puffs into the lungs every 6 (six) hours.    amLODIPine (NORVASC) 10 MG tablet Take 10 mg by mouth once daily.    budesonide-formoterol 160-4.5 mcg (SYMBICORT) 160-4.5 mcg/actuation HFAA Inhale 1 puff into the lungs daily as needed.    diclofenac sodium (VOLTAREN) 1 % Gel Apply 1 Tube topically every 6 (six) hours as needed.    ergocalciferol (ERGOCALCIFEROL) 50,000 unit Cap Take 1 capsule by mouth Daily.    ezetimibe (ZETIA) 10 mg tablet Take 10 mg by mouth every evening.    furosemide (LASIX) 40 MG tablet Take 40 mg by mouth Daily.    loratadine (CLARITIN) 10 mg tablet Take 10 mg by mouth once daily at 6am.    meloxicam (MOBIC) 15 MG tablet Take 15 mg by mouth once daily.    metoprolol tartrate (LOPRESSOR) 100 MG tablet Take 100 mg by mouth once daily.    omeprazole (PRILOSEC) 20 MG capsule Take 20 mg by mouth once daily.    ondansetron (ZOFRAN-ODT) 4 MG TbDL Take 4 mg by mouth every 6 (six) hours as needed.    predniSONE (DELTASONE) 10 MG tablet Take 10 mg by mouth Daily.     Family History    None       Tobacco Use    Smoking status: Not on file    Smokeless tobacco: Not on file   Substance and Sexual Activity    Alcohol use:  Not on file    Drug use: Not on file    Sexual activity: Not on file     ROS  Review of Systems   Constitutional: Negative for chills and fever.   HENT: Negative for congestion and sore throat.    Respiratory: Negative for shortness of breath and wheezing.    Cardiovascular: Negative for chest pain and leg swelling.   Gastrointestinal: Negative for abdominal pain, constipation, diarrhea, nausea and vomiting.   Genitourinary: Negative for dysuria.   Skin: Negative for rash.   Neurological: Negative for headaches.           Objective:     Vital Signs (Most Recent):  Temp: 97.5 °F (36.4 °C) (07/26/22 1451)  Pulse: 103 (07/26/22 1451)  Resp: 15 (07/26/22 1357)  BP: 114/77 (07/26/22 1451)  SpO2: 100 % (07/26/22 1451) Vital Signs (24h Range):  Temp:  [97.5 °F (36.4 °C)-98.5 °F (36.9 °C)] 97.5 °F (36.4 °C)  Pulse:  [] 103  Resp:  [15-20] 15  SpO2:  [100 %] 100 %  BP: (109-147)/(76-97) 114/77     Weight: 79.6 kg (175 lb 7.8 oz)  Body mass index is 40.79 kg/m².    Physical Exam  Constitutional:       General: She is not in acute distress.     Appearance: She is obese. She is not toxic-appearing.   HENT:      Head: Atraumatic.      Nose: Nose normal. No congestion.      Comments: Nasal cannula in place     Mouth/Throat:      Pharynx: Oropharynx is clear. No oropharyngeal exudate.   Eyes:      Extraocular Movements: Extraocular movements intact.      Conjunctiva/sclera: Conjunctivae normal.      Pupils: Pupils are equal, round, and reactive to light.   Cardiovascular:      Rate and Rhythm: Normal rate and regular rhythm.      Pulses: Normal pulses.      Heart sounds: No murmur heard.    No gallop.   Pulmonary:      Effort: Pulmonary effort is normal. No respiratory distress.      Breath sounds: Normal breath sounds.   Abdominal:      General: Bowel sounds are normal. There is no distension.      Palpations: Abdomen is soft.      Tenderness: There is no abdominal tenderness.   Musculoskeletal:         General: No  deformity.      Cervical back: Neck supple.      Right lower leg: No edema.      Left lower leg: No edema.   Skin:     General: Skin is warm.      Capillary Refill: Capillary refill takes 2 to 3 seconds.      Findings: No rash.   Neurological:      General: No focal deficit present.      Mental Status: She is alert and oriented to person, place, and time.      Sensory: No sensory deficit.      Motor: No weakness.       Assessment/Plan:   ARF, improving  - BUN/Cr continuing to improve  - BUN/Cr 55.3/9.34 on admission  - AIN vs glomerulonephritis 2/2 to rheumatologic disease   - Continue LR at 100 ml/hr  - Pr/Cr elevated at 302  - Pending INES  - SPEP no evidence of gammopathy   - C3 and C4 WNL, p-ANCA, c-ANCA WNL, urine Na WNL  - Dialysis cath in place, last session 7/22  - Nephrology consulted, appreciate recs  - Kidney biopsy this AM, tolerated well    Leukocytosis, down trending   UTI   Hx of recurrent PNAs   - WBC continues to downtrend 11.9 from 12.6 yesterday  - Continue home Prednisone 10 mg on 7/23  - Holding immuno modulators    Folate deficiency  -Folate 3.2  -Continue folic acid     Antisythetase syndrome  -C3 and C4 WNL, c-ANCA and p-ANCA negative  -Patient is on board for sending referral to Rheumatology to anyone in [place] that will accept her insurance. CM helping to find accepting providers     Hypokalemia  Hypophosphatemia  Hypomagnesemia  K 3.3 this AM  2 g Mg IV given  Will Give 40 KCl plus K-phos abel x 1   Continue to trend CMP, Mg, Phos.  Replete as necessary    Code: FULL code   DVT: Heparin   Lines: PIV   Abx: None  Fluids: NaCl at 100 ml/hr    Dispo: 46 y/o female admitted to Medicine for ARF. Hx of Antisythetase syndrome. Kidney biopsy today. Replete electrolytes    Danny Rosario MD  HO-2, Department of Family Medicine

## 2022-07-26 NOTE — SEDATION DOCUMENTATION
Sedation note correction: during charting Versed 0.5 mg charted wrong time given. Versed was given at 7:07 am in CT for patient's kidney biopsy.

## 2022-07-26 NOTE — PROGRESS NOTES
"Nephrology Progress Note    Hospital course:   47-year-old  female with antisynthetase syndrome.  Patient presents to the emergency department in [place] complaining of nausea vomiting and diarrhea.  Patient was transferred from [place] due to acute kidney injury.  Patient did not recover renal function after almost 3 L of IV fluid resuscitation.  Patient required hemodialysis for clearance purposes.  Patient did not have dialysis over the weekend and creatinine was 2 yesterday today is 1.56.  Patient does appear to be recovering renal function.  And will not require long-term dialysis.  She is scheduled for kidney biopsy today.    Subjective:   Rounded this morning at 6:30 a.m. and patient was resting comfortably with no complaints.  Appetite is improving.  Although she has been NPO for kidney biopsy.    Objective:   /86 (BP Location: Right arm, Patient Position: Lying)   Pulse 95   Temp 98 °F (36.7 °C) (Oral)   Resp 17   Ht 4' 7" (1.397 m)   Wt 79.6 kg (175 lb 7.8 oz)   SpO2 100%   Breastfeeding No   BMI 40.79 kg/m²     Intake/Output Summary (Last 24 hours) at 7/26/2022 0909  Last data filed at 7/25/2022 1444  Gross per 24 hour   Intake 1980 ml   Output --   Net 1980 ml        Physical exam:   Vitals noted  General:  Patient is alert oriented person place time no acute distress  HEENT:  Normocephalic atraumatic pupils equal round reactive to light  Mouth:  No oral ulcers or lesions noted  Neck:  no JVD bruit thyromegaly adenopathy appreciated  Lungs:  Clear to auscultation bilaterally all fields  Cardiovascular regular rate and rhythm no murmur rub gallop appreciated  Abdomen:  Positive bowel sounds all 4 quadrants soft nontender nondistended  Extremities:  No clubbing cyanosis or edema noted  Neurologic no clonus asterixis appreciated chronicity 12 grossly intact.    Laboratory data:   Recent Results (from the past 24 hour(s))   Comprehensive Metabolic Panel    Collection Time: " 07/26/22  4:27 AM   Result Value Ref Range    Sodium Level 142 136 - 145 mmol/L    Potassium Level 3.3 (L) 3.5 - 5.1 mmol/L    Chloride 107 98 - 107 mmol/L    Carbon Dioxide 27 22 - 29 mmol/L    Glucose Level 79 74 - 100 mg/dL    Blood Urea Nitrogen 5.6 (L) 7.0 - 18.7 mg/dL    Creatinine 1.57 (H) 0.55 - 1.02 mg/dL    Calcium Level Total 7.7 (L) 8.4 - 10.2 mg/dL    Protein Total 5.3 (L) 6.4 - 8.3 gm/dL    Albumin Level 2.6 (L) 3.5 - 5.0 gm/dL    Globulin 2.7 2.4 - 3.5 gm/dL    Albumin/Globulin Ratio 1.0 (L) 1.1 - 2.0 ratio    Bilirubin Total 0.3 <=1.5 mg/dL    Alkaline Phosphatase 63 40 - 150 unit/L    Alanine Aminotransferase 44 0 - 55 unit/L    Aspartate Aminotransferase 43 (H) 5 - 34 unit/L    Estimated GFR- 45 mls/min/1.73/m2   Magnesium    Collection Time: 07/26/22  4:27 AM   Result Value Ref Range    Magnesium Level 1.20 (L) 1.60 - 2.60 mg/dL   Phosphorus    Collection Time: 07/26/22  4:27 AM   Result Value Ref Range    Phosphorus Level 2.2 (L) 2.3 - 4.7 mg/dL   CBC with Differential    Collection Time: 07/26/22  4:27 AM   Result Value Ref Range    WBC 11.9 (H) 4.5 - 11.5 x10(3)/mcL    RBC 3.94 (L) 4.20 - 5.40 x10(6)/mcL    Hgb 10.5 (L) 12.0 - 16.0 gm/dL    Hct 33.6 (L) 37.0 - 47.0 %    MCV 85.3 80.0 - 94.0 fL    MCH 26.6 (L) 27.0 - 31.0 pg    MCHC 31.3 (L) 33.0 - 36.0 mg/dL    RDW 16.0 11.5 - 17.0 %    Platelet 187 130 - 400 x10(3)/mcL    MPV 10.9 (H) 7.4 - 10.4 fL    Neut % 45.9 %    Lymph % 42.3 %    Mono % 8.2 %    Eos % 1.9 %    Basophil % 0.4 %    Lymph # 5.02 (H) 0.6 - 4.6 x10(3)/mcL    Neut # 5.5 2.1 - 9.2 x10(3)/mcL    Mono # 0.97 0.1 - 1.3 x10(3)/mcL    Eos # 0.22 0 - 0.9 x10(3)/mcL    Baso # 0.05 0 - 0.2 x10(3)/mcL    IG# 0.16 (H) 0 - 0.04 x10(3)/mcL    IG% 1.3 %    NRBC% 0.0 %       Imaging:   Imaging Results          X-Ray Chest 1 View (Final result)  Result time 07/20/22 06:32:04   Procedure changed from X-Ray Chest PA And Lateral     Final result by Gabriel Clarke MD (07/20/22  06:32:04)                 Impression:      No acute cardiopulmonary process.  Findings of chronic lung disease.      Electronically signed by: Gabriel Vince  Date:    07/20/2022  Time:    06:32             Narrative:    EXAMINATION:  XR CHEST 1 VIEW    CLINICAL HISTORY:  Shortness of breath;SOB;    TECHNIQUE:  Single view of the chest    COMPARISON:  07/19/2022    FINDINGS:  Prominent interstitial markings with no focal opacification.    The cardiomediastinal silhouette is within normal limits.    No acute osseous abnormality.                                 Medications:     Current Facility-Administered Medications:     acetaminophen tablet 1,000 mg, 1,000 mg, Oral, Q6H PRN, Molly Rey MD    albuterol-ipratropium 2.5 mg-0.5 mg/3 mL nebulizer solution 3 mL, 3 mL, Nebulization, Q6H PRN, Savage Kimble MD    dextrose 10% bolus 125 mL, 12.5 g, Intravenous, PRN, Kashmir Box MD    dextrose 10% bolus 250 mL, 25 g, Intravenous, PRN, Kashmir Box MD    dicyclomine capsule 10 mg, 10 mg, Oral, BID, Molly Rey MD, 10 mg at 07/26/22 0901    ergocalciferol capsule 50,000 Units, 50,000 Units, Oral, Q7 Days, Evie Richter MD, 50,000 Units at 07/24/22 1518    fluticasone furoate-vilanteroL 100-25 mcg/dose diskus inhaler 1 puff, 1 puff, Inhalation, Daily, 1 puff at 07/25/22 0753 **AND** MDI Daily, , , Daily, Molly Rey MD    folic acid tablet 1 mg, 1 mg, Oral, Daily, Molly Rey MD, 1 mg at 07/26/22 0902    glucagon (human recombinant) injection 1 mg, 1 mg, Intramuscular, PRN, Savage Kimble MD    glucose chewable tablet 16 g, 16 g, Oral, PRN, Savage Kimble MD    glucose chewable tablet 24 g, 24 g, Oral, PRN, Savage Kimble MD    hydrOXYzine pamoate capsule 25 mg, 25 mg, Oral, Nightly PRN, Hai Bills MD, 25 mg at 07/24/22 2051    lactated ringers infusion, , Intravenous, Continuous, Evie Richter MD,  Last Rate: 100 mL/hr at 07/26/22 0902, New Bag at 07/26/22 0902    magnesium sulfate 2g in water 50mL IVPB (premix), 2 g, Intravenous, Once, Evie Richter MD, Last Rate: 25 mL/hr at 07/26/22 0901, 2 g at 07/26/22 0901    melatonin tablet 6 mg, 6 mg, Oral, Nightly PRN, Hai Bills MD, 6 mg at 07/20/22 2056    naloxone 0.4 mg/mL injection 0.02 mg, 0.02 mg, Intravenous, PRN, Savage Kimble MD    ondansetron injection 8 mg, 8 mg, Intravenous, Q8H PRN, Molly Rey MD, 8 mg at 07/22/22 0848    pantoprazole EC tablet 40 mg, 40 mg, Oral, Daily, Molly Rey MD, 40 mg at 07/26/22 0902    potassium chloride SA CR tablet 20 mEq, 20 mEq, Oral, BID, CHERYL Reagan, 20 mEq at 07/26/22 0902    [START ON 7/27/2022] potassium chloride SA CR tablet 20 mEq, 20 mEq, Oral, Daily, MANJEET ReaganP    predniSONE tablet 10 mg, 10 mg, Oral, Daily, Molly Rey MD, 10 mg at 07/26/22 0902    sodium chloride 0.9% flush 10 mL, 10 mL, Intravenous, Q12H PRN, Savage Kimble MD    Flushing PICC Protocol, , , Until Discontinued **AND** sodium chloride 0.9% flush 10 mL, 10 mL, Intravenous, Q6H, 10 mL at 07/26/22 0504 **AND** sodium chloride 0.9% flush 10 mL, 10 mL, Intravenous, PRN, Keiko Soto,      Impression/Plan:  1. Acute nonoliguric renal failure.  Unclear with oliguric versus nonoliguric and urine output is not being recorded.  Although patient does relate she is urinating.  Renal function is continuing to improve without dialysis.  Patient is demonstrating renal function recovery room would not likely need long-term dialysis.  Will recheck CMP in a.m..  Patient to have kidney biopsy.  Etiologies for acute renal failure likely ischemic acute tubular necrosis due to hypotension and hypoperfusion.  There are other etiologies to consider with antisynthetase syndrome to include acute renal failure due to renal vascular damage due to edematous thickening of intima of  arteries and immune complex glomerulonephritis is also been noted.  Patient did have a very bland urine sediment.    2. Hypomagnesemia:  Replete with 2 g magnesium sulfate IV x1 will recheck level.    3. Hypokalemia and hypophosphatemia:  Patient has received potassium repletion.  Consider K-Phos neutral p.o. x1 after NPO status is lifted.    4. Metabolic alkalosis:  Place patient on normal saline at 100 mL/hr.    5. Vitamin-D deficiency:  Continue repletion.    6. Proteinuria on 07/20/2022:  Serum protein electrophoresis negative gammopathy.  Patient is to have kidney biopsy today.    Evie Richter M.D.  Nephrology

## 2022-07-27 LAB
ABS NEUT CALC (OHS): 10.73 X10(3)/MCL (ref 2.1–9.2)
ALBUMIN SERPL-MCNC: 2.6 GM/DL (ref 3.5–5)
ALBUMIN/GLOB SERPL: 1 RATIO (ref 1.1–2)
ALP SERPL-CCNC: 54 UNIT/L (ref 40–150)
ALT SERPL-CCNC: 41 UNIT/L (ref 0–55)
ANION GAP SERPL CALC-SCNC: 8 MEQ/L
ANISOCYTOSIS BLD QL SMEAR: ABNORMAL
AST SERPL-CCNC: 36 UNIT/L (ref 5–34)
BASOPHILS NFR BLD MANUAL: 0.14 X10(3)/MCL (ref 0–0.2)
BASOPHILS NFR BLD MANUAL: 1 % (ref 0–2)
BILIRUBIN DIRECT+TOT PNL SERPL-MCNC: 0.3 MG/DL
BUN SERPL-MCNC: 5.9 MG/DL (ref 7–18.7)
BUN SERPL-MCNC: 6.1 MG/DL (ref 7–18.7)
CALCIUM SERPL-MCNC: 7.4 MG/DL (ref 8.4–10.2)
CALCIUM SERPL-MCNC: 7.7 MG/DL (ref 8.4–10.2)
CHLORIDE SERPL-SCNC: 109 MMOL/L (ref 98–107)
CHLORIDE SERPL-SCNC: 111 MMOL/L (ref 98–107)
CO2 SERPL-SCNC: 28 MMOL/L (ref 22–29)
CO2 SERPL-SCNC: 28 MMOL/L (ref 22–29)
CREAT SERPL-MCNC: 1.25 MG/DL (ref 0.55–1.02)
CREAT SERPL-MCNC: 1.3 MG/DL (ref 0.55–1.02)
CREAT/UREA NIT SERPL: 5
EOSINOPHIL NFR BLD MANUAL: 0.29 X10(3)/MCL (ref 0–0.9)
EOSINOPHIL NFR BLD MANUAL: 2 % (ref 0–8)
ERYTHROCYTE [DISTWIDTH] IN BLOOD BY AUTOMATED COUNT: 16.1 % (ref 11.5–17)
GLOBULIN SER-MCNC: 2.6 GM/DL (ref 2.4–3.5)
GLUCOSE SERPL-MCNC: 106 MG/DL (ref 74–100)
GLUCOSE SERPL-MCNC: 81 MG/DL (ref 74–100)
HCT VFR BLD AUTO: 32.1 % (ref 37–47)
HGB BLD-MCNC: 10.2 GM/DL (ref 12–16)
IMM GRANULOCYTES # BLD AUTO: 0.16 X10(3)/MCL (ref 0–0.04)
IMM GRANULOCYTES NFR BLD AUTO: 1.1 %
LYMPHOCYTES NFR BLD MANUAL: 22 % (ref 13–40)
LYMPHOCYTES NFR BLD MANUAL: 3.19 X10(3)/MCL
MAGNESIUM SERPL-MCNC: 1.3 MG/DL (ref 1.6–2.6)
MAGNESIUM SERPL-MCNC: 2.4 MG/DL (ref 1.6–2.6)
MCH RBC QN AUTO: 27.2 PG (ref 27–31)
MCHC RBC AUTO-ENTMCNC: 31.8 MG/DL (ref 33–36)
MCV RBC AUTO: 85.6 FL (ref 80–94)
MONOCYTES NFR BLD MANUAL: 0.14 X10(3)/MCL (ref 0.1–1.3)
MONOCYTES NFR BLD MANUAL: 1 % (ref 2–11)
NEUTROPHILS NFR BLD MANUAL: 74 % (ref 47–80)
NRBC BLD AUTO-RTO: 0 %
PHOSPHATE SERPL-MCNC: 1.5 MG/DL (ref 2.3–4.7)
PHOSPHATE SERPL-MCNC: 1.9 MG/DL (ref 2.3–4.7)
PLATELET # BLD AUTO: 194 X10(3)/MCL (ref 130–400)
PLATELET # BLD EST: ADEQUATE 10*3/UL
PMV BLD AUTO: 10.5 FL (ref 7.4–10.4)
POLYCHROMASIA BLD QL SMEAR: ABNORMAL
POTASSIUM SERPL-SCNC: 3.5 MMOL/L (ref 3.5–5.1)
POTASSIUM SERPL-SCNC: 3.6 MMOL/L (ref 3.5–5.1)
PROT SERPL-MCNC: 5.2 GM/DL (ref 6.4–8.3)
RBC # BLD AUTO: 3.75 X10(6)/MCL (ref 4.2–5.4)
RBC MORPH BLD: ABNORMAL
SODIUM SERPL-SCNC: 144 MMOL/L (ref 136–145)
SODIUM SERPL-SCNC: 145 MMOL/L (ref 136–145)
U1 SNRNP IGG SER-ACNC: 3 UNITS
WBC # SPEC AUTO: 14.5 X10(3)/MCL (ref 4.5–11.5)

## 2022-07-27 PROCEDURE — 25000003 PHARM REV CODE 250: Performed by: STUDENT IN AN ORGANIZED HEALTH CARE EDUCATION/TRAINING PROGRAM

## 2022-07-27 PROCEDURE — 27000221 HC OXYGEN, UP TO 24 HOURS

## 2022-07-27 PROCEDURE — 83735 ASSAY OF MAGNESIUM: CPT | Performed by: STUDENT IN AN ORGANIZED HEALTH CARE EDUCATION/TRAINING PROGRAM

## 2022-07-27 PROCEDURE — A4216 STERILE WATER/SALINE, 10 ML: HCPCS

## 2022-07-27 PROCEDURE — 25000003 PHARM REV CODE 250

## 2022-07-27 PROCEDURE — 36415 COLL VENOUS BLD VENIPUNCTURE: CPT | Performed by: STUDENT IN AN ORGANIZED HEALTH CARE EDUCATION/TRAINING PROGRAM

## 2022-07-27 PROCEDURE — 84100 ASSAY OF PHOSPHORUS: CPT | Performed by: STUDENT IN AN ORGANIZED HEALTH CARE EDUCATION/TRAINING PROGRAM

## 2022-07-27 PROCEDURE — 94761 N-INVAS EAR/PLS OXIMETRY MLT: CPT

## 2022-07-27 PROCEDURE — 94640 AIRWAY INHALATION TREATMENT: CPT

## 2022-07-27 PROCEDURE — 80053 COMPREHEN METABOLIC PANEL: CPT | Performed by: STUDENT IN AN ORGANIZED HEALTH CARE EDUCATION/TRAINING PROGRAM

## 2022-07-27 PROCEDURE — 25000003 PHARM REV CODE 250: Performed by: NURSE PRACTITIONER

## 2022-07-27 PROCEDURE — 85027 COMPLETE CBC AUTOMATED: CPT | Performed by: STUDENT IN AN ORGANIZED HEALTH CARE EDUCATION/TRAINING PROGRAM

## 2022-07-27 PROCEDURE — 99900035 HC TECH TIME PER 15 MIN (STAT)

## 2022-07-27 PROCEDURE — 82310 ASSAY OF CALCIUM: CPT | Performed by: STUDENT IN AN ORGANIZED HEALTH CARE EDUCATION/TRAINING PROGRAM

## 2022-07-27 PROCEDURE — 25000003 PHARM REV CODE 250: Performed by: INTERNAL MEDICINE

## 2022-07-27 PROCEDURE — 63600175 PHARM REV CODE 636 W HCPCS: Performed by: STUDENT IN AN ORGANIZED HEALTH CARE EDUCATION/TRAINING PROGRAM

## 2022-07-27 PROCEDURE — 11000001 HC ACUTE MED/SURG PRIVATE ROOM

## 2022-07-27 RX ORDER — SODIUM,POTASSIUM PHOSPHATES 280-250MG
1 POWDER IN PACKET (EA) ORAL ONCE
Status: COMPLETED | OUTPATIENT
Start: 2022-07-27 | End: 2022-07-27

## 2022-07-27 RX ORDER — FUROSEMIDE 20 MG/1
40 TABLET ORAL DAILY
Status: DISCONTINUED | OUTPATIENT
Start: 2022-07-27 | End: 2022-07-28

## 2022-07-27 RX ORDER — SODIUM,POTASSIUM PHOSPHATES 280-250MG
2 POWDER IN PACKET (EA) ORAL ONCE
Status: DISCONTINUED | OUTPATIENT
Start: 2022-07-27 | End: 2022-07-27

## 2022-07-27 RX ORDER — PREDNISONE 10 MG/1
10 TABLET ORAL DAILY
Status: DISCONTINUED | OUTPATIENT
Start: 2022-07-27 | End: 2022-07-27

## 2022-07-27 RX ORDER — POTASSIUM CHLORIDE 20 MEQ/1
40 TABLET, EXTENDED RELEASE ORAL ONCE
Status: DISCONTINUED | OUTPATIENT
Start: 2022-07-27 | End: 2022-07-27

## 2022-07-27 RX ORDER — GABAPENTIN 300 MG/1
300 CAPSULE ORAL 2 TIMES DAILY
Status: DISCONTINUED | OUTPATIENT
Start: 2022-07-27 | End: 2022-07-28 | Stop reason: HOSPADM

## 2022-07-27 RX ORDER — MAGNESIUM SULFATE HEPTAHYDRATE 40 MG/ML
4 INJECTION, SOLUTION INTRAVENOUS ONCE
Status: COMPLETED | OUTPATIENT
Start: 2022-07-27 | End: 2022-07-27

## 2022-07-27 RX ORDER — ERGOCALCIFEROL 1.25 MG/1
50000 CAPSULE ORAL DAILY
Status: DISCONTINUED | OUTPATIENT
Start: 2022-07-27 | End: 2022-07-27

## 2022-07-27 RX ORDER — AMLODIPINE BESYLATE 10 MG/1
10 TABLET ORAL DAILY
Status: DISCONTINUED | OUTPATIENT
Start: 2022-07-27 | End: 2022-07-28 | Stop reason: HOSPADM

## 2022-07-27 RX ORDER — ONDANSETRON 4 MG/1
4 TABLET, ORALLY DISINTEGRATING ORAL EVERY 6 HOURS PRN
Status: DISCONTINUED | OUTPATIENT
Start: 2022-07-27 | End: 2022-07-28 | Stop reason: HOSPADM

## 2022-07-27 RX ORDER — METOPROLOL TARTRATE 50 MG/1
100 TABLET ORAL DAILY
Status: DISCONTINUED | OUTPATIENT
Start: 2022-07-27 | End: 2022-07-28 | Stop reason: HOSPADM

## 2022-07-27 RX ADMIN — AMLODIPINE BESYLATE 10 MG: 10 TABLET ORAL at 03:07

## 2022-07-27 RX ADMIN — SODIUM CHLORIDE: 9 INJECTION, SOLUTION INTRAVENOUS at 05:07

## 2022-07-27 RX ADMIN — POTASSIUM, SODIUM PHOSPHATES 280 MG-160 MG-250 MG ORAL POWDER PACKET 1 PACKET: POWDER IN PACKET at 11:07

## 2022-07-27 RX ADMIN — FUROSEMIDE 40 MG: 20 TABLET ORAL at 05:07

## 2022-07-27 RX ADMIN — FLUTICASONE FUROATE AND VILANTEROL TRIFENATATE 1 PUFF: 100; 25 POWDER RESPIRATORY (INHALATION) at 07:07

## 2022-07-27 RX ADMIN — SODIUM CHLORIDE, PRESERVATIVE FREE 10 ML: 5 INJECTION INTRAVENOUS at 06:07

## 2022-07-27 RX ADMIN — DICYCLOMINE HYDROCHLORIDE 10 MG: 10 CAPSULE ORAL at 09:07

## 2022-07-27 RX ADMIN — POTASSIUM PHOSPHATE, MONOBASIC AND POTASSIUM PHOSPHATE, DIBASIC 30 MMOL: 224; 236 INJECTION, SOLUTION, CONCENTRATE INTRAVENOUS at 03:07

## 2022-07-27 RX ADMIN — GABAPENTIN 300 MG: 300 CAPSULE ORAL at 03:07

## 2022-07-27 RX ADMIN — DICYCLOMINE HYDROCHLORIDE 10 MG: 10 CAPSULE ORAL at 08:07

## 2022-07-27 RX ADMIN — MAGNESIUM SULFATE 4 G: 2 INJECTION INTRAVENOUS at 11:07

## 2022-07-27 RX ADMIN — POTASSIUM CHLORIDE 20 MEQ: 20 TABLET, EXTENDED RELEASE ORAL at 08:07

## 2022-07-27 RX ADMIN — SODIUM CHLORIDE, PRESERVATIVE FREE 10 ML: 5 INJECTION INTRAVENOUS at 03:07

## 2022-07-27 RX ADMIN — FOLIC ACID 1 MG: 1 TABLET ORAL at 08:07

## 2022-07-27 RX ADMIN — PANTOPRAZOLE SODIUM 40 MG: 40 TABLET, DELAYED RELEASE ORAL at 08:07

## 2022-07-27 RX ADMIN — PREDNISONE 10 MG: 10 TABLET ORAL at 08:07

## 2022-07-27 RX ADMIN — GABAPENTIN 300 MG: 300 CAPSULE ORAL at 09:07

## 2022-07-27 RX ADMIN — METOPROLOL TARTRATE 100 MG: 50 TABLET, FILM COATED ORAL at 03:07

## 2022-07-27 NOTE — PROGRESS NOTES
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 Calderon. Presents to ED as a transfer from [facility] for ARF. She reports she began experiencing SOB around 5AM. Denies any f/c, cough, orthopnea, leg swelling. She has some SOB at baseline, uses 3L home O2 but reports this was worse than usual. She has a hx of recurrent PNA requiring hospitalization. She also reports 2-3 week histroy of generalized fatigue, n/v, and diarrhea. Having around 2 episodes of emesis per day, associated w/ meals, difficulty tolerating PO intake, also having multiple episodes of loose watery stools daily. Denies any blood in the vomit/stool. Denies chest pain, abdominal pain, dysuria. Reports she is urinating less often and smaller volume. She has not seen Dr. Calderon since last December however she reports compliance w/ her Actemra and Ofev. No longer taking prednisone daily since that time. She denies any recent sick contacts. Last hospitalized 1 month ago w/ PNA.     At outside facility, work up showed elevated WBC 15, BUN/Crt 54/9, no electrolyte abnormalities. Troponin, BNP WNL. COVID negative. UA showed WBCs and bacteria. She was given 1L NS and Rocephin x1, trasnfered to [facility] for ARF. In ED, /75, afebrile, O2 100% on 3L. On my interview, she is alert and oriented x3, in no distress, somewhat lethargic. Answers all questions appropriately. Reports her SOB has since resolved and has no acute complaints. Repeat labs here show increase in WBC to 19, BUN/Crt 55/9, AST 71, . CXR shows increased interstitial markings but no obvious opacities. Renal US unremarkable. Medicine consulted for ARF.     Interval Hx 7/25/22: No acute events  overnight.  Vital signs stable this morning patient does not have any acute complaints.    No current facility-administered medications on file prior to encounter.     Current Outpatient Medications on File Prior to Encounter   Medication Sig    gabapentin (NEURONTIN) 300 MG capsule Take 300 mg by mouth 2 (two) times a day.    nintedanib (OFEV) 150 mg Cap Ofev 150 mg capsule    tocilizumab (ACTEMRA) 162 mg/0.9 mL injection Inject 162 mg into the skin once a week.    albuterol (PROVENTIL) 2.5 mg /3 mL (0.083 %) nebulizer solution Take 3 mLs by nebulization every 8 (eight) hours as needed.    albuterol (PROVENTIL/VENTOLIN HFA) 90 mcg/actuation inhaler Inhale 2 puffs into the lungs every 6 (six) hours.    amLODIPine (NORVASC) 10 MG tablet Take 10 mg by mouth once daily.    budesonide-formoterol 160-4.5 mcg (SYMBICORT) 160-4.5 mcg/actuation HFAA Inhale 1 puff into the lungs daily as needed.    diclofenac sodium (VOLTAREN) 1 % Gel Apply 1 Tube topically every 6 (six) hours as needed.    ergocalciferol (ERGOCALCIFEROL) 50,000 unit Cap Take 1 capsule by mouth Daily.    ezetimibe (ZETIA) 10 mg tablet Take 10 mg by mouth every evening.    furosemide (LASIX) 40 MG tablet Take 40 mg by mouth Daily.    loratadine (CLARITIN) 10 mg tablet Take 10 mg by mouth once daily at 6am.    meloxicam (MOBIC) 15 MG tablet Take 15 mg by mouth once daily.    metoprolol tartrate (LOPRESSOR) 100 MG tablet Take 100 mg by mouth once daily.    omeprazole (PRILOSEC) 20 MG capsule Take 20 mg by mouth once daily.    ondansetron (ZOFRAN-ODT) 4 MG TbDL Take 4 mg by mouth every 6 (six) hours as needed.    predniSONE (DELTASONE) 10 MG tablet Take 10 mg by mouth Daily.     Family History    None       Tobacco Use    Smoking status: Not on file    Smokeless tobacco: Not on file   Substance and Sexual Activity    Alcohol use: Not on file    Drug use: Not on file    Sexual activity: Not on file     ROS  Review of Systems    Constitutional: Negative for chills and fever.   HENT: Negative for congestion and sore throat.    Respiratory: Negative for shortness of breath and wheezing.    Cardiovascular: Negative for chest pain and leg swelling.   Gastrointestinal: Negative for abdominal pain, constipation, diarrhea, nausea and vomiting.   Genitourinary: Negative for dysuria.   Skin: Negative for rash.   Neurological: Negative for headaches.           Objective:     Vital Signs (Most Recent):  Temp: 97.6 °F (36.4 °C) (07/27/22 1111)  Pulse: (!) 50 (07/27/22 1111)  Resp: 20 (07/27/22 1111)  BP: 136/87 (07/27/22 1111)  SpO2: 99 % (07/27/22 1111) Vital Signs (24h Range):  Temp:  [97.5 °F (36.4 °C)-98.6 °F (37 °C)] 97.6 °F (36.4 °C)  Pulse:  [] 50  Resp:  [18-20] 20  SpO2:  [96 %-100 %] 99 %  BP: (108-149)/(72-88) 136/87     Weight: 78.9 kg (173 lb 15.1 oz)  Body mass index is 40.43 kg/m².    Physical Exam  Constitutional:       General: She is not in acute distress.     Appearance: She is obese. She is not toxic-appearing.   HENT:      Head: Atraumatic.      Nose: Nose normal. No congestion.      Comments: Nasal cannula in place     Mouth/Throat:      Pharynx: Oropharynx is clear. No oropharyngeal exudate.   Eyes:      Extraocular Movements: Extraocular movements intact.      Conjunctiva/sclera: Conjunctivae normal.      Pupils: Pupils are equal, round, and reactive to light.   Cardiovascular:      Rate and Rhythm: Normal rate and regular rhythm.      Pulses: Normal pulses.      Heart sounds: No murmur heard.    No gallop.   Pulmonary:      Effort: Pulmonary effort is normal. No respiratory distress.      Breath sounds: Normal breath sounds.   Abdominal:      General: Bowel sounds are normal. There is no distension.      Palpations: Abdomen is soft.      Tenderness: There is no abdominal tenderness.   Musculoskeletal:         General: No deformity.      Cervical back: Neck supple.      Right lower leg: No edema.      Left lower leg: No  edema.   Skin:     General: Skin is warm.      Capillary Refill: Capillary refill takes 2 to 3 seconds.      Findings: No rash.   Neurological:      General: No focal deficit present.      Mental Status: She is alert and oriented to person, place, and time.      Sensory: No sensory deficit.      Motor: No weakness.       Assessment/Plan:   ARF, improving  - BUN/Cr continuing to improve  - BUN/Cr 55.3/9.34 on admission  - AIN vs glomerulonephritis 2/2 to rheumatologic disease   - Pr/Cr elevated at 302  - Pending INES  - SPEP no evidence of gammopathy   - C3 and C4 WNL, p-ANCA, c-ANCA WNL, urine Na WNL  - Nephrology on board,, appreciate recs  - status post kidney biopsy on 07/26, waiting on pathology    Leukocytosis secondary to steroids.  - Continue home Prednisone 10 mg on 7/23  - Holding immuno modulators    Folate deficiency  -Folate 3.2  -Continue folic acid     Antisythetase syndrome  -C3 and C4 WNL, c-ANCA and p-ANCA negative  -Patient is on board for sending referral to Rheumatology to anyone in [place] that will accept her insurance. CM helping to find accepting providers     Hypokalemia  Hypophosphatemia  Hypomagnesemia  Critically low this morning, repleting, anticipate secondary to proximal tubular injury from a KI.  To continue to monitor patient overnight and repeat electrolytes and likely discharge home in morning.   Code: FULL code   DVT: Heparin   Lines: PIV   Abx: None      Dispo: 48 y/o female admitted to Medicine for ARF. Hx of Antisythetase syndrome.   Critically low electrolytes, replacing, likely discharge home in the morning tomorrow    Karen Lopez MD  [employer] IM Resident PGY-3

## 2022-07-27 NOTE — PROGRESS NOTES
"Nephrology Progress Note    Hospital course:   [age] year old  female with acute renal failure requiring hemodialysis for clearance purposes.  Patient is continuing to improved renal function without dialysis for at least 3 days.  Patient is status post kidney biopsy yesterday.  Hemoglobin hematocrit stable today.  Electrolytes are being replaced.    Subjective:   Patient relates that she feels much better this morning and is waiting to go home.    Objective:   BP (!) 149/86   Pulse 78   Temp 98.6 °F (37 °C) (Oral)   Resp 19   Ht 4' 7" (1.397 m)   Wt 78.9 kg (173 lb 15.1 oz)   SpO2 96%   Breastfeeding No   BMI 40.43 kg/m²     Intake/Output Summary (Last 24 hours) at 7/27/2022 1109  Last data filed at 7/27/2022 0600  Gross per 24 hour   Intake 1680 ml   Output --   Net 1680 ml        Physical exam:   Vitals noted.  General:  Patient is alert and oriented person place time no acute distress  HEENT:  Normocephalic atraumatic, pupils equal round reactive to light  Neck:  No JVD bruit thyromegaly adenopathy appreciated.  Lungs:  Clear to auscultation bilaterally all fields  Cardiovascular:  Regular rate and rhythm no murmur rub gallop appreciated  Abdomen:  Positive bowel sounds all 4 quadrants soft nontender nondistended  Extremities:  No clubbing ,cyanosis or edema noted  Neurologic:  No clonus asterixis appreciated cranials to the trial grossly intact    Laboratory data:   Recent Results (from the past 24 hour(s))   Comprehensive Metabolic Panel    Collection Time: 07/27/22  5:18 AM   Result Value Ref Range    Sodium Level 144 136 - 145 mmol/L    Potassium Level 3.5 3.5 - 5.1 mmol/L    Chloride 111 (H) 98 - 107 mmol/L    Carbon Dioxide 28 22 - 29 mmol/L    Glucose Level 81 74 - 100 mg/dL    Blood Urea Nitrogen 6.1 (L) 7.0 - 18.7 mg/dL    Creatinine 1.30 (H) 0.55 - 1.02 mg/dL    Calcium Level Total 7.4 (L) 8.4 - 10.2 mg/dL    Protein Total 5.2 (L) 6.4 - 8.3 gm/dL    Albumin Level 2.6 (L) " 3.5 - 5.0 gm/dL    Globulin 2.6 2.4 - 3.5 gm/dL    Albumin/Globulin Ratio 1.0 (L) 1.1 - 2.0 ratio    Bilirubin Total 0.3 <=1.5 mg/dL    Alkaline Phosphatase 54 40 - 150 unit/L    Alanine Aminotransferase 41 0 - 55 unit/L    Aspartate Aminotransferase 36 (H) 5 - 34 unit/L    Estimated GFR- 56 mls/min/1.73/m2   Magnesium    Collection Time: 07/27/22  5:18 AM   Result Value Ref Range    Magnesium Level 1.30 (L) 1.60 - 2.60 mg/dL   Phosphorus    Collection Time: 07/27/22  5:18 AM   Result Value Ref Range    Phosphorus Level 1.9 (L) 2.3 - 4.7 mg/dL   CBC with Differential    Collection Time: 07/27/22  5:18 AM   Result Value Ref Range    WBC 14.5 (H) 4.5 - 11.5 x10(3)/mcL    RBC 3.75 (L) 4.20 - 5.40 x10(6)/mcL    Hgb 10.2 (L) 12.0 - 16.0 gm/dL    Hct 32.1 (L) 37.0 - 47.0 %    MCV 85.6 80.0 - 94.0 fL    MCH 27.2 27.0 - 31.0 pg    MCHC 31.8 (L) 33.0 - 36.0 mg/dL    RDW 16.1 11.5 - 17.0 %    Platelet 194 130 - 400 x10(3)/mcL    MPV 10.5 (H) 7.4 - 10.4 fL    IG# 0.16 (H) 0 - 0.04 x10(3)/mcL    IG% 1.1 %    NRBC% 0.0 %   Manual Differential    Collection Time: 07/27/22  5:18 AM   Result Value Ref Range    Neut Man 74 47 - 80 %    Lymph Man 22 13 - 40 %    Monocyte Man 1 (L) 2 - 11 %    Eos Man 2 0 - 8 %    Basophil Man 1 0 - 2 %    Abs Neut calc 10.73 (H) 2.1 - 9.2 x10(3)/mcL    Abs Baso 0.145 0 - 0.2 x10(3)/mcL    Abs Mono 0.145 0.1 - 1.3 x10(3)/mcL    Abs Lymp 3.19 0.6 - 4.6 x10(3)/mcL    Abs Eos  0.29 0 - 0.9 x10(3)/mcL    RBC Morph Abnormal (A) Normal    Anisocyte 1+ (A) (none)    Polychrom 1+ (A) (none)    Platelet Est Adequate Normal, Adequate       Imaging:   Imaging Results          X-Ray Chest 1 View (Final result)  Result time 07/20/22 06:32:04   Procedure changed from X-Ray Chest PA And Lateral     Final result by Gabriel Clarke MD (07/20/22 06:32:04)                 Impression:      No acute cardiopulmonary process.  Findings of chronic lung disease.      Electronically signed by: Gabriel  Vince  Date:    07/20/2022  Time:    06:32             Narrative:    EXAMINATION:  XR CHEST 1 VIEW    CLINICAL HISTORY:  Shortness of breath;SOB;    TECHNIQUE:  Single view of the chest    COMPARISON:  07/19/2022    FINDINGS:  Prominent interstitial markings with no focal opacification.    The cardiomediastinal silhouette is within normal limits.    No acute osseous abnormality.                                 Medications:     Current Facility-Administered Medications:     0.9%  NaCl infusion, , Intravenous, Continuous, Evie Richter MD, Last Rate: 100 mL/hr at 07/27/22 0542, New Bag at 07/27/22 0542    acetaminophen tablet 1,000 mg, 1,000 mg, Oral, Q6H PRN, Molly Rey MD    albuterol-ipratropium 2.5 mg-0.5 mg/3 mL nebulizer solution 3 mL, 3 mL, Nebulization, Q6H PRN, Savage Kimble MD    dextrose 10% bolus 125 mL, 12.5 g, Intravenous, PRN, Kashmir Box MD    dextrose 10% bolus 250 mL, 25 g, Intravenous, PRN, Kashmir Box MD    dicyclomine capsule 10 mg, 10 mg, Oral, BID, Molly Rey MD, 10 mg at 07/27/22 0834    ergocalciferol capsule 50,000 Units, 50,000 Units, Oral, Q7 Days, Evie Richter MD, 50,000 Units at 07/24/22 1518    fluticasone furoate-vilanteroL 100-25 mcg/dose diskus inhaler 1 puff, 1 puff, Inhalation, Daily, 1 puff at 07/27/22 0751 **AND** MDI Daily, , , Daily, Molly Rey MD    folic acid tablet 1 mg, 1 mg, Oral, Daily, Molly Rey MD, 1 mg at 07/27/22 0834    glucagon (human recombinant) injection 1 mg, 1 mg, Intramuscular, PRN, Savage Kimble MD    glucose chewable tablet 16 g, 16 g, Oral, PRN, Savage Kimble MD    glucose chewable tablet 24 g, 24 g, Oral, PRN, Savage Kimble MD    hydrOXYzine pamoate capsule 25 mg, 25 mg, Oral, Nightly PRN, Hai Bills MD, 25 mg at 07/24/22 2051    magnesium sulfate 2g in water 50mL IVPB (premix), 4 g, Intravenous, Once, Pavana L  MD Jessica    melatonin tablet 6 mg, 6 mg, Oral, Nightly PRN, Hai Bills MD, 6 mg at 07/20/22 2056    naloxone 0.4 mg/mL injection 0.02 mg, 0.02 mg, Intravenous, PRN, Savage Kimble MD    ondansetron injection 8 mg, 8 mg, Intravenous, Q8H PRN, Molly Rey MD, 8 mg at 07/22/22 0848    pantoprazole EC tablet 40 mg, 40 mg, Oral, Daily, Molly Rey MD, 40 mg at 07/27/22 0834    potassium chloride SA CR tablet 20 mEq, 20 mEq, Oral, Daily, Lexie Shane, FNP, 20 mEq at 07/27/22 0834    potassium, sodium phosphates 280-160-250 mg packet 1 packet, 1 packet, Oral, Once, Karen Lopez MD    predniSONE tablet 10 mg, 10 mg, Oral, Daily, Molly Rey MD, 10 mg at 07/27/22 0834    sodium chloride 0.9% flush 10 mL, 10 mL, Intravenous, Q12H PRN, Savage Kimble MD    Flushing PICC Protocol, , , Until Discontinued **AND** sodium chloride 0.9% flush 10 mL, 10 mL, Intravenous, Q6H, 10 mL at 07/26/22 1800 **AND** sodium chloride 0.9% flush 10 mL, 10 mL, Intravenous, PRN, Keiko Soto,      Impression/Plan:    1. Acute nonoliguric renal failure:  Patient required hemodialysis for clearance purposes and has not had dialysis for 3 days and creatinine is 1.3 today.  Patient is recovering function.  Patient did have kidney biopsy yesterday.  Hemoglobin hematocrit stable.  Agree that patient is stable for discharge when primary team feels that patient is ready to go home.  Will be glad to see patient in CKD Clinic for follow-up.  Will follow-up on kidney biopsy results.  Suspect proximal tubular injury with hypokalemia hypophosphatemia hypomagnesemia this point.  Patient likely has resolving acute tubular necrosis.  However, other etiologies of acute renal failure cannot be ruled out.  We are awaiting kidney biopsy results.    2. Hypokalemia and hypophosphatemia:  Replete pink.    3. Hypomagnesemia:  Patient is receiving repletion.    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    5. Normochromic normocytic anemia:  Stable hemoglobin after kidney biopsy.  Monitor CBC on outpatient basis.      Addendum:  Repeat phosphorus is 1.5.  Will give 30 millimoles K-Phos IV. I discussed with Dr. Lopez.  Agree that we need to continue to observe this patient with severe electrolyte abnormalities and patient will unlikely be      Evie Richter M.D.  Nephrology

## 2022-07-27 NOTE — PT/OT/SLP PROGRESS
"Physical Therapy  Missed Treatment Note    Patient Name:  Ofelia Brady   MRN:  [id number]    Patient not seen today secondary to Patient unwilling to participate. PT provided encouragement for OOB. Pt stated she was walking in the room. PT asked further questions about home assist and DME. Pt stated she had both. PT continued to provided encouragement for at least activity in room and pt again stated "she walked in the room, that was enough." PT asked pt if she wanted PT services and she told this PT  " I don't want to be rude but get out of the room! ". PT offered to return however pt no longer acknowledging or speaking to PT. Charge nurse nickie notified.  Will follow-up as scheduled pending pt participation.        "

## 2022-07-27 NOTE — PLAN OF CARE
Problem: Adult Inpatient Plan of Care  Goal: Plan of Care Review  Outcome: Ongoing, Progressing  Goal: Patient-Specific Goal (Individualized)  Outcome: Ongoing, Progressing  Goal: Absence of Hospital-Acquired Illness or Injury  Outcome: Ongoing, Progressing  Goal: Optimal Comfort and Wellbeing  Outcome: Ongoing, Progressing  Goal: Readiness for Transition of Care  Outcome: Ongoing, Progressing     Problem: Bariatric Environmental Safety  Goal: Safety Maintained with Care  Outcome: Ongoing, Progressing     Problem: Fluid and Electrolyte Imbalance (Acute Kidney Injury/Impairment)  Goal: Fluid and Electrolyte Balance  Outcome: Ongoing, Progressing     Problem: Gas Exchange Impaired  Goal: Optimal Gas Exchange  Outcome: Ongoing, Progressing     Problem: Oral Intake Inadequate (Acute Kidney Injury/Impairment)  Goal: Optimal Nutrition Intake  Outcome: Ongoing, Progressing     Problem: Renal Function Impairment (Acute Kidney Injury/Impairment)  Goal: Effective Renal Function  Outcome: Ongoing, Progressing

## 2022-07-27 NOTE — PROGRESS NOTES
Pt refused last 3 treatment attempts; OT services will be d/c'd; Reconsult should pt agree to participate and as appropriate.

## 2022-07-27 NOTE — PT/OT/SLP DISCHARGE
Occupational Therapy Discharge Summary    Ofelia Brady  MRN: [id number]   Principal Problem: Acute renal failure      Patient Discharged from acute Occupational Therapy on 7/27/22.  /Please refer to prior OT eval dated  7/20/22 for functional status.  /  Assessment:      Patient was discharged as she has refused 3 treatment attempts in a row.  Information required to complete an accurate discharge summary is unknown.  Refer to therapy initial evaluation for  most recent functional status .  Recommendations made may be found in medical record.    Objective:     GOALS:   Multidisciplinary Problems     Occupational Therapy Goals        Problem: Occupational Therapy    Goal Priority Disciplines Outcome Interventions   Occupational Therapy Goal     OT, PT/OT Pt did not particpate in POC/refused    Description: Patient will perform grooming with standby assist while standing at sink.    Patient will perform toileting with mod I using toilet or BSC.-d/c goal as pt refused services       Patient will perform toilet transfer with SBA with use of grab bars or RW.-d/c goal as pt refused services       Patient will perform upper body dressing with setup assist while seated EOB.-d/c goal as pt refused services        Patient will perform lower body dressing with min assist to don socks/shoes while seated EOB. -d/c goal as pt refused services                        Reasons for Discontinuation of Therapy Services  Patient declines to continue care.      Plan:     Patient Discharged to: pt remaining in acute care setting at this time; pt refused participation in POC    7/27/2022

## 2022-07-28 VITALS
TEMPERATURE: 98 F | OXYGEN SATURATION: 96 % | BODY MASS INDEX: 39.99 KG/M2 | HEART RATE: 94 BPM | HEIGHT: 55 IN | DIASTOLIC BLOOD PRESSURE: 60 MMHG | WEIGHT: 172.81 LBS | SYSTOLIC BLOOD PRESSURE: 91 MMHG | RESPIRATION RATE: 18 BRPM

## 2022-07-28 DIAGNOSIS — N17.9 AKI (ACUTE KIDNEY INJURY): Primary | ICD-10-CM

## 2022-07-28 LAB
ABS NEUT CALC (OHS): 8.04 X10(3)/MCL (ref 2.1–9.2)
ALBUMIN SERPL-MCNC: 3.1 GM/DL (ref 3.5–5)
ALBUMIN/GLOB SERPL: 1 RATIO (ref 1.1–2)
ALP SERPL-CCNC: 62 UNIT/L (ref 40–150)
ALT SERPL-CCNC: 37 UNIT/L (ref 0–55)
AST SERPL-CCNC: 27 UNIT/L (ref 5–34)
BILIRUBIN DIRECT+TOT PNL SERPL-MCNC: 0.3 MG/DL
BUN SERPL-MCNC: 7.8 MG/DL (ref 7–18.7)
CALCIUM SERPL-MCNC: 7.4 MG/DL (ref 8.4–10.2)
CHLORIDE SERPL-SCNC: 106 MMOL/L (ref 98–107)
CO2 SERPL-SCNC: 29 MMOL/L (ref 22–29)
CREAT SERPL-MCNC: 1.07 MG/DL (ref 0.55–1.02)
EOSINOPHIL NFR BLD MANUAL: 0.16 X10(3)/MCL (ref 0–0.9)
EOSINOPHIL NFR BLD MANUAL: 1 % (ref 0–8)
ERYTHROCYTE [DISTWIDTH] IN BLOOD BY AUTOMATED COUNT: 16.1 % (ref 11.5–17)
GLOBULIN SER-MCNC: 3 GM/DL (ref 2.4–3.5)
GLUCOSE SERPL-MCNC: 82 MG/DL (ref 74–100)
HCT VFR BLD AUTO: 34.5 % (ref 37–47)
HGB BLD-MCNC: 11.1 GM/DL (ref 12–16)
IMM GRANULOCYTES # BLD AUTO: 0.19 X10(3)/MCL (ref 0–0.04)
IMM GRANULOCYTES NFR BLD AUTO: 1.2 %
LYMPHOCYTES NFR BLD MANUAL: 47 % (ref 13–40)
LYMPHOCYTES NFR BLD MANUAL: 7.71 X10(3)/MCL
MAGNESIUM SERPL-MCNC: 1.4 MG/DL (ref 1.6–2.6)
MCH RBC QN AUTO: 27.1 PG (ref 27–31)
MCHC RBC AUTO-ENTMCNC: 32.2 MG/DL (ref 33–36)
MCV RBC AUTO: 84.1 FL (ref 80–94)
MONOCYTES NFR BLD MANUAL: 0.49 X10(3)/MCL (ref 0.1–1.3)
MONOCYTES NFR BLD MANUAL: 3 % (ref 2–11)
NEUTROPHILS NFR BLD MANUAL: 49 % (ref 47–80)
NRBC BLD AUTO-RTO: 0 %
PHOSPHATE SERPL-MCNC: 3.7 MG/DL (ref 2.3–4.7)
PHOSPHATE SERPL-MCNC: 4.1 MG/DL (ref 2.3–4.7)
PLATELET # BLD AUTO: 244 X10(3)/MCL (ref 130–400)
PLATELET # BLD EST: ADEQUATE 10*3/UL
PMV BLD AUTO: 10.8 FL (ref 7.4–10.4)
POTASSIUM SERPL-SCNC: 3.4 MMOL/L (ref 3.5–5.1)
PROT SERPL-MCNC: 6.1 GM/DL (ref 6.4–8.3)
RBC # BLD AUTO: 4.1 X10(6)/MCL (ref 4.2–5.4)
RBC MORPH BLD: NORMAL
SODIUM SERPL-SCNC: 149 MMOL/L (ref 136–145)
WBC # SPEC AUTO: 16.4 X10(3)/MCL (ref 4.5–11.5)

## 2022-07-28 PROCEDURE — 36415 COLL VENOUS BLD VENIPUNCTURE: CPT | Performed by: STUDENT IN AN ORGANIZED HEALTH CARE EDUCATION/TRAINING PROGRAM

## 2022-07-28 PROCEDURE — 63600175 PHARM REV CODE 636 W HCPCS: Performed by: STUDENT IN AN ORGANIZED HEALTH CARE EDUCATION/TRAINING PROGRAM

## 2022-07-28 PROCEDURE — 80053 COMPREHEN METABOLIC PANEL: CPT | Performed by: STUDENT IN AN ORGANIZED HEALTH CARE EDUCATION/TRAINING PROGRAM

## 2022-07-28 PROCEDURE — 84100 ASSAY OF PHOSPHORUS: CPT | Performed by: NURSE PRACTITIONER

## 2022-07-28 PROCEDURE — 63600175 PHARM REV CODE 636 W HCPCS: Performed by: INTERNAL MEDICINE

## 2022-07-28 PROCEDURE — 25000003 PHARM REV CODE 250: Performed by: STUDENT IN AN ORGANIZED HEALTH CARE EDUCATION/TRAINING PROGRAM

## 2022-07-28 PROCEDURE — 25000003 PHARM REV CODE 250

## 2022-07-28 PROCEDURE — 63600175 PHARM REV CODE 636 W HCPCS

## 2022-07-28 PROCEDURE — 84100 ASSAY OF PHOSPHORUS: CPT | Performed by: STUDENT IN AN ORGANIZED HEALTH CARE EDUCATION/TRAINING PROGRAM

## 2022-07-28 PROCEDURE — 83735 ASSAY OF MAGNESIUM: CPT | Performed by: STUDENT IN AN ORGANIZED HEALTH CARE EDUCATION/TRAINING PROGRAM

## 2022-07-28 PROCEDURE — 94640 AIRWAY INHALATION TREATMENT: CPT

## 2022-07-28 PROCEDURE — 25000003 PHARM REV CODE 250: Performed by: INTERNAL MEDICINE

## 2022-07-28 PROCEDURE — 97116 GAIT TRAINING THERAPY: CPT

## 2022-07-28 PROCEDURE — 99900035 HC TECH TIME PER 15 MIN (STAT)

## 2022-07-28 PROCEDURE — A4216 STERILE WATER/SALINE, 10 ML: HCPCS

## 2022-07-28 PROCEDURE — 85027 COMPLETE CBC AUTOMATED: CPT | Performed by: STUDENT IN AN ORGANIZED HEALTH CARE EDUCATION/TRAINING PROGRAM

## 2022-07-28 RX ORDER — IBUPROFEN 800 MG/1
800 TABLET ORAL 3 TIMES DAILY PRN
COMMUNITY

## 2022-07-28 RX ORDER — POTASSIUM CHLORIDE 20 MEQ/1
20 TABLET, EXTENDED RELEASE ORAL DAILY
Qty: 14 TABLET | Refills: 0 | Status: SHIPPED | OUTPATIENT
Start: 2022-07-28 | End: 2022-08-11

## 2022-07-28 RX ORDER — CALCIUM GLUCONATE 20 MG/ML
1 INJECTION, SOLUTION INTRAVENOUS ONCE
Status: COMPLETED | OUTPATIENT
Start: 2022-07-28 | End: 2022-07-28

## 2022-07-28 RX ORDER — POTASSIUM CHLORIDE 20 MEQ/1
40 TABLET, EXTENDED RELEASE ORAL ONCE
Status: COMPLETED | OUTPATIENT
Start: 2022-07-28 | End: 2022-07-28

## 2022-07-28 RX ORDER — FLUTICASONE PROPIONATE 250 UG/1
1 POWDER, METERED RESPIRATORY (INHALATION) DAILY
COMMUNITY

## 2022-07-28 RX ORDER — SODIUM,POTASSIUM PHOSPHATES 280-250MG
2 POWDER IN PACKET (EA) ORAL ONCE
Status: DISCONTINUED | OUTPATIENT
Start: 2022-07-28 | End: 2022-07-28

## 2022-07-28 RX ORDER — FUROSEMIDE 40 MG/1
TABLET ORAL
COMMUNITY
End: 2022-09-16

## 2022-07-28 RX ORDER — HEPARIN SODIUM 5000 [USP'U]/ML
5000 INJECTION, SOLUTION INTRAVENOUS; SUBCUTANEOUS EVERY 12 HOURS
Status: DISCONTINUED | OUTPATIENT
Start: 2022-07-28 | End: 2022-07-28 | Stop reason: HOSPADM

## 2022-07-28 RX ORDER — MAGNESIUM SULFATE HEPTAHYDRATE 40 MG/ML
2 INJECTION, SOLUTION INTRAVENOUS ONCE
Status: COMPLETED | OUTPATIENT
Start: 2022-07-28 | End: 2022-07-28

## 2022-07-28 RX ORDER — LANOLIN ALCOHOL/MO/W.PET/CERES
400 CREAM (GRAM) TOPICAL DAILY
Qty: 14 TABLET | Refills: 0 | Status: SHIPPED | OUTPATIENT
Start: 2022-07-28 | End: 2022-08-11

## 2022-07-28 RX ADMIN — CALCIUM GLUCONATE 1 G: 20 INJECTION, SOLUTION INTRAVENOUS at 09:07

## 2022-07-28 RX ADMIN — DICYCLOMINE HYDROCHLORIDE 10 MG: 10 CAPSULE ORAL at 09:07

## 2022-07-28 RX ADMIN — FOLIC ACID 1 MG: 1 TABLET ORAL at 09:07

## 2022-07-28 RX ADMIN — SODIUM CHLORIDE, PRESERVATIVE FREE 10 ML: 5 INJECTION INTRAVENOUS at 07:07

## 2022-07-28 RX ADMIN — POTASSIUM CHLORIDE 40 MEQ: 20 TABLET, EXTENDED RELEASE ORAL at 09:07

## 2022-07-28 RX ADMIN — MAGNESIUM SULFATE 2 G: 2 INJECTION INTRAVENOUS at 09:07

## 2022-07-28 RX ADMIN — PREDNISONE 10 MG: 10 TABLET ORAL at 09:07

## 2022-07-28 RX ADMIN — PANTOPRAZOLE SODIUM 40 MG: 40 TABLET, DELAYED RELEASE ORAL at 09:07

## 2022-07-28 RX ADMIN — FLUTICASONE FUROATE AND VILANTEROL TRIFENATATE 1 PUFF: 100; 25 POWDER RESPIRATORY (INHALATION) at 07:07

## 2022-07-28 RX ADMIN — METOPROLOL TARTRATE 100 MG: 50 TABLET, FILM COATED ORAL at 09:07

## 2022-07-28 RX ADMIN — SODIUM CHLORIDE, PRESERVATIVE FREE 10 ML: 5 INJECTION INTRAVENOUS at 12:07

## 2022-07-28 RX ADMIN — AMLODIPINE BESYLATE 10 MG: 10 TABLET ORAL at 09:07

## 2022-07-28 RX ADMIN — GABAPENTIN 300 MG: 300 CAPSULE ORAL at 09:07

## 2022-07-28 RX ADMIN — POTASSIUM CHLORIDE 20 MEQ: 20 TABLET, EXTENDED RELEASE ORAL at 09:07

## 2022-07-28 RX ADMIN — HEPARIN SODIUM 5000 UNITS: 5000 INJECTION, SOLUTION INTRAVENOUS; SUBCUTANEOUS at 09:07

## 2022-07-28 NOTE — PLAN OF CARE
07/28/22 1107   Medicare Message   Important Message from Medicare regarding Discharge Appeal Rights Given to patient/caregiver;Explained to patient/caregiver;Signed/date by patient/caregiver   Date IMM was signed 07/28/22   Time IMM was signed 110

## 2022-07-28 NOTE — PROGRESS NOTES
"Nephrology Progress Note    Hospital course:   47 year old  female with antisynthetase syndrome presented with  acute renal failure requiring HD for clearance. Patient without HD for 3 days and recovering function. Phosphorous replaced yesterday and phosphorous level 4.1. Magnesium , calcium and potassium require repletion today.    Subjective:   Patient feels well and ready to go home.    Objective:   /77   Pulse 91   Temp 98.2 °F (36.8 °C) (Oral)   Resp 18   Ht 4' 7" (1.397 m)   Wt 78.4 kg (172 lb 13.5 oz)   SpO2 100%   Breastfeeding No   BMI 40.17 kg/m²   No intake or output data in the 24 hours ending 07/28/22 1059     Physical exam:   Vitals noted.  General: alert and oriented to person, place and time  HEENTL: PERRLA, EOMI bilaterally, normocephalic, atraumatic  Neck: no JVD bruits  Lungs: CTA bilaterally  CVSL RRR, no m/r/g  Abdomen: positve bowel sounds all 4 quadrants  Extremities: no clubbing, cyanosis . Trace edema lower extremities bilaterally  Neurologic: CN 2-12 grossly intact, no clonus or asterixis      Laboratory data:   Recent Results (from the past 24 hour(s))   Basic Metabolic Panel    Collection Time: 07/27/22  1:04 PM   Result Value Ref Range    Sodium Level 145 136 - 145 mmol/L    Potassium Level 3.6 3.5 - 5.1 mmol/L    Chloride 109 (H) 98 - 107 mmol/L    Carbon Dioxide 28 22 - 29 mmol/L    Glucose Level 106 (H) 74 - 100 mg/dL    Blood Urea Nitrogen 5.9 (L) 7.0 - 18.7 mg/dL    Creatinine 1.25 (H) 0.55 - 1.02 mg/dL    BUN/Creatinine Ratio 5     Calcium Level Total 7.7 (L) 8.4 - 10.2 mg/dL    Estimated GFR- 59 mls/min/1.73/m2    Anion Gap 8.0 mEq/L   Magnesium    Collection Time: 07/27/22  1:04 PM   Result Value Ref Range    Magnesium Level 2.40 1.60 - 2.60 mg/dL   Phosphorus    Collection Time: 07/27/22  1:04 PM   Result Value Ref Range    Phosphorus Level 1.5 (L) 2.3 - 4.7 mg/dL   Comprehensive Metabolic Panel    Collection Time: 07/28/22  7:36 AM "   Result Value Ref Range    Sodium Level 149 (H) 136 - 145 mmol/L    Potassium Level 3.4 (L) 3.5 - 5.1 mmol/L    Chloride 106 98 - 107 mmol/L    Carbon Dioxide 29 22 - 29 mmol/L    Glucose Level 82 74 - 100 mg/dL    Blood Urea Nitrogen 7.8 7.0 - 18.7 mg/dL    Creatinine 1.07 (H) 0.55 - 1.02 mg/dL    Calcium Level Total 7.4 (L) 8.4 - 10.2 mg/dL    Protein Total 6.1 (L) 6.4 - 8.3 gm/dL    Albumin Level 3.1 (L) 3.5 - 5.0 gm/dL    Globulin 3.0 2.4 - 3.5 gm/dL    Albumin/Globulin Ratio 1.0 (L) 1.1 - 2.0 ratio    Bilirubin Total 0.3 <=1.5 mg/dL    Alkaline Phosphatase 62 40 - 150 unit/L    Alanine Aminotransferase 37 0 - 55 unit/L    Aspartate Aminotransferase 27 5 - 34 unit/L    Estimated GFR- >60 mls/min/1.73/m2   Magnesium    Collection Time: 07/28/22  7:36 AM   Result Value Ref Range    Magnesium Level 1.40 (L) 1.60 - 2.60 mg/dL   Phosphorus    Collection Time: 07/28/22  7:36 AM   Result Value Ref Range    Phosphorus Level 4.1 2.3 - 4.7 mg/dL   CBC with Differential    Collection Time: 07/28/22  7:36 AM   Result Value Ref Range    WBC 16.4 (H) 4.5 - 11.5 x10(3)/mcL    RBC 4.10 (L) 4.20 - 5.40 x10(6)/mcL    Hgb 11.1 (L) 12.0 - 16.0 gm/dL    Hct 34.5 (L) 37.0 - 47.0 %    MCV 84.1 80.0 - 94.0 fL    MCH 27.1 27.0 - 31.0 pg    MCHC 32.2 (L) 33.0 - 36.0 mg/dL    RDW 16.1 11.5 - 17.0 %    Platelet 244 130 - 400 x10(3)/mcL    MPV 10.8 (H) 7.4 - 10.4 fL    IG# 0.19 (H) 0 - 0.04 x10(3)/mcL    IG% 1.2 %    NRBC% 0.0 %   Manual Differential    Collection Time: 07/28/22  7:36 AM   Result Value Ref Range    Neut Man 49 47 - 80 %    Lymph Man 47 (H) 13 - 40 %    Monocyte Man 3 2 - 11 %    Eos Man 1 0 - 8 %    Abs Neut calc 8.036 2.1 - 9.2 x10(3)/mcL    Abs Mono 0.492 0.1 - 1.3 x10(3)/mcL    Abs Lymp 7.708 (H) 0.6 - 4.6 x10(3)/mcL    Abs Eos  0.164 0 - 0.9 x10(3)/mcL    RBC Morph Normal Normal    Platelet Est Adequate Normal, Adequate       Imaging:   Imaging Results          X-Ray Chest 1 View (Final result)  Result  time 07/20/22 06:32:04   Procedure changed from X-Ray Chest PA And Lateral     Final result by Gabriel Clarke MD (07/20/22 06:32:04)                 Impression:      No acute cardiopulmonary process.  Findings of chronic lung disease.      Electronically signed by: Gabriel Clarke  Date:    07/20/2022  Time:    06:32             Narrative:    EXAMINATION:  XR CHEST 1 VIEW    CLINICAL HISTORY:  Shortness of breath;SOB;    TECHNIQUE:  Single view of the chest    COMPARISON:  07/19/2022    FINDINGS:  Prominent interstitial markings with no focal opacification.    The cardiomediastinal silhouette is within normal limits.    No acute osseous abnormality.                                 Medications:     Current Facility-Administered Medications:     acetaminophen tablet 1,000 mg, 1,000 mg, Oral, Q6H PRN, Shannon Arvizudia, DO    albuterol-ipratropium 2.5 mg-0.5 mg/3 mL nebulizer solution 3 mL, 3 mL, Nebulization, Q6H PRN, Shannon Arvizudia, DO    amLODIPine tablet 10 mg, 10 mg, Oral, Daily, Shannon Arvizudia, DO, 10 mg at 07/28/22 0955    dextrose 10% bolus 125 mL, 12.5 g, Intravenous, PRN, Shannon Arvizudia, DO    dextrose 10% bolus 250 mL, 25 g, Intravenous, PRN, Shannon Arvizudia, DO    dicyclomine capsule 10 mg, 10 mg, Oral, BID, Shannon Arvizudia, DO, 10 mg at 07/28/22 0955    ergocalciferol capsule 50,000 Units, 50,000 Units, Oral, Q7 Days, Shannon Arvizudia, DO, 50,000 Units at 07/24/22 1518    fluticasone furoate-vilanteroL 100-25 mcg/dose diskus inhaler 1 puff, 1 puff, Inhalation, Daily, 1 puff at 07/28/22 0729 **AND** MDI Daily, , , Daily, Shannon Arvizudia, DO    folic acid tablet 1 mg, 1 mg, Oral, Daily, Shannon Arvizudia, DO, 1 mg at 07/28/22 0954    gabapentin capsule 300 mg, 300 mg, Oral, BID, Shannon Arvizudia, DO, 300 mg at 07/28/22 0954    glucagon (human recombinant) injection 1 mg, 1 mg, Intramuscular, PRN, Shannon Holcomb, DO    glucose chewable tablet 16 g, 16 g, Oral, PRShannon FULLER,     glucose chewable tablet 24 g, 24  g, Oral, PRN, Shannon Arvizudia, DO    heparin (porcine) injection 5,000 Units, 5,000 Units, Subcutaneous, Q12H, Karen Lopez MD, 5,000 Units at 07/28/22 0955    hydrOXYzine pamoate capsule 25 mg, 25 mg, Oral, Nightly PRN, Shannon Arvizudia, DO, 25 mg at 07/24/22 2051    magnesium sulfate 2g in water 50mL IVPB (premix), 2 g, Intravenous, Once, Evie Richter MD, Last Rate: 25 mL/hr at 07/28/22 0955, 2 g at 07/28/22 0955    melatonin tablet 6 mg, 6 mg, Oral, Nightly PRN, Shannon Leea, DO, 6 mg at 07/20/22 2056    metoprolol tartrate (LOPRESSOR) tablet 100 mg, 100 mg, Oral, Daily, Shannon Arvizudia, DO, 100 mg at 07/28/22 0955    naloxone 0.4 mg/mL injection 0.02 mg, 0.02 mg, Intravenous, PRN, Shannon Arvizudia, DO    ondansetron disintegrating tablet 4 mg, 4 mg, Oral, Q6H PRN, Shannon Arvizudia, DO    ondansetron injection 8 mg, 8 mg, Intravenous, Q8H PRN, Shannon Arvizudia, DO, 8 mg at 07/22/22 0848    pantoprazole EC tablet 40 mg, 40 mg, Oral, Daily, Shannon Arvizudia, DO, 40 mg at 07/28/22 0955    potassium chloride SA CR tablet 20 mEq, 20 mEq, Oral, Daily, Shannon Jindia, DO, 20 mEq at 07/28/22 0954    predniSONE tablet 10 mg, 10 mg, Oral, Daily, Shannon Jindia, DO, 10 mg at 07/28/22 0955    sodium chloride 0.9% flush 10 mL, 10 mL, Intravenous, Q12H PRN, Shannon Arvizudia, DO    Flushing PICC Protocol, , , Until Discontinued **AND** sodium chloride 0.9% flush 10 mL, 10 mL, Intravenous, Q6H, 10 mL at 07/28/22 0703 **AND** sodium chloride 0.9% flush 10 mL, 10 mL, Intravenous, PRN, Shannon Jindia, DO     Impression/Plan:  1. Acute renal failure: non oliguric. Likely resolving ATN. Kidney biopsy results pending. Remain well hydrated, avoid nsaids. Patient to follow up in CKD clinic after discharge.    2. Hypocalcemia: corrected calcium: 8.2. Calcium gluconate 1 gm IV x 1.    3. Hypophophotemia: corrected with repletion.    4. Hypomagnesemia: replete with 2 gm IV x 1.    5. Hypokalemia: agree with repletion.    Evie PULIDO  KD Richter.  Nephrology

## 2022-07-28 NOTE — PT/OT/SLP PROGRESS
Physical Therapy Treatment Note  and Discharge Summary    Name: Ofelia Brady  MRN: [id number]   Principal Problem: Acute renal failure       Recommendations:     Discharge Recommendations:  home  Discharge Equipment Recommendations:  none  Barriers to discharge: None    Subjective     Chief Complaint: none  Patient/Family Comments/goals: none stated  Pain/Comfort:  ·  Pain Rating 1: 0/10      Objective:     Communicated with nurse  prior to session.  Patient found HOB elevated with peripheral IV upon PT entry to room. Son present    General Precautions: Standard,     Orthopedic Precautions:N/A   Braces: N/A  Respiratory Status: Nasal cannula, flow 2 L/min     Functional Mobility:  · Bed Mobility:     · Supine to Sit: modified independence  · Transfers:     · Sit to Stand:  modified independence with no AD  · Gait: . Patient ambulated 130ft with No Assistive Device and modified independence  using swing through. Patient demonstrated gait pattern WFL without use of AD. No LOB or mis steps. Pt declined additional gait training.   ·         Patient left sitting edge of bed with all lines intact, call button in reach, son present and nurse tech notified. nurse was not present.. patient declined assist with returning to bed      Time Tracking:     PT Received On: 07/28/22  PT Start Time: 1037     PT Stop Time: 1046  PT Total Time (min): 9 min       Billable Minutes: Gait Training 9       Patient Discharged from acute Physical Therapy on 7/28/22.  Please refer to prior PT noted date on 7/28/22 for functional status.     Assessment:     Patient has not met goals.    Objective:     GOALS:   Multidisciplinary Problems     Physical Therapy Goals        Problem: Physical Therapy    Goal Priority Disciplines Outcome Goal Variances Interventions   Physical Therapy Goal     PT, PT/OT Goals met; pt is mod (I)     Description: Goals to be met by: D/C     Patient will increase functional independence with mobility by  performin. Supine to sit with MInimal Assistance-met  2. Sit to stand transfer with Minimal Assistance-met  3. Gait  x 130 feet with Minimal Assistance using Rolling Walker. met                     Reasons for Discontinuation of Therapy Services  Satisfactory goal achievement.      Plan:     Patient Discharged to: inhouse to nursing. anticipate d/c home..      2022

## 2022-07-28 NOTE — DISCHARGE SUMMARY
[facility] Discharge Summary    Admitting Physician: Keiko Soto DO  Attending Physician: Keiko Soto DO  Date of Admit: 7/19/2022  Date of Discharge: 7/28/2022    Discharge to:    Condition: Stable    Discharge Diagnoses     Patient Active Problem List   Diagnosis    Acute renal failure    Metabolic acidosis    Hypokalemia    Hypoglycemia    Hyperphosphatemia    Antisynthetase syndrome    ILD (interstitial lung disease)    Hypomagnesemia    Hypophosphatasia       Consultants and Procedures     Consultants:  Consults (From admission, onward)        Status Ordering Provider     Inpatient consult to Midline team  Once        Provider:  (Not yet assigned)    Acknowledged DANIEL WAKEFIELD     Inpatient consult to Social Work/Case Management  Once        Provider:  (Not yet assigned)    Completed DIANA ELENA     Inpatient consult to General Surgery  Once        Provider:  Shannon Reynolds MD    Acknowledged DANIEL WAKEFIELD     Inpatient consult to Nephrology  Once        Provider:  Evie Richter MD    Completed MICHAEL GUTHRIE     Inpatient consult to Hospitalist  Once        Provider:  (Not yet assigned)    Acknowledged DANIEL WAKEFIELD           Procedures:   Interventional radiology guided kidney biopsy done on 07/26/2022, awaiting pathology    Brief History of Present Illness       Patient is a 46yo F w/ 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 Calderon. Presents to ED as a transfer from [place] for ARF. She reports she began experiencing SOB around 5AM. Denies any f/c, cough, orthopnea, leg swelling. She has some SOB at baseline, uses 3L home O2 but reports this was worse than usual. She has a hx of recurrent PNA requiring hospitalization. She also reports 2-3 week histroy of generalized fatigue, n/v, and diarrhea. Having around 2 episodes of emesis per day, associated w/  meals, difficulty tolerating PO intake, also having multiple episodes of loose watery stools daily. Denies any blood in the vomit/stool. Denies chest pain, abdominal pain, dysuria. Reports she is urinating less often and smaller volume. She has not seen Dr. Calderon since last December however she reports compliance w/ her Actemra and Ofev. No longer taking prednisone daily since that time. She denies any recent sick contacts. Last hospitalized 1 month ago w/ PNA.      At outside facility, work up showed elevated WBC 15, BUN/Crt 54/9, no electrolyte abnormalities. Troponin, BNP WNL. COVID negative. UA showed WBCs and bacteria. She was given 1L NS and Rocephin x1, trasnfered to [facility] for ARF. In ED, /75, afebrile, O2 100% on 3L. On my interview, she is alert and oriented x3, in no distress, somewhat lethargic. Answers all questions appropriately. Reports her SOB has since resolved and has no acute complaints. Repeat labs here show increase in WBC to 19, BUN/Crt 55/9, AST 71, . CXR shows increased interstitial markings but no obvious opacities. Renal US unremarkable. Medicine consulted for ARF.        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  Patient was started on dialysis has kidney function started to recover, DUYEN likely suspected to ATN, patient successfully underwent kidney biopsy on 07/26, awaiting  pathologies.  Patient's current kidney function is back to baseline, however has multiple electrolyte derangement likely secondary to proximal tubular dysfunction due to ATN, I anticipate electrolyte derangements will recover in few days to weeks.  In the interim to give her p.o. potassium and magnesium supplementation.  , will follow-up closely outpatient with labs prior in about a week.  Patient otherwise  clinically and hemodynamically stable to be discharged home.  Explained in detail in regard to current plan, patient verbalized understanding.  Per patient request provided information of the rheumatologist's in town.          Discharge physical exam:  Vitals:    07/28/22 0739   BP: 116/77   Pulse: 91   Resp:    Temp: 98.2 °F (36.8 °C)       General: NAD   HEENT: Atraumatic, Normocephalic. No scleral icterus.   Neck: supple. No JVD   Pulm: CTAB. No wheezes. No crackles. Symmetrical chest expansion.  Cardio: Regular rate. Regular rhythm. No murmurs/gallops.   Abd: +BS, soft, non-distended, non-tender to palpation.   Extremities: good 2+ pulses in all extremities. No LE edema.   MSK: No obvious deformities. Moves all extremities purposely.   Neuro: Alert, responsive. Oriented x 3. Responds to questions appropriately. Follows simple commands.         TIME SPENT ON DISCHARGE: 60 minutes    Discharge Medications        Medication List      START taking these medications    magnesium oxide 400 mg (241.3 mg magnesium) tablet  Commonly known as: MAG-OX  Take 1 tablet (400 mg total) by mouth once daily. for 14 days        CHANGE how you take these medications    potassium chloride SA 20 MEQ tablet  Commonly known as: K-DUR,KLOR-CON  Take 1 tablet (20 mEq total) by mouth once daily. for 14 days  What changed: See the new instructions.        CONTINUE taking these medications    * albuterol 90 mcg/actuation inhaler  Commonly known as: PROVENTIL/VENTOLIN HFA     * albuterol 2.5 mg /3 mL (0.083 %) nebulizer solution  Commonly known as: PROVENTIL     * albuterol 2.5 mg /3 mL (0.083 %) nebulizer solution  Commonly known as: PROVENTIL     * albuterol 90 mcg/actuation inhaler  Commonly known as: PROVENTIL/VENTOLIN HFA     albuterol-ipratropium 2.5 mg-0.5 mg/3 mL nebulizer solution  Commonly known as: DUO-NEB     amLODIPine 10 MG tablet  Commonly known as: NORVASC     budesonide-formoterol 160-4.5 mcg 160-4.5 mcg/actuation  Hfaa  Commonly known as: SYMBICORT     ergocalciferol 50,000 unit Cap  Commonly known as: ERGOCALCIFEROL     ezetimibe 10 mg tablet  Commonly known as: ZETIA     gabapentin 300 MG capsule  Commonly known as: NEURONTIN     hydrOXYchloroQUINE 200 mg tablet  Commonly known as: PLAQUENIL     loratadine 10 mg tablet  Commonly known as: CLARITIN     meloxicam 15 MG tablet  Commonly known as: MOBIC     metoprolol tartrate 100 MG tablet  Commonly known as: LOPRESSOR     OFEV 150 mg Cap  Generic drug: nintedanib     omeprazole 20 MG capsule  Commonly known as: PRILOSEC     ondansetron 4 MG Tbdl  Commonly known as: ZOFRAN-ODT     OXYGEN-AIR DELIVERY SYSTEMS MISC     predniSONE 10 MG tablet  Commonly known as: DELTASONE     tocilizumab 162 mg/0.9 mL injection  Commonly known as: ACTEMRA         * This list has 4 medication(s) that are the same as other medications prescribed for you. Read the directions carefully, and ask your doctor or other care provider to review them with you.            STOP taking these medications    ciprofloxacin HCl 500 MG tablet  Commonly known as: CIPRO     furosemide 40 MG tablet  Commonly known as: LASIX     methylPREDNISolone 4 mg tablet  Commonly known as: MEDROL DOSEPACK        ASK your doctor about these medications    diclofenac sodium 1 % Gel  Commonly known as: VOLTAREN           Where to Get Your Medications      These medications were sent to [address] [address]    Phone: [phone]   · magnesium oxide 400 mg (241.3 mg magnesium) tablet  · potassium chloride SA 20 MEQ tablet         Discharge Information:     Patient clinically and hemodynamically stable to be discharged home.  To follow up outpatient in Internal Medicine Clinic, Nephrology Clinic.  Appointment for internal medicine clinic on 08/08 at 12:45 p.m..  To get labs prior to Next item clinic appointment.  Medications reconciled  refilled and discussed with patient.      Karen Lopez MD   [facility] PGY-3 Resident   [facility]

## 2022-07-29 ENCOUNTER — PATIENT OUTREACH (OUTPATIENT)
Dept: ADMINISTRATIVE | Facility: CLINIC | Age: 47
End: 2022-07-29
Payer: MEDICARE

## 2022-07-29 LAB
AR ANA INTERPRETIVE COMMENT: NORMAL
AR ANTINUCLEAR ANTIBODY (ANA), HEP-2, IGG: NORMAL
AR CYTOPLASM PATTERN: ABNORMAL
AR CYTOPLASMIC TITER: ABNORMAL
ESTROGEN SERPL-MCNC: NORMAL PG/ML
INSULIN SERPL-ACNC: NORMAL U[IU]/ML
LAB AP CLINICAL INFORMATION: NORMAL
LAB AP GROSS DESCRIPTION: NORMAL
LAB AP REPORT FOOTNOTES: NORMAL
MAYO GENERIC ORDERABLE RESULT: ABNORMAL
T3RU NFR SERPL: NORMAL %

## 2022-07-29 NOTE — PT/OT/SLP DISCHARGE
Occupational Therapy Discharge Summary    Ofelia Brady  MRN: [id number]   Principal Problem: DUYEN (acute kidney injury)      Patient Discharged from acute Occupational Therapy.  Please refer to prior OT note for functional status.    Assessment:      Patient has not met goals.    Objective:     GOALS:   Multidisciplinary Problems     Occupational Therapy Goals        Problem: Occupational Therapy    Goal Priority Disciplines Outcome Interventions   Occupational Therapy Goal     OT, PT/OT Ongoing, Progressing    Description: Patient will perform grooming with standby assist while standing at sink- d/c goal as pt refused services     Patient will perform toileting with mod I using toilet or BSC.-d/c goal as pt refused services       Patient will perform toilet transfer with SBA with use of grab bars or RW.-d/c goal as pt refused services       Patient will perform upper body dressing with setup assist while seated EOB.-d/c goal as pt refused services        Patient will perform lower body dressing with min assist to don socks/shoes while seated EOB. -d/c goal as pt refused services                        Reasons for Discontinuation of Therapy Services  Transfer to alternate level of care.      Plan:     Patient Discharged to: Home with Home Health Service    7/29/2022

## 2022-07-29 NOTE — PROGRESS NOTES
C3 nurse spoke with Ofelia Brady for a TCC post hospital discharge follow up call. The patient has a scheduled HOSFU appointment with [facility] Internal med clinic on 8/8/22 @ 12:30.

## 2022-08-08 DIAGNOSIS — J84.9 INTERSTITIAL LUNG DISEASE: ICD-10-CM

## 2022-08-08 DIAGNOSIS — M06.9 RHEUMATOID ARTHRITIS, INVOLVING UNSPECIFIED SITE, UNSPECIFIED WHETHER RHEUMATOID FACTOR PRESENT: Primary | ICD-10-CM

## 2022-08-09 ENCOUNTER — TELEPHONE (OUTPATIENT)
Dept: ADMINISTRATIVE | Facility: HOSPITAL | Age: 47
End: 2022-08-09
Payer: MEDICARE

## 2022-08-09 NOTE — TELEPHONE ENCOUNTER
----- Message from Sarai Valles RN sent at 8/8/2022  1:35 PM CDT -----  Regarding: Call to schedule  Alina,  Please call pt to schedule next available appt.  Also, please note her name and if there is a cancellation please call her to see if she can come in.    Thank you,  Sarai

## 2022-08-12 DIAGNOSIS — J84.170 INTERSTITIAL LUNG DISEASE WITH PROGRESSIVE FIBROTIC PHENOTYPE IN DISEASES CLASSIFIED ELSEWHERE: Primary | ICD-10-CM

## 2022-08-12 NOTE — TELEPHONE ENCOUNTER
----- Message from Alina Boyce sent at 8/11/2022  2:45 PM CDT -----  Regarding: Medication Management  Pt is need of Rx FUROSEMIDE 40mg and  PREDNISONE 10mg. Pt needs it sent to [facility] .    Thank You

## 2022-08-15 RX ORDER — NINTEDANIB 150 MG/1
150 CAPSULE ORAL EVERY 12 HOURS
Qty: 180 CAPSULE | Refills: 1 | Status: SHIPPED | OUTPATIENT
Start: 2022-08-15 | End: 2022-09-14 | Stop reason: SDUPTHER

## 2022-08-15 NOTE — TELEPHONE ENCOUNTER
Call 8/12/2022  After reviewing chart called pt to f/u  Explained pt was supposed to taper and stop Prednisone and to please f/u w/PCP on Lasix    Refills of Ofev and Actemra submitted/pended  Routed to Dr. Newby

## 2022-08-18 DIAGNOSIS — J84.9 INTERSTITIAL LUNG DISEASE: ICD-10-CM

## 2022-08-18 DIAGNOSIS — M06.9 RHEUMATOID ARTHRITIS, INVOLVING UNSPECIFIED SITE, UNSPECIFIED WHETHER RHEUMATOID FACTOR PRESENT: ICD-10-CM

## 2022-08-18 RX ORDER — TOCILIZUMAB 180 MG/ML
INJECTION, SOLUTION SUBCUTANEOUS
Qty: 10.8 EACH | Refills: 1 | Status: SHIPPED | OUTPATIENT
Start: 2022-08-18 | End: 2022-11-18 | Stop reason: SDUPTHER

## 2022-08-24 ENCOUNTER — TELEPHONE (OUTPATIENT)
Dept: RHEUMATOLOGY | Facility: CLINIC | Age: 47
End: 2022-08-24
Payer: MEDICARE

## 2022-08-24 NOTE — TELEPHONE ENCOUNTER
----- Message from Yvette Frias sent at 8/24/2022  2:18 PM CDT -----  Kady with [facility] received script Actemra syringe but she states pt has been receiving pens in the past . Is it ok to change to pens.    [phone]

## 2022-08-24 NOTE — TELEPHONE ENCOUNTER
Spoke to Keiko. She states that there is no difference in dosage or insurance coverage. I said that it's fine to disperse the pens.

## 2022-09-01 ENCOUNTER — TELEPHONE (OUTPATIENT)
Dept: RHEUMATOLOGY | Facility: CLINIC | Age: 47
End: 2022-09-01
Payer: MEDICARE

## 2022-09-08 ENCOUNTER — TELEPHONE (OUTPATIENT)
Dept: PULMONOLOGY | Facility: CLINIC | Age: 47
End: 2022-09-08
Payer: MEDICARE

## 2022-09-14 ENCOUNTER — OFFICE VISIT (OUTPATIENT)
Dept: RHEUMATOLOGY | Facility: CLINIC | Age: 47
End: 2022-09-14
Payer: MEDICARE

## 2022-09-14 VITALS
TEMPERATURE: 99 F | WEIGHT: 169.38 LBS | RESPIRATION RATE: 20 BRPM | OXYGEN SATURATION: 90 % | HEART RATE: 93 BPM | BODY MASS INDEX: 39.2 KG/M2 | DIASTOLIC BLOOD PRESSURE: 92 MMHG | HEIGHT: 55 IN | SYSTOLIC BLOOD PRESSURE: 158 MMHG

## 2022-09-14 DIAGNOSIS — J84.9 ILD (INTERSTITIAL LUNG DISEASE): ICD-10-CM

## 2022-09-14 DIAGNOSIS — J84.170 INTERSTITIAL LUNG DISEASE WITH PROGRESSIVE FIBROTIC PHENOTYPE IN DISEASES CLASSIFIED ELSEWHERE: ICD-10-CM

## 2022-09-14 DIAGNOSIS — Z99.81 OXYGEN DEPENDENT: ICD-10-CM

## 2022-09-14 DIAGNOSIS — D89.89 ANTISYNTHETASE SYNDROME: Primary | Chronic | ICD-10-CM

## 2022-09-14 DIAGNOSIS — M05.9 SEROPOSITIVE RHEUMATOID ARTHRITIS: ICD-10-CM

## 2022-09-14 DIAGNOSIS — M79.7 FIBROMYALGIA SYNDROME: ICD-10-CM

## 2022-09-14 PROCEDURE — 1159F PR MEDICATION LIST DOCUMENTED IN MEDICAL RECORD: ICD-10-PCS | Mod: CPTII,,, | Performed by: INTERNAL MEDICINE

## 2022-09-14 PROCEDURE — 3008F BODY MASS INDEX DOCD: CPT | Mod: CPTII,,, | Performed by: INTERNAL MEDICINE

## 2022-09-14 PROCEDURE — 1159F MED LIST DOCD IN RCRD: CPT | Mod: CPTII,,, | Performed by: INTERNAL MEDICINE

## 2022-09-14 PROCEDURE — 3080F PR MOST RECENT DIASTOLIC BLOOD PRESSURE >= 90 MM HG: ICD-10-PCS | Mod: CPTII,,, | Performed by: INTERNAL MEDICINE

## 2022-09-14 PROCEDURE — 99204 OFFICE O/P NEW MOD 45 MIN: CPT | Mod: S$PBB,,, | Performed by: INTERNAL MEDICINE

## 2022-09-14 PROCEDURE — 3077F SYST BP >= 140 MM HG: CPT | Mod: CPTII,,, | Performed by: INTERNAL MEDICINE

## 2022-09-14 PROCEDURE — 3080F DIAST BP >= 90 MM HG: CPT | Mod: CPTII,,, | Performed by: INTERNAL MEDICINE

## 2022-09-14 PROCEDURE — 99214 OFFICE O/P EST MOD 30 MIN: CPT | Mod: PBBFAC | Performed by: INTERNAL MEDICINE

## 2022-09-14 PROCEDURE — 3077F PR MOST RECENT SYSTOLIC BLOOD PRESSURE >= 140 MM HG: ICD-10-PCS | Mod: CPTII,,, | Performed by: INTERNAL MEDICINE

## 2022-09-14 PROCEDURE — 99204 PR OFFICE/OUTPT VISIT, NEW, LEVL IV, 45-59 MIN: ICD-10-PCS | Mod: S$PBB,,, | Performed by: INTERNAL MEDICINE

## 2022-09-14 PROCEDURE — 3008F PR BODY MASS INDEX (BMI) DOCUMENTED: ICD-10-PCS | Mod: CPTII,,, | Performed by: INTERNAL MEDICINE

## 2022-09-14 RX ORDER — ASPIRIN 81 MG/1
81 TABLET ORAL DAILY
Qty: 30 TABLET | Refills: 5 | Status: SHIPPED | OUTPATIENT
Start: 2022-09-14 | End: 2024-03-26

## 2022-09-14 RX ORDER — HYDROXYCHLOROQUINE SULFATE 200 MG/1
200 TABLET, FILM COATED ORAL 2 TIMES DAILY
Qty: 60 TABLET | Refills: 5 | Status: SHIPPED | OUTPATIENT
Start: 2022-09-14 | End: 2023-04-04

## 2022-09-14 RX ORDER — NIFEDIPINE 30 MG/1
30 TABLET, EXTENDED RELEASE ORAL DAILY
Qty: 30 TABLET | Refills: 11 | Status: SHIPPED | OUTPATIENT
Start: 2022-09-14 | End: 2024-03-26

## 2022-09-14 RX ORDER — NINTEDANIB 150 MG/1
150 CAPSULE ORAL EVERY 12 HOURS
Qty: 180 CAPSULE | Refills: 1 | Status: SHIPPED | OUTPATIENT
Start: 2022-09-14 | End: 2022-10-03 | Stop reason: SDUPTHER

## 2022-09-14 RX ORDER — MELOXICAM 15 MG/1
15 TABLET ORAL DAILY
Qty: 30 TABLET | Refills: 5 | Status: SHIPPED | OUTPATIENT
Start: 2022-09-14 | End: 2023-11-03 | Stop reason: ALTCHOICE

## 2022-09-14 RX ORDER — GABAPENTIN 300 MG/1
300 CAPSULE ORAL 2 TIMES DAILY
Qty: 60 CAPSULE | Refills: 5 | Status: SHIPPED | OUTPATIENT
Start: 2022-09-14

## 2022-09-14 NOTE — PROGRESS NOTES
"Subjective:       Patient ID: Ofelia Brady is a 47 y.o. female.    Chief Complaint: Shortness of Breath and right knee pain    Pt  is complaining of joint pain involving his MCP PIP wrist elbow shoulders hips knees and ankles bilaterally.  Is 8/10 in intensity dull in quality and continuous.  It is associated with a morning stiffness lasting for more than 60 minutes. Pt reports having difficulty maintaining a good night of sleep and this has been associated with myalgia of 8/10 in intensity.  This pain is dull continuous and gets worse mainly at night.  It is associated with fatigue.  Oxygen dependent , uses a walker with seat and wheels to ambulate       Review of Systems   Constitutional:  Negative for appetite change, chills and fever.   HENT:  Negative for congestion, ear pain, mouth sores, nosebleeds and trouble swallowing.    Eyes:  Negative for photophobia and discharge.   Respiratory:  Positive for chest tightness and shortness of breath.         Oxygen dependent , uses a walker with seat and wheels to ambulate    Cardiovascular:  Negative for chest pain.   Gastrointestinal:  Negative for abdominal pain and vomiting.   Endocrine: Negative.    Genitourinary:  Negative for hematuria.   Musculoskeletal:         As per HPI   Skin:  Negative for rash.   Neurological:  Negative for weakness.       Objective:   BP (!) 158/92 (BP Location: Left arm, Patient Position: Sitting, BP Method: Large (Automatic))   Pulse 93   Temp 98.7 °F (37.1 °C) (Oral)   Resp 20   Ht 4' 7" (1.397 m)   Wt 76.8 kg (169 lb 6.4 oz)   SpO2 (!) 90%   BMI 39.37 kg/m²      Physical Exam   Constitutional: She is oriented to person, place, and time. She appears well-developed and well-nourished. No distress.   HENT:   Head: Normocephalic and atraumatic.   Right Ear: External ear normal.   Left Ear: External ear normal.   Eyes: Pupils are equal, round, and reactive to light.   Cardiovascular: Normal rate, regular rhythm and normal " heart sounds.   Pulmonary/Chest: She has rhonchi.   Abdominal: Soft. There is no abdominal tenderness.   Musculoskeletal:      Right shoulder: Normal.      Left shoulder: Normal.      Right elbow: Normal.      Left elbow: Normal.      Right wrist: Normal.      Left wrist: Normal.      Cervical back: Neck supple.      Right hip: Normal.      Left hip: Normal.      Right knee: Normal.      Left knee: Normal.   Lymphadenopathy:     She has no cervical adenopathy.   Neurological: She is alert and oriented to person, place, and time. She displays normal reflexes. No cranial nerve deficit or sensory deficit. She exhibits normal muscle tone. Coordination normal.   Skin: No rash noted. No erythema.   Vitals reviewed.      Right Side Rheumatological Exam     Examination finds the shoulder, elbow, wrist, knee, 1st PIP, 1st MCP, 2nd PIP, 2nd MCP, 3rd PIP, 3rd MCP, 4th PIP, 4th MCP, 5th PIP, 5th MCP, temporomandibular, hip, ankle, 1st MTP, 2nd MTP, 3rd MTP, 4th MTP and 5th MTP normal.    Left Side Rheumatological Exam     Examination finds the shoulder, elbow, wrist, knee, 1st PIP, 1st MCP, 2nd PIP, 2nd MCP, 3rd PIP, 3rd MCP, 4th PIP, 4th MCP, 5th PIP, 5th MCP, temporomandibular, hip, ankle, 1st MTP, 2nd MTP, 3rd MTP, 4th MTP and 5th MTP normal.       Completed Fibromyalgia exam 2/18 tender points.  No data to display     Assessment:       1. Antisynthetase syndrome    2. ILD (interstitial lung disease)    3. Seropositive rheumatoid arthritis    4. Fibromyalgia syndrome    5. Oxygen dependent    6. Interstitial lung disease with progressive fibrotic phenotype in diseases classified elsewhere              Plan:       Problem List Items Addressed This Visit          Pulmonary    ILD (interstitial lung disease)    Relevant Medications    gabapentin (NEURONTIN) 300 MG capsule    hydrOXYchloroQUINE (PLAQUENIL) 200 mg tablet    meloxicam (MOBIC) 15 MG tablet    nintedanib (OFEV) 150 mg Cap    NIFEdipine (PROCARDIA-XL) 30 MG (OSM) 24  hr tablet    aspirin (ECOTRIN) 81 MG EC tablet       Immunology/Multi System    Antisynthetase syndrome - Primary (Chronic)    Relevant Medications    gabapentin (NEURONTIN) 300 MG capsule    hydrOXYchloroQUINE (PLAQUENIL) 200 mg tablet    meloxicam (MOBIC) 15 MG tablet    nintedanib (OFEV) 150 mg Cap    NIFEdipine (PROCARDIA-XL) 30 MG (OSM) 24 hr tablet    aspirin (ECOTRIN) 81 MG EC tablet     Other Visit Diagnoses       Seropositive rheumatoid arthritis        Relevant Medications    gabapentin (NEURONTIN) 300 MG capsule    hydrOXYchloroQUINE (PLAQUENIL) 200 mg tablet    meloxicam (MOBIC) 15 MG tablet    nintedanib (OFEV) 150 mg Cap    NIFEdipine (PROCARDIA-XL) 30 MG (OSM) 24 hr tablet    aspirin (ECOTRIN) 81 MG EC tablet    Fibromyalgia syndrome        Relevant Medications    gabapentin (NEURONTIN) 300 MG capsule    hydrOXYchloroQUINE (PLAQUENIL) 200 mg tablet    meloxicam (MOBIC) 15 MG tablet    nintedanib (OFEV) 150 mg Cap    NIFEdipine (PROCARDIA-XL) 30 MG (OSM) 24 hr tablet    aspirin (ECOTRIN) 81 MG EC tablet    Oxygen dependent        Relevant Medications    gabapentin (NEURONTIN) 300 MG capsule    hydrOXYchloroQUINE (PLAQUENIL) 200 mg tablet    meloxicam (MOBIC) 15 MG tablet    nintedanib (OFEV) 150 mg Cap    NIFEdipine (PROCARDIA-XL) 30 MG (OSM) 24 hr tablet    aspirin (ECOTRIN) 81 MG EC tablet    Interstitial lung disease with progressive fibrotic phenotype in diseases classified elsewhere        Relevant Medications    gabapentin (NEURONTIN) 300 MG capsule    hydrOXYchloroQUINE (PLAQUENIL) 200 mg tablet    meloxicam (MOBIC) 15 MG tablet    nintedanib (OFEV) 150 mg Cap    NIFEdipine (PROCARDIA-XL) 30 MG (OSM) 24 hr tablet    aspirin (ECOTRIN) 81 MG EC tablet

## 2022-09-16 ENCOUNTER — OFFICE VISIT (OUTPATIENT)
Dept: NEPHROLOGY | Facility: CLINIC | Age: 47
End: 2022-09-16
Payer: MEDICARE

## 2022-09-16 VITALS
RESPIRATION RATE: 20 BRPM | WEIGHT: 168.44 LBS | OXYGEN SATURATION: 92 % | BODY MASS INDEX: 38.98 KG/M2 | TEMPERATURE: 98 F | HEIGHT: 55 IN | HEART RATE: 87 BPM | SYSTOLIC BLOOD PRESSURE: 136 MMHG | DIASTOLIC BLOOD PRESSURE: 85 MMHG

## 2022-09-16 DIAGNOSIS — J84.9 ILD (INTERSTITIAL LUNG DISEASE): Primary | ICD-10-CM

## 2022-09-16 DIAGNOSIS — N17.9 AKI (ACUTE KIDNEY INJURY): ICD-10-CM

## 2022-09-16 DIAGNOSIS — N18.2 CKD (CHRONIC KIDNEY DISEASE) STAGE 2, GFR 60-89 ML/MIN: Primary | ICD-10-CM

## 2022-09-16 DIAGNOSIS — J84.9 INTERSTITIAL PULMONARY DISEASE, UNSPECIFIED: ICD-10-CM

## 2022-09-16 DIAGNOSIS — R91.1 SOLITARY PULMONARY NODULE: ICD-10-CM

## 2022-09-16 PROCEDURE — 99215 OFFICE O/P EST HI 40 MIN: CPT | Mod: PBBFAC | Performed by: INTERNAL MEDICINE

## 2022-09-16 RX ORDER — FUROSEMIDE 20 MG/1
20 TABLET ORAL DAILY PRN
Qty: 90 TABLET | Refills: 2 | Status: SHIPPED | OUTPATIENT
Start: 2022-09-16 | End: 2024-03-26

## 2022-09-16 NOTE — PROGRESS NOTES
Nephrology   CLINIC NOTE    Patient Name: Ofelia Brady  YOB: 1975  Chief Complaint: Chronic Kidney Disease (Follow up)     PRESENTING HISTORY   History of Present Illness:  Ms. Ofelia Brady is a 47 y.o. 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 Pt referred s/p hospital admission for acute renal failure that required HD with recovery in renal function. Renal biopsy done of 2022 with no significant findings. Pt denies hematuria, dysuria, or decreased urinary frequency. She is concerned with her SOB but does follow pulmonology and rheumatology. Satting well on her home 3L NC. She also endorsed concern for LE edema as she was previously on lasix. This was d/c'd on her discharge due to her acute renal failure.     Review of Systems:  12 point review of symptoms negative unless otherwise stated above    PAST HISTORY:     Past Medical History:   Diagnosis Date    Antisynthetase syndrome     HTN (hypertension)     Interstitial lung disease     Obesity, unspecified     Rheumatoid arthritis, unspecified         Past Surgical History:   Procedure Laterality Date     SECTION      X5    RENAL BIOPSY         Family History   Problem Relation Age of Onset    Hypertension Mother     Diabetes Mother     Heart disease Mother     Hypertension Father     Diabetes Father     Heart disease Sister        Social History     Socioeconomic History    Marital status: Single   Tobacco Use    Smoking status: Never    Smokeless tobacco: Never   Substance and Sexual Activity    Alcohol use: Never    Drug use: Never    Sexual activity: Never       MEDICATIONS:     Current Outpatient Medications   Medication Instructions    ACTEMRA 162 mg/0.9 mL injection INJECT 0.9 MLS (162 MG TOTAL) INTO THE SKIN ONCE A WEEK.    albuterol (PROVENTIL) 2.5 mg /3 mL (0.083 %) nebulizer solution 3 mLs, Nebulization, Every  "8 hours PRN    albuterol (PROVENTIL/VENTOLIN HFA) 90 mcg/actuation inhaler 2 puffs, Inhalation, Every 6 hours    albuterol-ipratropium (DUO-NEB) 2.5 mg-0.5 mg/3 mL nebulizer solution ipratropium 0.5 mg-albuterol 3 mg (2.5 mg base)/3 mL nebulization soln    amLODIPine (NORVASC) 10 mg, Oral, Daily    aspirin (ECOTRIN) 81 mg, Oral, Daily, After lunch    budesonide-formoterol 160-4.5 mcg (SYMBICORT) 160-4.5 mcg/actuation HFAA 1 puff, Inhalation, Daily PRN    diclofenac sodium (VOLTAREN) 1 % Gel 1 Tube, Topical (Top), Every 6 hours PRN    ergocalciferol (ERGOCALCIFEROL) 50,000 unit Cap 1 capsule, Oral, Daily    ezetimibe (ZETIA) 10 mg, Oral, Nightly    fluticasone propionate (FLOVENT DISKUS) 250 mcg/actuation DsDv 1 puff    furosemide (LASIX) 40 MG tablet 1 tablet    gabapentin (NEURONTIN) 300 mg, Oral, 2 times daily    hydrOXYchloroQUINE (PLAQUENIL) 200 mg, Oral, 2 times daily    ibuprofen (ADVIL,MOTRIN) 800 MG tablet 1 tablet with food or milk as needed    loratadine (CLARITIN) 10 mg, Oral, Daily    meloxicam (MOBIC) 15 mg, Oral, Daily, After lunch    metoprolol tartrate (LOPRESSOR) 100 mg, Oral, Daily    NIFEdipine (PROCARDIA-XL) 30 mg, Oral, Daily    OFEV 150 mg, Oral, Every 12 hours, Take with food    omeprazole (PRILOSEC) 20 mg, Oral, Daily    ondansetron (ZOFRAN-ODT) 4 mg, Oral, Every 6 hours PRN    OXYGEN-AIR DELIVERY SYSTEMS MISC Inhalation        OBJECTIVE:   Vital Signs:  Vitals:    09/16/22 1119   BP: 136/85   Pulse: 87   Resp: 20   Temp: 98.4 °F (36.9 °C)   TempSrc: Oral   SpO2: (!) 92%   Weight: 76.4 kg (168 lb 6.9 oz)   Height: 4' 7" (1.397 m)        Physical Exam:  General: Awake, alert, & oriented to person, place & time. No acute distress. In wheelchair  Psychiatric: Mood and affect normal  HEENT: Normocephalic, atraumatic. Face symmetric.  Cardiovascular: Regular rate & rhythm. Normal S1 & S2 w/out murmurs, rubs or gallops.  No JVD appreciated.  2+ pulses throughout.  Pulmonary: Bilateral symmetric " chest rise. Non-labored, no wheezes, rhonchi or crackles are appreciated. On 3L NC.  Abdominal:  Soft, nontender, nondistended. Bowel sounds present  Extremities: No clubbing, cyanosis or edema.  Skin:  Exposed skin is warm & dry.  Neuro:   Patient moves all extremities equally. Sensation intact bilateraly.      Laboratory  Lab Results   Component Value Date     (H) 07/28/2022    K 3.4 (L) 07/28/2022    CO2 29 07/28/2022    BUN 7.8 07/28/2022    CREATININE 1.07 (H) 07/28/2022    CALCIUM 7.4 (L) 07/28/2022    BILIDIR 0.2 07/15/2021    IBILI 0.30 07/15/2021    ALKPHOS 62 07/28/2022    AST 27 07/28/2022    ALT 37 07/28/2022    MG 1.40 (L) 07/28/2022    PHOS 3.7 07/28/2022        Lab Results   Component Value Date    WBC 16.4 (H) 07/28/2022    RBC 4.10 (L) 07/28/2022    HGB 11.1 (L) 07/28/2022    HCT 34.5 (L) 07/28/2022    MCV 84.1 07/28/2022    MCH 27.1 07/28/2022    MCHC 32.2 (L) 07/28/2022    RDW 16.1 07/28/2022     07/28/2022    MPV 10.8 (H) 07/28/2022          ASSESSMENT & PLAN:     DUYEN - resolved   Underlying stage 2 CKD  -last labs were from discharge on 7/28/2022  -BUN/Cr of 7.8/10.7  -renal biopsy limited but no significant findings or concern for immune-complex mediated process  -ordered CBC, CMP, PTH intact, UA, urine pr/cr ratio, Vit D, Mag, and phos     -Follow low sodium diet (2 grams a day)  -Control diabetes (goal A1C <7.0%)  -Control high blood pressure ( goal BP < 130/80, please record BP at home every day and bring log to next office visit)  -Exercise at least 30 minutes a day, 5 days a week.  -Maintain healthy weight.  -Decrease or stop alcohol use  -Do not smoke  -Stay well hydrated  -Receive Pneumovax, Flu, and HBV vaccines if indicated.  -Do not take NSAIDs (Ibuprofen, Naproxen, Aleve, Advil, Toradol, Mobic), may take only Tylenol as needed for pain/headaches.  -Take cholesterol-lowering medications as prescribed (LDL goal <100)    HTN  -well-controlled today  -continue current BP  medications    Lower extremity edema  SOB  -likely due to underlying autoimmune disease   -ordered lasix 20mg PRN for worsening SOB and/or swelling  -discussed taking in moderation as to not affect renal function.   -continue to follow with pulmonology and rheumatology     RTC in 3 months with labs.     Jovanna Fan MD  PGY-3, Internal Medicine    Attending addenum to follow:

## 2022-10-03 DIAGNOSIS — J84.170 INTERSTITIAL LUNG DISEASE WITH PROGRESSIVE FIBROTIC PHENOTYPE IN DISEASES CLASSIFIED ELSEWHERE: ICD-10-CM

## 2022-10-03 DIAGNOSIS — J84.9 ILD (INTERSTITIAL LUNG DISEASE): ICD-10-CM

## 2022-10-03 DIAGNOSIS — M79.7 FIBROMYALGIA SYNDROME: ICD-10-CM

## 2022-10-03 DIAGNOSIS — D89.89 ANTISYNTHETASE SYNDROME: Chronic | ICD-10-CM

## 2022-10-03 DIAGNOSIS — M05.9 SEROPOSITIVE RHEUMATOID ARTHRITIS: ICD-10-CM

## 2022-10-03 DIAGNOSIS — Z99.81 OXYGEN DEPENDENT: ICD-10-CM

## 2022-10-03 RX ORDER — NINTEDANIB 150 MG/1
150 CAPSULE ORAL EVERY 12 HOURS
Qty: 180 CAPSULE | Refills: 1 | Status: SHIPPED | OUTPATIENT
Start: 2022-10-03 | End: 2022-11-18 | Stop reason: SDUPTHER

## 2022-10-19 ENCOUNTER — HOSPITAL ENCOUNTER (OUTPATIENT)
Dept: RADIOLOGY | Facility: HOSPITAL | Age: 47
Discharge: HOME | End: 2022-10-19
Attending: INTERNAL MEDICINE
Payer: MEDICARE

## 2022-10-19 DIAGNOSIS — J84.9 INTERSTITIAL PULMONARY DISEASE, UNSPECIFIED: ICD-10-CM

## 2022-10-19 DIAGNOSIS — J84.9 ILD (INTERSTITIAL LUNG DISEASE): ICD-10-CM

## 2022-10-19 PROCEDURE — 71250 CT THORAX DX C-: CPT | Mod: TC

## 2022-10-27 ENCOUNTER — TELEPHONE (OUTPATIENT)
Dept: PULMONOLOGY | Facility: CLINIC | Age: 47
End: 2022-10-27
Payer: MEDICARE

## 2022-11-01 ENCOUNTER — OFFICE VISIT (OUTPATIENT)
Dept: PULMONOLOGY | Facility: CLINIC | Age: 47
End: 2022-11-01
Payer: MEDICARE

## 2022-11-01 VITALS
BODY MASS INDEX: 38.69 KG/M2 | RESPIRATION RATE: 20 BRPM | OXYGEN SATURATION: 95 % | TEMPERATURE: 98 F | HEIGHT: 55 IN | DIASTOLIC BLOOD PRESSURE: 84 MMHG | SYSTOLIC BLOOD PRESSURE: 127 MMHG | WEIGHT: 167.19 LBS | HEART RATE: 87 BPM

## 2022-11-01 DIAGNOSIS — J84.9 INTERSTITIAL LUNG DISEASE: Primary | ICD-10-CM

## 2022-11-01 DIAGNOSIS — Z99.81 DEPENDENCE ON CONTINUOUS SUPPLEMENTAL OXYGEN: ICD-10-CM

## 2022-11-01 PROCEDURE — 3066F NEPHROPATHY DOC TX: CPT | Mod: CPTII,,, | Performed by: INTERNAL MEDICINE

## 2022-11-01 PROCEDURE — 3074F SYST BP LT 130 MM HG: CPT | Mod: CPTII,,, | Performed by: INTERNAL MEDICINE

## 2022-11-01 PROCEDURE — 1160F PR REVIEW ALL MEDS BY PRESCRIBER/CLIN PHARMACIST DOCUMENTED: ICD-10-PCS | Mod: CPTII,,, | Performed by: INTERNAL MEDICINE

## 2022-11-01 PROCEDURE — 3008F BODY MASS INDEX DOCD: CPT | Mod: CPTII,,, | Performed by: INTERNAL MEDICINE

## 2022-11-01 PROCEDURE — 1159F MED LIST DOCD IN RCRD: CPT | Mod: CPTII,,, | Performed by: INTERNAL MEDICINE

## 2022-11-01 PROCEDURE — 1160F RVW MEDS BY RX/DR IN RCRD: CPT | Mod: CPTII,,, | Performed by: INTERNAL MEDICINE

## 2022-11-01 PROCEDURE — 3079F PR MOST RECENT DIASTOLIC BLOOD PRESSURE 80-89 MM HG: ICD-10-PCS | Mod: CPTII,,, | Performed by: INTERNAL MEDICINE

## 2022-11-01 PROCEDURE — G0008 ADMIN INFLUENZA VIRUS VAC: HCPCS | Mod: PBBFAC

## 2022-11-01 PROCEDURE — 99214 OFFICE O/P EST MOD 30 MIN: CPT | Mod: S$PBB,GC,, | Performed by: INTERNAL MEDICINE

## 2022-11-01 PROCEDURE — 99214 PR OFFICE/OUTPT VISIT, EST, LEVL IV, 30-39 MIN: ICD-10-PCS | Mod: S$PBB,GC,, | Performed by: INTERNAL MEDICINE

## 2022-11-01 PROCEDURE — 3079F DIAST BP 80-89 MM HG: CPT | Mod: CPTII,,, | Performed by: INTERNAL MEDICINE

## 2022-11-01 PROCEDURE — 1159F PR MEDICATION LIST DOCUMENTED IN MEDICAL RECORD: ICD-10-PCS | Mod: CPTII,,, | Performed by: INTERNAL MEDICINE

## 2022-11-01 PROCEDURE — 3074F PR MOST RECENT SYSTOLIC BLOOD PRESSURE < 130 MM HG: ICD-10-PCS | Mod: CPTII,,, | Performed by: INTERNAL MEDICINE

## 2022-11-01 PROCEDURE — 3066F PR DOCUMENTATION OF TREATMENT FOR NEPHROPATHY: ICD-10-PCS | Mod: CPTII,,, | Performed by: INTERNAL MEDICINE

## 2022-11-01 PROCEDURE — 3008F PR BODY MASS INDEX (BMI) DOCUMENTED: ICD-10-PCS | Mod: CPTII,,, | Performed by: INTERNAL MEDICINE

## 2022-11-01 PROCEDURE — 99215 OFFICE O/P EST HI 40 MIN: CPT | Mod: PBBFAC,25

## 2022-11-01 NOTE — PROGRESS NOTES
"[facility] PULMONARY MEDICINE  OUTPATIENT OFFICE VISIT NOTE    SUBJECTIVE:      HPI: Ofelia Brady is a 47 y.o. yo female w/ significant PMH of ILD, who presents today for F/U. She states having shortness of breath most of the time, especially upon awakening. Currently, she can only walk for less than 10 feet before needing a break; she continues to have need oxygen via NC at 2 L/min. No significant changes compared to last visit. She states at baseline at this time.     ROS:  (+) Shortness of breath  (-) Chest pain, palpitations, SOB, fever, night sweats, chills, diarrhea, constipation.       OBJECTIVE:     Vital signs:   /84 (BP Location: Left arm, Patient Position: Sitting)   Pulse 87   Temp 98.2 °F (36.8 °C) (Oral)   Resp 20   Ht 4' 7" (1.397 m)   Wt 75.8 kg (167 lb 3.2 oz)   SpO2 95%   BMI 38.86 kg/m²      Physical Examination:  General: Obese w/ respiratory distress  HEENT: NC/AT; PERRL; nasal and oral mucosa moist and clear  Neck: Full ROM; no lymphadenopathy  Pulm: Fine crackles bilaterally, more prominent in upper lobes; normal work of breathing on oxygen at 2L/min via NC  CV: S1, S2 w/o murmurs or gallops; no edema noted; mild cyanosis in the lips  GI: Soft with normal bowel sounds in all quadrants, no masses on palpation  MSK: Full ROM of all extremities and spine w/o limitation or discomfort  Derm: Rashes and dry skin in extremities   Neuro: AAOx4; motor/sensory function intact  Psych: Cooperative; appropriate mood and affect    Significant findings:  10/19/2022 - CT chest w/o contrast shows bilateral ground-glass interstitial infiltrate with associated interstitial fibrosis in the periphery of the lungs bilaterally; cystic changes are seen in the periphery of the lungs bilaterally  4/29/2021 - High res CT chest shows increased bilateral groundglass and reticular opacities.     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     Otilia Oliveros DO   \[facility]\"" Internal Medicine, PGY-1

## 2022-11-02 DIAGNOSIS — J84.9 ILD (INTERSTITIAL LUNG DISEASE): Primary | ICD-10-CM

## 2022-11-02 DIAGNOSIS — Z99.81 DEPENDENCE ON CONTINUOUS SUPPLEMENTAL OXYGEN: ICD-10-CM

## 2022-11-03 ENCOUNTER — TELEPHONE (OUTPATIENT)
Dept: TRANSPLANT | Facility: CLINIC | Age: 47
End: 2022-11-03
Payer: MEDICARE

## 2022-11-04 ENCOUNTER — TELEPHONE (OUTPATIENT)
Dept: TRANSPLANT | Facility: CLINIC | Age: 47
End: 2022-11-04

## 2022-11-04 ENCOUNTER — TELEPHONE (OUTPATIENT)
Dept: TRANSPLANT | Facility: CLINIC | Age: 47
End: 2022-11-04
Payer: MEDICARE

## 2022-11-04 NOTE — TELEPHONE ENCOUNTER
"2/2/23 - Message sent to [employer] regarding the status of financial clearance for consult on 2/1/23.  Received financial clearance for evaluation.  Contacted patient regarding the referral to our department.  Patient stated that she can't remember if she was referred to our program.  Inquired if she wanted to schedule an appointment.  Patient stated that she would like to schedule an appointment.  Notified her of the testing needed (PFTs, 6 minute walk test) and the clinic availability.  Patient requested an afternoon appointment on 2/23/23.  Informed her that the appointments will be scheduled and she will be contacted to confirm the date and times of the appointments.  She verbalized her understanding of all discussed.    11/4/22 - Message sent to [employer] for financial clearance for consult with PFTs and a 6 minute walk test.    11/7/22 - Received a request from [facility] for clinicals.  Clinicals sent to [employer].    ----- Message from Susan Oconnor sent at 11/4/2022  4:39 PM CDT -----  Regarding: FW: New LUT referral    ----- Message -----  From: Yenifer Quinonez MD  Sent: 11/4/2022   1:45 PM CDT  To: Susan Oconnor  Subject: RE: New LUT referral                             LUT routine     Testing: PFT, 6mwt    ----- Message -----  From: Susan Oconnor  Sent: 11/4/2022  12:56 AM CDT  To: Yenifer Quinonez MD  Subject: New LUT referral                                 Lung Transplant Referral Note     Referral from: Dr. Richard Kuhn    Lung diagnosis:  pulmonary fibrosis, anti-synthetase syndrome    Age: 47 y.o. female     Height/Weight/BMI:  4' 7" / 75.8 kg  / 38.86 kg/m² (note patient referral declined 5/2021 due to BMI 44)    Smoking history: Social History     Tobacco Use       Smoking status: never smoker       Smokeless tobacco: never user     Other Drug use: not noted    PFT date: not received 'patient unable' 1/21/21    6 MWT date: not done   Satted to 95% on 2L at rest. Does have portable " "Oxygen.       CXR date: 7/20/22   FINDINGS:  Right transjugular approach central venous catheter terminates within the right atrium.  There is no pneumothorax.  Otherwise, no significant interval change.     Impression:  Central venous catheter terminates within the right atrium.     Chest CT date: 10/19/22   Impression:      FINDINGS:  There is a diffuse bilateral ground-glass interstitial infiltrate with associated interstitial fibrosis in the periphery of the lungs bilaterally.  Some cystic changes are seen in the periphery of the lungs bilaterally.  Findings are consistent with patient's history of chronic interstitial lung disease and is unchanged since prior.  There is some bronchiectasis seen in the upper lobes bilaterally..  No mass is seen.  No lesion is seen.  No pleural effusion is seen.  No pleural thickening is seen.  The mediastinum appears grossly unremarkable.  No abnormal lymphadenopathy is seen.  Thoracic aorta appears grossly unremarkable.  Heart appears normal.     Visualized portions of the upper abdomen show no acute abnormality.     Impression:     Findings seen consistent with chronic interstitial lung disease bilaterally overall not significantly changed since 04/29/2021     Echo date: 7/20/22     Impression:   · The left ventricle is normal in size with normal systolic function.  · The estimated ejection fraction is 60%.  · Normal left ventricular diastolic function.  · Normal right ventricular size with low normal right ventricular systolic function.  · Moderate pulmonic regurgitation.  · Mild tricuspid regurgitation.  · There is moderate pulmonary hypertension.       Other pertinent medical history: on Actemera and OFEV, reports "cannot walk more than 10 feet without being short of breath and needing a break". Referred to wellness center locally for rehabilitation.    Recommendations?          "

## 2022-11-04 NOTE — TELEPHONE ENCOUNTER
Referral received from Dr. Richard Kuhn. Routed to provider for review. Updated primary transplant coordinator.

## 2022-11-04 NOTE — LETTER
November 4, 2022    Richard Kuhn Jr.  [address] [place] [address]  [address] [place] [address]  Phone: [phone]  Fax: [phone]          Dear Richard Kuhn Jr.:    Patient: Ofelia Brady   MR Number: [id number]   YOB: 1975     Thank you for the referral of Ofelia Brady to our lung transplant program. Your patients demographic and clinical information have been submitted for transplant provider review and financial clearance. Once the provider and insurance company has approved the consult for your patient, she will be contacted by our office re: the date for an appointment. We will update you once this initial appointment has been scheduled. You will receive an after-visit summary following the completion of your patients appointment in our clinic.    Thank you again for your trust in our program. If there is anything we can do for you or your staff, please feel free to contact us at [phone].    Sincerely,       Letitia Stubbs D.O.  Medical Director, Lung Transplant  Pulmonary & Critical Care Medicine    [facility]  [address] [place] [address]  [phone]

## 2022-11-11 ENCOUNTER — TELEPHONE (OUTPATIENT)
Dept: RHEUMATOLOGY | Facility: CLINIC | Age: 47
End: 2022-11-11
Payer: MEDICARE

## 2022-11-11 NOTE — TELEPHONE ENCOUNTER
Received fax notice from [facility] #[id number] that they haven't been able to reach patient to set up shipment of Actemra  Called pt to f/u  Explained to pt pharmacy won't ship the medicine w/out talking to her because it's sent overnight and has to go into the refrigerator as soon as it arrives  Provided pt w/the phone # for pharmacy  Verified pt has someone who will give her the shot-her  does it for her  Advised pt to call w/any questions or concerns and she agreed

## 2022-11-18 DIAGNOSIS — M79.7 FIBROMYALGIA SYNDROME: ICD-10-CM

## 2022-11-18 DIAGNOSIS — M05.9 SEROPOSITIVE RHEUMATOID ARTHRITIS: ICD-10-CM

## 2022-11-18 DIAGNOSIS — Z99.81 OXYGEN DEPENDENT: ICD-10-CM

## 2022-11-18 DIAGNOSIS — D89.89 ANTISYNTHETASE SYNDROME: Chronic | ICD-10-CM

## 2022-11-18 DIAGNOSIS — J84.9 ILD (INTERSTITIAL LUNG DISEASE): ICD-10-CM

## 2022-11-18 DIAGNOSIS — J84.170 INTERSTITIAL LUNG DISEASE WITH PROGRESSIVE FIBROTIC PHENOTYPE IN DISEASES CLASSIFIED ELSEWHERE: Primary | ICD-10-CM

## 2022-11-18 RX ORDER — TOCILIZUMAB 180 MG/ML
1 INJECTION, SOLUTION SUBCUTANEOUS
COMMUNITY
Start: 2022-11-11 | End: 2023-07-19 | Stop reason: SDUPTHER

## 2022-11-18 RX ORDER — NINTEDANIB 150 MG/1
150 CAPSULE ORAL EVERY 12 HOURS
Qty: 180 CAPSULE | Refills: 1 | Status: SHIPPED | OUTPATIENT
Start: 2022-11-18 | End: 2023-07-19 | Stop reason: SDUPTHER

## 2022-11-18 NOTE — TELEPHONE ENCOUNTER
Received fax for Ofev from [facility] #[id number]  Noted provider sent last rx to [facility]   Called pt to clarify preferred pharmacy   She stated [facility] #[id number] is where she gets this medication from.   Request was sent to provider for [facility].

## 2022-12-16 ENCOUNTER — TELEPHONE (OUTPATIENT)
Dept: NEPHROLOGY | Facility: CLINIC | Age: 47
End: 2022-12-16
Payer: MEDICARE

## 2022-12-16 DIAGNOSIS — J84.170 INTERSTITIAL LUNG DISEASE WITH PROGRESSIVE FIBROTIC PHENOTYPE IN DISEASES CLASSIFIED ELSEWHERE: ICD-10-CM

## 2022-12-16 DIAGNOSIS — Z99.81 OXYGEN DEPENDENT: ICD-10-CM

## 2022-12-16 DIAGNOSIS — N18.9 CHRONIC KIDNEY DISEASE, UNSPECIFIED CKD STAGE: Primary | ICD-10-CM

## 2022-12-16 NOTE — TELEPHONE ENCOUNTER
Called pt about appointment:  unable to come-transportation issues.  Patient requests refill:  nintedanib (OFEV), will inform Sarai in Rheumatology

## 2023-01-04 ENCOUNTER — TELEPHONE (OUTPATIENT)
Dept: PULMONOLOGY | Facility: CLINIC | Age: 48
End: 2023-01-04
Payer: MEDICARE

## 2023-01-04 ENCOUNTER — DOCUMENTATION ONLY (OUTPATIENT)
Dept: PULMONOLOGY | Facility: CLINIC | Age: 48
End: 2023-01-04
Payer: MEDICARE

## 2023-01-04 NOTE — TELEPHONE ENCOUNTER
----- Message from Arielle Lemus sent at 1/4/2023  9:09 AM CST -----  Patient needs refill on Ofev    Patient uses [facility] pharmacy, as listed on profile    Next appointment 8/1/2023 with pul clinic    [phone]    Call rcd [phone]

## 2023-01-04 NOTE — TELEPHONE ENCOUNTER
[facility]  Ofev 150mg capsules (1 po twice daily)  [place] Approval   PA Case ID-[id number]    Dates:12/31/2022-12/31/2023

## 2023-01-04 NOTE — TELEPHONE ENCOUNTER
New [place] approval for Ofev (#60/month) obtained through [facility] with expiration date 12/31/2023.  I notified Ms. Brady of the approval./tdr

## 2023-02-03 ENCOUNTER — TELEPHONE (OUTPATIENT)
Dept: RHEUMATOLOGY | Facility: CLINIC | Age: 48
End: 2023-02-03
Payer: MEDICARE

## 2023-02-03 ENCOUNTER — TELEPHONE (OUTPATIENT)
Dept: TRANSPLANT | Facility: CLINIC | Age: 48
End: 2023-02-03
Payer: MEDICARE

## 2023-02-03 NOTE — TELEPHONE ENCOUNTER
Called pt  I let her know [facility] been [facility] to [facility] regarding Actemra  She was unsure of getting a call.   I gave her the number to [facility] and she stated she will give them a call.

## 2023-02-09 DIAGNOSIS — Z76.82 LUNG TRANSPLANT CANDIDATE: ICD-10-CM

## 2023-02-09 DIAGNOSIS — D89.89 ANTISYNTHETASE SYNDROME: Primary | Chronic | ICD-10-CM

## 2023-02-09 DIAGNOSIS — J84.9 ILD (INTERSTITIAL LUNG DISEASE): ICD-10-CM

## 2023-02-22 ENCOUNTER — TELEPHONE (OUTPATIENT)
Dept: TRANSPLANT | Facility: CLINIC | Age: 48
End: 2023-02-22
Payer: MEDICARE

## 2023-02-23 ENCOUNTER — TELEPHONE (OUTPATIENT)
Dept: TRANSPLANT | Facility: CLINIC | Age: 48
End: 2023-02-23
Payer: MEDICARE

## 2023-02-23 NOTE — TELEPHONE ENCOUNTER
Called patient to verify if she is coming for appointments. She is requesting to be rescheduled, to please contact her at a later time to make appt. Request to .

## 2023-02-27 ENCOUNTER — TELEPHONE (OUTPATIENT)
Dept: TRANSPLANT | Facility: CLINIC | Age: 48
End: 2023-02-27
Payer: MEDICARE

## 2023-02-27 NOTE — LETTER
February 27, 2023    [address] [place] [address]          Dear Dr. Kuhn:    Patient: Ofelia Brady   MR Number: [id number]   YOB: 1975     We hope this letter finds you well. We wanted you to be aware that Ms. Brady missed her appointment in the lung transplant department that was scheduled for 2/23/23. As of this date, her appointment has not been rescheduled.  If you should have any questions regarding this communication, please do not hesitate to contact us at [phone].                                                                               Sincerely,     Letitia Stubbs D.O.  Medical Director, Lung Transplant  Pulmonary & Critical Care Medicine  [facility]  [address] [place] [phone]

## 2023-05-01 ENCOUNTER — TELEPHONE (OUTPATIENT)
Dept: NEPHROLOGY | Facility: CLINIC | Age: 48
End: 2023-05-01
Payer: MEDICARE

## 2023-05-04 ENCOUNTER — TELEPHONE (OUTPATIENT)
Dept: NEPHROLOGY | Facility: CLINIC | Age: 48
End: 2023-05-04
Payer: MEDICARE

## 2023-05-30 ENCOUNTER — TELEPHONE (OUTPATIENT)
Dept: NEPHROLOGY | Facility: CLINIC | Age: 48
End: 2023-05-30
Payer: MEDICARE

## 2023-05-30 NOTE — TELEPHONE ENCOUNTER
----- Message from Miriam Gomez sent at 5/30/2023  9:28 AM CDT -----  Regarding: Send lab orders to [facility]  Pt is becca 6/2 and would like her lab orders sent to [facility] in [place]. Pt can be reached @ [phone]          Thank You  Guillermo BISHOP

## 2023-05-31 ENCOUNTER — TELEPHONE (OUTPATIENT)
Dept: TRANSPLANT | Facility: CLINIC | Age: 48
End: 2023-05-31
Payer: MEDICARE

## 2023-05-31 DIAGNOSIS — D89.89 ANTISYNTHETASE SYNDROME: Primary | Chronic | ICD-10-CM

## 2023-05-31 DIAGNOSIS — J84.9 ILD (INTERSTITIAL LUNG DISEASE): ICD-10-CM

## 2023-05-31 DIAGNOSIS — Z76.82 LUNG TRANSPLANT CANDIDATE: ICD-10-CM

## 2023-05-31 RX ORDER — CIMETIDINE 800 MG
800 TABLET ORAL NIGHTLY
COMMUNITY
Start: 2023-05-15

## 2023-05-31 NOTE — TELEPHONE ENCOUNTER
"----- Message from Ted Ochoa sent at 5/31/2023  8:49 AM CDT -----  Regarding: call back  Pt's sister Pilar call to schedule appt requesting call back     Call     Contacted Pilar, patient's sister.  She stated, "I need to get my sister in for an appointment."  Informed her that a message will be sent to the provider to determine if any testing besides the PFTs and 6 minute walk test is needed. Also informed her that we will also need to obtain financial clearance for the appointment.  Inquired as to their availability.  Pilar stated that they are available any time, but they would need an afternoon appointment with the start time of testing @ 12 pm.  Informed her that she will be contacted with the date and times of the testing and appointment once scheduled.  She verbalized her understanding.    6/1/23 - Received a message from Cesar stating that patient's outpatient evaluation authorization is valid until 11/7/2023.    Appointment card given to  to schedule appointments.  "

## 2023-06-01 ENCOUNTER — TELEPHONE (OUTPATIENT)
Dept: TRANSPLANT | Facility: CLINIC | Age: 48
End: 2023-06-01
Payer: MEDICARE

## 2023-06-01 NOTE — TELEPHONE ENCOUNTER
----- Message from Grant Guerrero sent at 6/1/2023 12:08 PM CDT -----  Regarding: RE: financial clearance  Tolu Gallardo,    Patient is financially cleared for OP Evaluation. Her auth is valid until 11/7/2023.    Cesar  ----- Message -----  From: Paulina Marcano RN  Sent: 5/31/2023   4:29 PM CDT  To: Grant Guerrero  Subject: financial clearance                              Patient now wants to schedule an appointment.  Is she still financially cleared for an appointment?  She will need PFTs and a 6 minute walk.    Thanks,  sl

## 2023-06-02 ENCOUNTER — OFFICE VISIT (OUTPATIENT)
Dept: NEPHROLOGY | Facility: CLINIC | Age: 48
End: 2023-06-02
Payer: MEDICARE

## 2023-06-02 VITALS
TEMPERATURE: 98 F | SYSTOLIC BLOOD PRESSURE: 130 MMHG | HEIGHT: 55 IN | OXYGEN SATURATION: 100 % | BODY MASS INDEX: 37.54 KG/M2 | DIASTOLIC BLOOD PRESSURE: 83 MMHG | RESPIRATION RATE: 18 BRPM | WEIGHT: 162.19 LBS | HEART RATE: 99 BPM

## 2023-06-02 DIAGNOSIS — I10 HYPERTENSION, UNSPECIFIED TYPE: ICD-10-CM

## 2023-06-02 DIAGNOSIS — E83.42 HYPOMAGNESEMIA: ICD-10-CM

## 2023-06-02 DIAGNOSIS — E55.9 VITAMIN D DEFICIENCY, UNSPECIFIED: ICD-10-CM

## 2023-06-02 DIAGNOSIS — N17.9 ACUTE KIDNEY INJURY: Primary | ICD-10-CM

## 2023-06-02 PROCEDURE — 99215 OFFICE O/P EST HI 40 MIN: CPT | Mod: PBBFAC | Performed by: INTERNAL MEDICINE

## 2023-06-02 NOTE — PROGRESS NOTES
Nephrology   CLINIC NOTE    Patient Name: Ofelia Brady  YOB: 1975  Chief Complaint: Chronic Kidney Disease (RTC, tok meds, 2L O2, c/o knee pain)     PRESENTING HISTORY   History of Present Illness:  Ms. Ofelia Brady is a 48 y.o. 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 The patient was last seen on 22 for an initial visit. Pt initially referred s/p hospital admission for acute renal failure that required HD with recovery in renal function. Renal biopsy done of 2022 with no significant findings. At that time, she was complaining of lower extremity and SOB. Since then, she has had initial renal work-up done. Labs show PTH 52, vitamin D 22.3, UA showing trace protein, BUN 11, GFR 92 and Cr 0.8.  Today, patient states she feels much improved form her lower extremity edema. Her only complaint is her chronic joint pain. Blood pressure noted to be 130/80 in clinic. Admits that her systolic blood pressure typically runs 130-140s. Currently on Lasix 20, Procardia 30 mg, amlodipine 10, metoprolol tartrate 100 mg.      12 point review of symptoms negative unless otherwise stated above    PAST HISTORY:     Past Medical History:   Diagnosis Date    Antisynthetase syndrome     HTN (hypertension)     Interstitial lung disease     Obesity, unspecified     Rheumatoid arthritis, unspecified         Past Surgical History:   Procedure Laterality Date     SECTION      X5    RENAL BIOPSY         Family History   Problem Relation Age of Onset    Hypertension Mother     Diabetes Mother     Heart disease Mother     Hypertension Father     Diabetes Father     Heart disease Sister        Social History     Socioeconomic History    Marital status: Single   Tobacco Use    Smoking status: Never    Smokeless tobacco: Never   Substance and Sexual Activity    Alcohol use: Never    Drug use:  Never    Sexual activity: Not Currently       MEDICATIONS:     Current Outpatient Medications   Medication Instructions    ACTEMRA ACTPEN 162 mg/0.9 mL PnIj Subcutaneous    albuterol (PROVENTIL) 2.5 mg /3 mL (0.083 %) nebulizer solution 3 mLs, Nebulization, Every 8 hours PRN    albuterol (PROVENTIL/VENTOLIN HFA) 90 mcg/actuation inhaler 2 puffs, Inhalation, Every 6 hours    albuterol-ipratropium (DUO-NEB) 2.5 mg-0.5 mg/3 mL nebulizer solution 3 mLs, Every 8 hours PRN    amLODIPine (NORVASC) 10 mg, Oral, Daily    aspirin (ECOTRIN) 81 mg, Oral, Daily, After lunch    budesonide-formoterol 160-4.5 mcg (SYMBICORT) 160-4.5 mcg/actuation HFAA 1 puff, Inhalation, Daily PRN    cimetidine (TAGAMET) 800 mg, Oral, Nightly    diclofenac sodium (VOLTAREN) 1 % Gel 1 Tube, Topical (Top), Every 6 hours PRN    ergocalciferol (ERGOCALCIFEROL) 50,000 unit Cap 1 capsule, Oral, Daily    ezetimibe (ZETIA) 10 mg, Oral, Nightly    famotidine (PEPCID) 20 mg, Oral, Every 12 hours    fluticasone propionate (FLOVENT DISKUS) 250 mcg/actuation DsDv 1 puff, Inhalation, Daily    furosemide (LASIX) 20 mg, Oral, Daily PRN    gabapentin (NEURONTIN) 300 mg, Oral, 2 times daily    hydrOXYchloroQUINE (PLAQUENIL) 200 mg tablet Take 1 tablet by mouth twice daily    ibuprofen (ADVIL,MOTRIN) 800 mg, Oral, 3 times daily PRN    loratadine (CLARITIN) 10 mg, Oral, Daily    meloxicam (MOBIC) 15 mg, Oral, Daily, After lunch    metoprolol tartrate (LOPRESSOR) 100 mg, Oral, 2 times daily    NIFEdipine (PROCARDIA-XL) 30 mg, Oral, Daily    OFEV 150 mg, Oral, Every 12 hours, Take with food    omeprazole (PRILOSEC) 20 mg, Oral, Daily    ondansetron (ZOFRAN-ODT) 4 mg, Oral, Every 6 hours PRN    OXYGEN-AIR DELIVERY SYSTEMS MISC Inhalation    pantoprazole (PROTONIX) 40 mg, Oral, Every morning    traMADoL (ULTRAM) 50 mg, Oral, Every 6 hours PRN        OBJECTIVE:   Vital Signs:  There were no vitals filed for this visit.       Physical Exam:  General: Awake, alert, &  oriented to person, place & time. No acute distress. In wheelchair. Supplemental oxygen noted  Psychiatric: Mood and affect normal  HEENT: Normocephalic, atraumatic. Face symmetric.  Cardiovascular: Regular rate & rhythm. Normal S1 & S2 w/out murmurs, rubs or gallops.  No JVD appreciated.  2+ pulses throughout.  Pulmonary: Bilateral symmetric chest rise. Non-labored, no wheezes, rhonchi or crackles are appreciated. On 3L NC.  Abdominal:  Soft, nontender, nondistended. Bowel sounds present  Extremities: No clubbing, cyanosis or edema.  Skin:  Exposed skin is warm & dry.  Neuro:   Patient moves all extremities equally. Sensation intact bilateraly.      Laboratory  Lab Results   Component Value Date     (H) 07/28/2022    K 3.4 (L) 07/28/2022    CO2 29 07/28/2022    BUN 7.8 07/28/2022    CREATININE 1.07 (H) 07/28/2022    CALCIUM 7.4 (L) 07/28/2022    BILIDIR 0.2 07/15/2021    IBILI 0.30 07/15/2021    ALKPHOS 62 07/28/2022    AST 27 07/28/2022    ALT 37 07/28/2022    MG 1.40 (L) 07/28/2022    PHOS 3.7 07/28/2022        Lab Results   Component Value Date    WBC 16.4 (H) 07/28/2022    RBC 4.10 (L) 07/28/2022    HGB 11.1 (L) 07/28/2022    HCT 34.5 (L) 07/28/2022    MCV 84.1 07/28/2022    MCH 27.1 07/28/2022    MCHC 32.2 (L) 07/28/2022    RDW 16.1 07/28/2022     07/28/2022    MPV 10.8 (H) 07/28/2022          ASSESSMENT & PLAN:     Acute Kidney Injury - Resolved  Low Vitamin D  Hypomagnesemia  -renal biopsy limited but no significant findings or concern for immune-complex mediated process  -Labs today show still show full recovery of renal indices.  -However, vitamin D and magnesium are both mildly low. Have asked the patient to  supplements over the counter.    -Follow low sodium diet (2 grams a day)  -Control diabetes (goal A1C <7.0%)  -Control high blood pressure ( goal BP < 130/80, please record BP at home every day and bring log to next office visit)  -Exercise at least 30 minutes a day, 5 days a  week.  -Maintain healthy weight.  -Decrease or stop alcohol use  -Do not smoke  -Stay well hydrated  -Receive Pneumovax, Flu, and HBV vaccines if indicated.  -Do not take NSAIDs (Ibuprofen, Naproxen, Aleve, Advil, Toradol, Mobic), may take only Tylenol as needed for pain/headaches.  -Take cholesterol-lowering medications as prescribed (LDL goal <100)    HTN  -well-controlled today  -continue current BP medications    Lower extremity edema-Improved  SOB  -likely due to underlying autoimmune disease   -ordered lasix 20mg PRN for worsening SOB and/or swelling  -discussed taking in moderation as to not affect renal function.   -continue to follow with pulmonology and rheumatology       RTC in 5 months with labs.      Hai Bills MD  PGY-3, Internal Medicine

## 2023-06-06 ENCOUNTER — TELEPHONE (OUTPATIENT)
Dept: TRANSPLANT | Facility: CLINIC | Age: 48
End: 2023-06-06
Payer: MEDICARE

## 2023-06-06 NOTE — LETTER
June 6, 2023    iRchard Kuhn Jr.  [address] [place] [address]  [address] [place] [address]  Phone: [phone]  Fax: [phone]      Dear Richard Kuhn Jr.:    Patient: Ofelia Brady   MR Number: [id number]   YOB: 1975     Thank you for the referral of Ofelia Brady to our lung transplant program. An initial appointment with the transplant team has been scheduled for 06/08/23. You will receive an after-visit summary following the completion of your patients appointment in our clinic.    Thank you again for your trust in our program. If there is anything we can do for you or your staff, please feel free to contact us at [phone].    Sincerely,       Letitia Stubbs D.O.  Medical Director, Lung Transplant  Pulmonary & Critical Care Medicine    [facility]  [address] [place] [address]  [phone]

## 2023-06-07 ENCOUNTER — TELEPHONE (OUTPATIENT)
Dept: TRANSPLANT | Facility: CLINIC | Age: 48
End: 2023-06-07
Payer: MEDICARE

## 2023-06-07 NOTE — TELEPHONE ENCOUNTER
Spoke with patient about r/s upcoming appointments on tomorrow to 6/19      ----- Message from Letitia Schmidt sent at 6/7/2023 12:32 PM CDT -----  Regarding: Appt          Current Appt date:  06/08/23       Type of Appt:       Physician: Yenifer Quinonez MD      Reason for rescheduling: Patient sister states they just received notice of appts yesterday will need 48 hours to set up transportation       Caller: Nohelia       Contact Preference:  [phone]

## 2023-06-15 ENCOUNTER — DOCUMENTATION ONLY (OUTPATIENT)
Dept: TRANSPLANT | Facility: CLINIC | Age: 48
End: 2023-06-15
Payer: MEDICARE

## 2023-06-21 ENCOUNTER — TELEPHONE (OUTPATIENT)
Dept: TRANSPLANT | Facility: CLINIC | Age: 48
End: 2023-06-21
Payer: MEDICARE

## 2023-06-21 NOTE — LETTER
June 21, 2023    [address] [place] [address]      Dear Ofelia Brady:    Patient: Ofelia Brady   MR Number: [id number]   YOB: 1975     We hope this letter finds you well. You missed your appointment in the lung transplant department that was scheduled for 06/19/23. Please contact us at [phone] to re-schedule your appointment.                                                                               Sincerely,     Letitia Stubbs D.O.  Medical Director, Lung Transplant  Pulmonary & Critical Care Medicine  [facility]  [address] [place] [phone]

## 2023-07-18 NOTE — PROGRESS NOTES
Patient ID: [id number]     Chief Complaint: Medication Refill (Pt states her medications are working well. States she is doing well. )      Referred By: No ref. provider found     HPI:     Ofelia Brady is a 48 y.o. female here today for a new patient visit.      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 She is on continuous O2 2L per NC and 4L on CPAP at night. She was treated with steroid in past. Patient is on Actemra and Ofev.    She take Actemra 162 mg SQ once a week and OFEV 150 mg BID.   Admits SOB with exertion, stable. Not getting worse. No cough. Follows with pulmonary and cardiology here.   Admits rash on face and neck, worse in sun.   Wrist pain on right and intermittent right knee pain. No red, warm and swollen joints. Morning stiffness for an hour.    Denies history of fevers, photosensitivity, oral or nasal ulcers, h/o MI, stroke, seizures, h/o PE or DVT, Raynaud's phenomenon, uveitis, malignancies.   Family history of autoimmune disease: none.  Pregnancies: 7  Miscarriages: 2, both in 4th month.  Smoking: nonsmoker.       Social History     Tobacco Use   Smoking Status Never   Smokeless Tobacco Never          ----------------------------  Antisynthetase syndrome  HTN (hypertension)  Interstitial lung disease  Obesity, unspecified  Rheumatoid arthritis, unspecified     Past Surgical History:   Procedure Laterality Date     SECTION      X5    RENAL BIOPSY         Review of patient's allergies indicates:  No Known Allergies    Outpatient Medications Marked as Taking for the 23 encounter (Office Visit) with Sarah Starr MD   Medication Sig Dispense Refill    albuterol (PROVENTIL) 2.5 mg /3 mL (0.083 %) nebulizer solution Take 3 mLs by nebulization every 8 (eight) hours as needed.      albuterol (PROVENTIL/VENTOLIN HFA) 90 mcg/actuation inhaler Inhale 2 puffs into the lungs every 6  (six) hours.      albuterol-ipratropium (DUO-NEB) 2.5 mg-0.5 mg/3 mL nebulizer solution 3 mLs every 8 (eight) hours as needed.      amLODIPine (NORVASC) 10 MG tablet Take 10 mg by mouth once daily.      aspirin (ECOTRIN) 81 MG EC tablet Take 1 tablet (81 mg total) by mouth once daily. After lunch 30 tablet 5    diclofenac sodium (VOLTAREN) 1 % Gel Apply 1 Tube topically every 6 (six) hours as needed.      ergocalciferol (ERGOCALCIFEROL) 50,000 unit Cap Take 1 capsule by mouth Daily.      ezetimibe (ZETIA) 10 mg tablet Take 10 mg by mouth every evening.      famotidine (PEPCID) 20 MG tablet Take 20 mg by mouth every 12 (twelve) hours.      fluticasone propionate (FLOVENT DISKUS) 250 mcg/actuation DsDv Inhale 1 puff into the lungs once daily.      furosemide (LASIX) 20 MG tablet Take 1 tablet (20 mg total) by mouth daily as needed (for excessive swelling or shortness of breath). 90 tablet 2    gabapentin (NEURONTIN) 300 MG capsule Take 1 capsule (300 mg total) by mouth 2 (two) times a day. 60 capsule 5    ibuprofen (ADVIL,MOTRIN) 800 MG tablet Take 800 mg by mouth 3 (three) times daily as needed.      loratadine (CLARITIN) 10 mg tablet Take 10 mg by mouth once daily at 6am.      meloxicam (MOBIC) 15 MG tablet Take 1 tablet (15 mg total) by mouth once daily. After lunch 30 tablet 5    metoprolol tartrate (LOPRESSOR) 100 MG tablet Take 100 mg by mouth 2 (two) times daily.      NIFEdipine (PROCARDIA-XL) 30 MG (OSM) 24 hr tablet Take 1 tablet (30 mg total) by mouth once daily. 30 tablet 11    omeprazole (PRILOSEC) 20 MG capsule Take 20 mg by mouth once daily.      OXYGEN-AIR DELIVERY SYSTEMS MISC Inhale into the lungs.      pantoprazole (PROTONIX) 40 MG tablet Take 40 mg by mouth every morning.      traMADoL (ULTRAM) 50 mg tablet Take 50 mg by mouth every 6 (six) hours as needed.      [DISCONTINUED] ACTEMRA ACTPEN 162 mg/0.9 mL PnIj Inject 1 mL into the skin every 7 days.      [DISCONTINUED] nintedanib (OFEV) 150 mg Cap  Take 1 capsule (150 mg total) by mouth every 12 (twelve) hours. Take with food 180 capsule 1       Social History     Socioeconomic History    Marital status: Single   Tobacco Use    Smoking status: Never    Smokeless tobacco: Never   Substance and Sexual Activity    Alcohol use: Never    Drug use: Never    Sexual activity: Not Currently        Family History   Problem Relation Age of Onset    Hypertension Mother     Diabetes Mother     Heart disease Mother     Hypertension Father     Diabetes Father     Heart disease Sister         Immunization History   Administered Date(s) Administered    Influenza - Quadrivalent - PF *Preferred* (6 months and older) 11/01/2022    Pneumococcal Conjugate - 13 Valent 02/04/2021       Patient Care Team:  Chaitanya Cosme NP as PCP - General (Family Medicine)  Evie Richter MD as Consulting Physician (Nephrology)     Subjective:     Constitutional:  Denies chills. Denies fever. Denies night sweats. Denies weight loss.   Ophthalmology: Denies blurred vision. Denies dry eyes. Denies eye pain. Denies Itching and redness.   ENT: Denies oral ulcers. Denies epistaxis. Denies dry mouth. Denies swollen glands.   Endocrine: Denies diabetes. Denies thyroid Problems.   Respiratory: Denies cough. Denies shortness of breath. Admits shortness of breath with exertion. Denies hemoptysis.   Cardiovascular: Denies chest pain at rest. Denies chest pain with exertion. Denies palpitations.    Gastrointestinal: Denies abdominal pain. Admits diarrhea, 4-5 times a day. Denies nausea. Denies vomiting. Denies hematemesis or hematochezia. Denies heartburn.  Genitourinary: Denies blood in urine.  Musculoskeletal: See HPI for details  Integumentary: per HPI  Peripheral Vascular: Denies Ulcers of hands and/or feet. Denies Cold extremities.   Neurologic: Denies dizziness. Denies headache.  Denies loss of strength. Denies numbness or tingling.   Psychiatric: Denies depression. Denies anxiety. Denies  "suicidal/homicidal ideations.      Objective:     /88 (BP Location: Right arm, Patient Position: Sitting, BP Method: Large (Automatic))   Pulse 96   Temp 98.1 °F (36.7 °C) (Oral)   Resp 20   Ht 4' 7" (1.397 m)   Wt 79 kg (174 lb 3.2 oz)   SpO2 100%   BMI 40.49 kg/m²     Physical Exam    General Appearance: alert, pleasant, in no acute distress.  Skin: Skin color, texture, turgor normal. No rashes or lesions. Dry macular rash on face around nose and papular rash on back of neck.   Eyes:  extraocular movement intact (EOMI), pupils equal, round, reactive to light and accommodation, conjunctiva clear.  ENT: No oral or nasal ulcers.  Neck:  Neck supple. No adenopathy.   Lungs: CTA throughout without crackles, rhonchi, or wheezes.   Heart: RRR w/o murmurs.  No edema.   Abdomen: Soft, non-tender, no masses, rebound or guarding.  Neuro: Alert, oriented, CN II-XII GI, sensory and motor innervation intact.  Musculoskeletal: No active synovitis. Decrease ROM of bilateral wrists, no swelling.  Psych: Alert, oriented, normal eye contact.    Labs:   2020:  Anti CCP negative.  Anca, MPO and PR3 negative.  Rheumatoid factor IgA negative, IgG 22, low titer, IgM 13, low titer.   2/2021:  Beta 2 glycoprotein negative.  Lupus anticoagulant not detected.  Cardiolipin IgM 30, slightly elevated, IgG negative.  Negative RNA polymerase 3, dsDNA, RNP, SSB, negative Smith, Scl 70, Tarsha 1.  C3, C4 okay.  TPMT gene mutation not detected.  Urine protein creatinine ratio 157.  Positive INES, 1:640, cytoplasmic pattern.  Positive SSA, 117, high titer.  Negative fibrillin, PM/Scl, Tarsha 1.  Myositis panel showed positive PL 12 antibody.  4/2021:  Elevated IgA 380, normal between 87 to 352.  Elevated IgM, 264, normal between 26 to 217.  Normal IgG level.  CPK elevated 456.  7/2022: ANCA negative. RF IgA and anti CCP negative. C3, C4 ok. dsDNA negative. INES 1:160, cytoplasmic pattern.  WBC count 16.4.  Hemoglobin 11.1.  Creatinine 1.07.  CMP " okay. No protein and blood in urine.     Imaging:      TTE on 4/27/2021 showed an EF of 60%, borderline concentric LVH. She had mild TR with RVSP of 57mmHg.   07/20/2022 echocardiogram showed EF normal, 60%.  Systolic function normal.  Low normal right ventricular systolic function.  PASP 62 mmHg.  Moderate pulmonary hypertension.  Moderate pulmonary regurgitation.    4/2021:  CT chest showed worsened appearance of lungs compared to prior in August 2020 with extensive ground-glass and reticular opacities in both lungs.  No convincing honeycombing.  Mild bronchiectasis favoring the lung bases.  10/19/23:  CT scan of chest showed diffuse bilateral ground-glass interstitial infiltrates associated with interstitial fibrosis in periphery of lungs bilaterally.  Some cystic changes seen in periphery of lungs bilaterally.  No significant change of ILD since April 2021.  Some bronchiectasis seen in upper lobes bilaterally.  No pleural effusion, mediastinum unremarkable, no lymphadenopathy.    7/2022:  Kidney biopsy showed limited sample immunofluorescence negative for immune complex mediated process.  Acute tubular injury.  Mild interstitial fibrosis and atrophy.    Assessment:       ICD-10-CM ICD-9-CM   1. Antisynthetase syndrome  D89.89 279.49   2. ILD (interstitial lung disease)  J84.9 515   3. Seropositive rheumatoid arthritis of multiple sites  M05.79 714.0   4. Pulmonary HTN  I27.20 416.8   5. Oxygen dependent  Z99.81 V46.2   6. High risk medication use  Z79.899 V58.69   7. Positive INES (antinuclear antibody)  R76.8 795.79   8. Rash  R21 782.1        Plan:       1. 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 She continues to be on home oxygen at 2 L.   She take Actemra 162 mg SQ once a week and OFEV 150 mg BID.  Shortness of breath is stable per patient.  Continue with Actemra and Ofev at the same doses.  Will repeat CT scan of chest and  echocardiogram and after test results will discuss further management.  She was not able to do PFT's in 8/2022.   - Never tired cellcept in the past.   - restart Plaquenil 200 mg b.i.d..  Placed Ophthalmology referral for eye exam on Plaquenil.    2.+ RF rheumatoid arthritis. She does have  inflammatory pattern of joint pain. Continue Actemra.     3. 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 On Ofev and Actmera. Reports significant symptomatic improvement and increase ability to do ADLs after initiation of Ofev. On 2LNC, stable, no progression in lung disease in CT scan of the chest done in October 2022. Unable to perform PFT. Will repeat CT, continue follow-up with Pulmonary.    4. Pulmonary hypertension. 07/20/2022 echocardiogram showed EF normal, 60%.  Systolic function normal.  Low normal right ventricular systolic function.  PASP 62 mmHg.  Moderate pulmonary hypertension.  Moderate pulmonary regurgitation.  - ordered repeat echocardiogram.  She follows with [facility]iology at [facility].  We will discuss about staying pulmonary hypertension specialist in the future.    5. Hx of two miscarriages. Both at 4 months gestation.  2/2021:  Beta 2 glycoprotein negative.  Lupus anticoagulant not detected.  Cardiolipin IgM 30, slightly elevated, IgG negative.  Repeat APL labs today. Takes ASA daily.    6. Rash: Dry macular rash on face around nose and papular rash on back of neck. Photoprotection discussed.  Send prescription for topical triamcinolone cream.    7. HTN. Well-controlled, follow-up with PCP.      8. High Risk Med Use. Persons with rheumatoid arthritis, lupus, psoriatic arthritis and other autoimmune diseases are at increased risk of cardiovascular disease including heart attack and stroke. We recommend that all patients with these conditions have annual health maintenance exams including lipid measurements, blood pressure measurements, and smoking cessation counseling when applicable at their primary care provider's  office.   -Advised to stay up-to-date on age appropriate vaccinations and malignancy screening, including yearly skin exams.    - PCV 13 in February 2021.  I will discuss about Pneumovax in future visits.    Follow up in about 3 months (around 10/19/2023). In addition to their scheduled follow up, the patient has also been instructed to follow up on as needed basis.        Total time spent with patient and documentation is more than 60 minutes. All questions were answered to patient's satisfaction and patient verbalized understanding.

## 2023-07-19 ENCOUNTER — OFFICE VISIT (OUTPATIENT)
Dept: RHEUMATOLOGY | Facility: CLINIC | Age: 48
End: 2023-07-19
Payer: MEDICARE

## 2023-07-19 ENCOUNTER — TELEPHONE (OUTPATIENT)
Dept: RHEUMATOLOGY | Facility: CLINIC | Age: 48
End: 2023-07-19

## 2023-07-19 VITALS
DIASTOLIC BLOOD PRESSURE: 88 MMHG | WEIGHT: 174.19 LBS | TEMPERATURE: 98 F | RESPIRATION RATE: 20 BRPM | SYSTOLIC BLOOD PRESSURE: 119 MMHG | BODY MASS INDEX: 40.31 KG/M2 | OXYGEN SATURATION: 100 % | HEIGHT: 55 IN | HEART RATE: 96 BPM

## 2023-07-19 DIAGNOSIS — I27.20 PULMONARY HTN: ICD-10-CM

## 2023-07-19 DIAGNOSIS — Z79.899 HIGH RISK MEDICATION USE: ICD-10-CM

## 2023-07-19 DIAGNOSIS — R74.8 ELEVATED ALDOLASE LEVEL: ICD-10-CM

## 2023-07-19 DIAGNOSIS — R76.8 POSITIVE ANA (ANTINUCLEAR ANTIBODY): ICD-10-CM

## 2023-07-19 DIAGNOSIS — R21 RASH: ICD-10-CM

## 2023-07-19 DIAGNOSIS — R79.82 ELEVATED C-REACTIVE PROTEIN (CRP): ICD-10-CM

## 2023-07-19 DIAGNOSIS — D89.89 ANTISYNTHETASE SYNDROME: Primary | Chronic | ICD-10-CM

## 2023-07-19 DIAGNOSIS — Z99.81 OXYGEN DEPENDENT: ICD-10-CM

## 2023-07-19 DIAGNOSIS — D72.829 LEUKOCYTOSIS, UNSPECIFIED TYPE: Primary | ICD-10-CM

## 2023-07-19 DIAGNOSIS — M05.79 SEROPOSITIVE RHEUMATOID ARTHRITIS OF MULTIPLE SITES: ICD-10-CM

## 2023-07-19 DIAGNOSIS — J84.9 ILD (INTERSTITIAL LUNG DISEASE): ICD-10-CM

## 2023-07-19 PROCEDURE — 99215 OFFICE O/P EST HI 40 MIN: CPT | Mod: S$PBB,NTX,, | Performed by: INTERNAL MEDICINE

## 2023-07-19 PROCEDURE — 3008F BODY MASS INDEX DOCD: CPT | Mod: CPTII,NTX,, | Performed by: INTERNAL MEDICINE

## 2023-07-19 PROCEDURE — 3066F NEPHROPATHY DOC TX: CPT | Mod: CPTII,NTX,, | Performed by: INTERNAL MEDICINE

## 2023-07-19 PROCEDURE — 3074F PR MOST RECENT SYSTOLIC BLOOD PRESSURE < 130 MM HG: ICD-10-PCS | Mod: CPTII,NTX,, | Performed by: INTERNAL MEDICINE

## 2023-07-19 PROCEDURE — 3074F SYST BP LT 130 MM HG: CPT | Mod: CPTII,NTX,, | Performed by: INTERNAL MEDICINE

## 2023-07-19 PROCEDURE — 3079F PR MOST RECENT DIASTOLIC BLOOD PRESSURE 80-89 MM HG: ICD-10-PCS | Mod: CPTII,NTX,, | Performed by: INTERNAL MEDICINE

## 2023-07-19 PROCEDURE — 99417 PR PROLONGED SVC, OUTPT, W/WO DIRECT PT CONTACT,  EA ADDTL 15 MIN: ICD-10-PCS | Mod: S$PBB,NTX,, | Performed by: INTERNAL MEDICINE

## 2023-07-19 PROCEDURE — 3008F PR BODY MASS INDEX (BMI) DOCUMENTED: ICD-10-PCS | Mod: CPTII,NTX,, | Performed by: INTERNAL MEDICINE

## 2023-07-19 PROCEDURE — 99417 PROLNG OP E/M EACH 15 MIN: CPT | Mod: S$PBB,NTX,, | Performed by: INTERNAL MEDICINE

## 2023-07-19 PROCEDURE — 3066F PR DOCUMENTATION OF TREATMENT FOR NEPHROPATHY: ICD-10-PCS | Mod: CPTII,NTX,, | Performed by: INTERNAL MEDICINE

## 2023-07-19 PROCEDURE — 99214 OFFICE O/P EST MOD 30 MIN: CPT | Mod: PBBFAC,TXP | Performed by: INTERNAL MEDICINE

## 2023-07-19 PROCEDURE — 99215 PR OFFICE/OUTPT VISIT, EST, LEVL V, 40-54 MIN: ICD-10-PCS | Mod: S$PBB,NTX,, | Performed by: INTERNAL MEDICINE

## 2023-07-19 PROCEDURE — 3079F DIAST BP 80-89 MM HG: CPT | Mod: CPTII,NTX,, | Performed by: INTERNAL MEDICINE

## 2023-07-19 RX ORDER — TOCILIZUMAB 180 MG/ML
1 INJECTION, SOLUTION SUBCUTANEOUS
Qty: 14 EACH | Refills: 1 | Status: SHIPPED | OUTPATIENT
Start: 2023-07-19 | End: 2023-11-06 | Stop reason: SDUPTHER

## 2023-07-19 RX ORDER — TRIAMCINOLONE ACETONIDE 1 MG/G
CREAM TOPICAL 2 TIMES DAILY PRN
Qty: 60 G | Refills: 1 | Status: SHIPPED | OUTPATIENT
Start: 2023-07-19 | End: 2023-09-20

## 2023-07-19 RX ORDER — HYDROXYCHLOROQUINE SULFATE 200 MG/1
200 TABLET, FILM COATED ORAL 2 TIMES DAILY
Qty: 60 TABLET | Refills: 6 | Status: SHIPPED | OUTPATIENT
Start: 2023-07-19 | End: 2023-08-18

## 2023-07-19 RX ORDER — NINTEDANIB 150 MG/1
150 CAPSULE ORAL EVERY 12 HOURS
Qty: 180 CAPSULE | Refills: 1 | Status: SHIPPED | OUTPATIENT
Start: 2023-07-19 | End: 2023-09-20 | Stop reason: ALTCHOICE

## 2023-07-20 ENCOUNTER — TELEPHONE (OUTPATIENT)
Dept: RHEUMATOLOGY | Facility: CLINIC | Age: 48
End: 2023-07-20
Payer: MEDICARE

## 2023-07-20 NOTE — TELEPHONE ENCOUNTER
WBC counts are elevated, does she have any fever or signs of infection?  She has intermittent elevation of WBC count but now with highest than ever been.  Inflammatory markers are also elevated.  Ordered labs to monitor these, she can do labs in 2-3 weeks.  One of the liver enzyme is slightly elevated it could be because of Ofev, we will continue to monitor for now.

## 2023-07-20 NOTE — TELEPHONE ENCOUNTER
Pt notified and voiced understanding. Pt states she has been feeling drained and having loss of appetite  lately. She also states she often feels warm during the day.

## 2023-07-20 NOTE — TELEPHONE ENCOUNTER
Spoke with pharmacy regarding Actemra injection. Pharmacy wanted to clarify that dose was correct, instructions have 1 mL but the pen comes in 0.9 mL. Informed [facility] that 162 mg/0.9 mL is the correct dose. Verified that 14 pens are to be dispensed with 1 refill per order in chart.    ----- Message from Alina Boyce sent at 7/19/2023  2:12 PM CDT -----  Regarding: Medication Management  Carissa from [facility] called in regards to RX ACTEMARA 162 mg. She needs to know the correct directions on medication. Please advise [phone]    Thank You

## 2023-08-09 ENCOUNTER — TELEPHONE (OUTPATIENT)
Dept: TRANSPLANT | Facility: CLINIC | Age: 48
End: 2023-08-09
Payer: MEDICARE

## 2023-08-09 NOTE — TELEPHONE ENCOUNTER
----- Message from Grant Guerrero sent at 2023  7:59 AM CDT -----  Good morning Ladies,    Can you give me the status of the above patient. Her Eval auth is set to  and she will not be given an extension because we had to use [employer] contract for transplant services and will not extended her auth.    Thanks   Cesar     Contacted patient.  Inquired if she is interested in lung transplantation or not.  Informed her that her insurance authorization is only good for 1 year and it will  on 23.  Also informed her that her insurance will not extend the authorization.  Informed patient that if she is interested in scheduling an appointment, then she will need to schedule an appointment prior to 23.  Patient stated that she is interested in lung transplantation and would like to schedule an appointment.  Patient stated that she has issues with transportation.  She informed me that she was unable to arrange transportation for her previously scheduled appointment.  She stated that she was told by transport that [place] is too far to travel.  Inquired if she has family or a friend who can bring her.  Patient stated that she will speak with her sister.  Notified her of our clinic availability.  She requested an afternoon appointment during the last week of August.  She requested that we schedule the appointment and contact her with the date and times.  Informed patient that the appointment with the testing (PFTs, 6 minute walk test).  She verbalized her understanding of  all discussed.      Notified Cesar of my conversation with patient.  
DR MCKINLEY

## 2023-08-14 ENCOUNTER — TELEPHONE (OUTPATIENT)
Dept: RHEUMATOLOGY | Facility: CLINIC | Age: 48
End: 2023-08-14
Payer: MEDICARE

## 2023-08-14 ENCOUNTER — HOSPITAL ENCOUNTER (OUTPATIENT)
Dept: CARDIOLOGY | Facility: HOSPITAL | Age: 48
Discharge: HOME | End: 2023-08-14
Attending: INTERNAL MEDICINE
Payer: MEDICARE

## 2023-08-14 ENCOUNTER — HOSPITAL ENCOUNTER (OUTPATIENT)
Dept: RADIOLOGY | Facility: HOSPITAL | Age: 48
Discharge: HOME | End: 2023-08-14
Attending: INTERNAL MEDICINE
Payer: MEDICARE

## 2023-08-14 VITALS
WEIGHT: 174 LBS | BODY MASS INDEX: 40.27 KG/M2 | SYSTOLIC BLOOD PRESSURE: 112 MMHG | HEIGHT: 55 IN | DIASTOLIC BLOOD PRESSURE: 77 MMHG

## 2023-08-14 DIAGNOSIS — D89.89 ANTISYNTHETASE SYNDROME: Chronic | ICD-10-CM

## 2023-08-14 DIAGNOSIS — J84.9 ILD (INTERSTITIAL LUNG DISEASE): ICD-10-CM

## 2023-08-14 DIAGNOSIS — D89.89 ANTISYNTHETASE SYNDROME: ICD-10-CM

## 2023-08-14 DIAGNOSIS — I27.20 PULMONARY HTN: ICD-10-CM

## 2023-08-14 DIAGNOSIS — R79.89 ELEVATED LFTS: Primary | ICD-10-CM

## 2023-08-14 DIAGNOSIS — R74.8 ABNORMAL LEVELS OF OTHER SERUM ENZYMES: ICD-10-CM

## 2023-08-14 LAB
AV INDEX (PROSTH): 0.73
AV MEAN GRADIENT: 3 MMHG
AV PEAK GRADIENT: 6 MMHG
AV VALVE AREA BY VELOCITY RATIO: 2.29 CM²
AV VALVE AREA: 2.26 CM²
AV VELOCITY RATIO: 0.74
BSA FOR ECHO PROCEDURE: 1.75 M2
CV ECHO LV RWT: 0.45 CM
DOP CALC AO PEAK VEL: 1.25 M/S
DOP CALC AO VTI: 22.2 CM
DOP CALC LVOT AREA: 3.1 CM2
DOP CALC LVOT DIAMETER: 1.98 CM
DOP CALC LVOT PEAK VEL: 0.93 M/S
DOP CALC LVOT STROKE VOLUME: 50.16 CM3
DOP CALC MV VTI: 21.6 CM
DOP CALCLVOT PEAK VEL VTI: 16.3 CM
E WAVE DECELERATION TIME: 163.25 MSEC
E/A RATIO: 0.85
ECHO LV POSTERIOR WALL: 0.77 CM (ref 0.6–1.1)
FRACTIONAL SHORTENING: 41 % (ref 28–44)
HR MV ECHO: 93 BPM
INTERVENTRICULAR SEPTUM: 0.67 CM (ref 0.6–1.1)
IVC DIAMETER: 1.5 CM
LEFT ATRIUM SIZE: 2.46 CM
LEFT ATRIUM VOLUME INDEX MOD: 7.6 ML/M2
LEFT ATRIUM VOLUME MOD: 12.51 CM3
LEFT INTERNAL DIMENSION IN SYSTOLE: 2.01 CM (ref 2.1–4)
LEFT VENTRICLE DIASTOLIC VOLUME INDEX: 29.46 ML/M2
LEFT VENTRICLE DIASTOLIC VOLUME: 48.61 ML
LEFT VENTRICLE MASS INDEX: 38 G/M2
LEFT VENTRICLE SYSTOLIC VOLUME INDEX: 7.8 ML/M2
LEFT VENTRICLE SYSTOLIC VOLUME: 12.84 ML
LEFT VENTRICULAR INTERNAL DIMENSION IN DIASTOLE: 3.43 CM (ref 3.5–6)
LEFT VENTRICULAR MASS: 63.12 G
LV LATERAL E/E' RATIO: 5.27 M/S
LVOT MG: 1.7 MMHG
LVOT MV: 0.62 CM/S
MV MEAN GRADIENT: 2 MMHG
MV PEAK A VEL: 0.68 M/S
MV PEAK E VEL: 0.58 M/S
MV PEAK GRADIENT: 3 MMHG
MV VALVE AREA BY CONTINUITY EQUATION: 2.32 CM2
OHS LV EJECTION FRACTION SIMPSONS BIPLANE MOD: 57 %
PISA TR MAX VEL: 2.39 M/S
RA MAJOR: 3.5 CM
RA PRESSURE ESTIMATED: 3 MMHG
RV TB RVSP: 5 MMHG
TDI LATERAL: 0.11 M/S
TR MAX PG: 23 MMHG
TRICUSPID ANNULAR PLANE SYSTOLIC EXCURSION: 1.36 CM
TV REST PULMONARY ARTERY PRESSURE: 26 MMHG
Z-SCORE OF LEFT VENTRICULAR DIMENSION IN END DIASTOLE: -3
Z-SCORE OF LEFT VENTRICULAR DIMENSION IN END SYSTOLE: -2.82

## 2023-08-14 PROCEDURE — 93306 TTE W/DOPPLER COMPLETE: CPT | Mod: NTX

## 2023-08-14 PROCEDURE — 71250 CT THORAX DX C-: CPT | Mod: TC,NTX

## 2023-08-16 LAB
INR PPP: 1
INR PPP: 1
PROTHROMBIN TIME: 12.8 SECONDS (ref 11.4–14)
PROTHROMBIN TIME: 12.8 SECONDS (ref 11.4–14)

## 2023-08-22 ENCOUNTER — TELEPHONE (OUTPATIENT)
Dept: RHEUMATOLOGY | Facility: CLINIC | Age: 48
End: 2023-08-22
Payer: MEDICARE

## 2023-08-22 NOTE — TELEPHONE ENCOUNTER
Reviewed labs and records. Need to see her sooner for follow up, schedule her when there is an opening in 1-2 weeks.

## 2023-08-30 ENCOUNTER — TELEPHONE (OUTPATIENT)
Dept: TRANSPLANT | Facility: CLINIC | Age: 48
End: 2023-08-30
Payer: MEDICARE

## 2023-08-30 ENCOUNTER — OFFICE VISIT (OUTPATIENT)
Dept: RHEUMATOLOGY | Facility: CLINIC | Age: 48
End: 2023-08-30
Payer: MEDICARE

## 2023-08-30 VITALS
BODY MASS INDEX: 32.03 KG/M2 | TEMPERATURE: 98 F | WEIGHT: 138.38 LBS | RESPIRATION RATE: 20 BRPM | HEART RATE: 84 BPM | SYSTOLIC BLOOD PRESSURE: 116 MMHG | HEIGHT: 55 IN | DIASTOLIC BLOOD PRESSURE: 81 MMHG | OXYGEN SATURATION: 100 %

## 2023-08-30 DIAGNOSIS — M05.79 SEROPOSITIVE RHEUMATOID ARTHRITIS OF MULTIPLE SITES: ICD-10-CM

## 2023-08-30 DIAGNOSIS — I27.20 PULMONARY HTN: ICD-10-CM

## 2023-08-30 DIAGNOSIS — R79.89 ELEVATED LFTS: ICD-10-CM

## 2023-08-30 DIAGNOSIS — R74.01 TRANSAMINITIS: ICD-10-CM

## 2023-08-30 DIAGNOSIS — R74.8 ELEVATED ALDOLASE LEVEL: ICD-10-CM

## 2023-08-30 DIAGNOSIS — J84.9 ILD (INTERSTITIAL LUNG DISEASE): ICD-10-CM

## 2023-08-30 DIAGNOSIS — R21 RASH: ICD-10-CM

## 2023-08-30 DIAGNOSIS — Z79.899 HIGH RISK MEDICATION USE: ICD-10-CM

## 2023-08-30 DIAGNOSIS — Z99.81 OXYGEN DEPENDENT: ICD-10-CM

## 2023-08-30 DIAGNOSIS — D89.89 ANTISYNTHETASE SYNDROME: Primary | ICD-10-CM

## 2023-08-30 DIAGNOSIS — R76.8 POSITIVE ANA (ANTINUCLEAR ANTIBODY): ICD-10-CM

## 2023-08-30 PROCEDURE — 3008F PR BODY MASS INDEX (BMI) DOCUMENTED: ICD-10-PCS | Mod: CPTII,NTX,, | Performed by: INTERNAL MEDICINE

## 2023-08-30 PROCEDURE — 99215 OFFICE O/P EST HI 40 MIN: CPT | Mod: PBBFAC,TXP | Performed by: INTERNAL MEDICINE

## 2023-08-30 PROCEDURE — 3074F PR MOST RECENT SYSTOLIC BLOOD PRESSURE < 130 MM HG: ICD-10-PCS | Mod: CPTII,NTX,, | Performed by: INTERNAL MEDICINE

## 2023-08-30 PROCEDURE — 99215 OFFICE O/P EST HI 40 MIN: CPT | Mod: S$PBB,NTX,, | Performed by: INTERNAL MEDICINE

## 2023-08-30 PROCEDURE — 3066F PR DOCUMENTATION OF TREATMENT FOR NEPHROPATHY: ICD-10-PCS | Mod: CPTII,NTX,, | Performed by: INTERNAL MEDICINE

## 2023-08-30 PROCEDURE — 3066F NEPHROPATHY DOC TX: CPT | Mod: CPTII,NTX,, | Performed by: INTERNAL MEDICINE

## 2023-08-30 PROCEDURE — 1159F PR MEDICATION LIST DOCUMENTED IN MEDICAL RECORD: ICD-10-PCS | Mod: CPTII,NTX,, | Performed by: INTERNAL MEDICINE

## 2023-08-30 PROCEDURE — 3079F PR MOST RECENT DIASTOLIC BLOOD PRESSURE 80-89 MM HG: ICD-10-PCS | Mod: CPTII,NTX,, | Performed by: INTERNAL MEDICINE

## 2023-08-30 PROCEDURE — 3074F SYST BP LT 130 MM HG: CPT | Mod: CPTII,NTX,, | Performed by: INTERNAL MEDICINE

## 2023-08-30 PROCEDURE — 99215 PR OFFICE/OUTPT VISIT, EST, LEVL V, 40-54 MIN: ICD-10-PCS | Mod: S$PBB,NTX,, | Performed by: INTERNAL MEDICINE

## 2023-08-30 PROCEDURE — 3008F BODY MASS INDEX DOCD: CPT | Mod: CPTII,NTX,, | Performed by: INTERNAL MEDICINE

## 2023-08-30 PROCEDURE — 1159F MED LIST DOCD IN RCRD: CPT | Mod: CPTII,NTX,, | Performed by: INTERNAL MEDICINE

## 2023-08-30 PROCEDURE — 3079F DIAST BP 80-89 MM HG: CPT | Mod: CPTII,NTX,, | Performed by: INTERNAL MEDICINE

## 2023-08-30 RX ORDER — HYDROXYCHLOROQUINE SULFATE 200 MG/1
200 TABLET, FILM COATED ORAL 2 TIMES DAILY
COMMUNITY
Start: 2023-08-24 | End: 2023-11-06 | Stop reason: SDUPTHER

## 2023-08-30 NOTE — PROGRESS NOTES
Patient ID: [id number]     Chief Complaint: Follow-up (Pt states she is doing well today no complaints voiced at this time. )      HPI:     Ofelia Brady is a 48 y.o. female here today for follow up of anti synthetase syndrome.     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 She is on continuous O2 2L per NC and 4L on CPAP at night. She was treated with steroid in past. Patient is on Actemra and Ofev.    She take Actemra 162 mg SQ once a week and OFEV 150 mg BID.   Admits SOB with exertion, stable. Not getting worse. No cough. Follows with pulmonary and cardiology here.   Admits rash on face and neck, worse in sun.   H/o feeling feverish few weeks back, improved now. Continues to have fatigue and tiredness, worse than her baseline for last 3-4 weeks. No other symptoms of infection.    Wrist pain on right and intermittent right knee pain. No red, warm and swollen joints. Morning stiffness for an hour.    Denies history of fevers, photosensitivity, oral or nasal ulcers, h/o MI, stroke, seizures, h/o PE or DVT, Raynaud's phenomenon, uveitis, malignancies.   Family history of autoimmune disease: none.  Pregnancies: 7  Miscarriages: 2, both in 4th month.  Smoking: nonsmoker.       Social History     Tobacco Use   Smoking Status Never   Smokeless Tobacco Never          ----------------------------  Antisynthetase syndrome  HTN (hypertension)  Interstitial lung disease  Obesity, unspecified  Rheumatoid arthritis, unspecified     Past Surgical History:   Procedure Laterality Date     SECTION      X5    RENAL BIOPSY         Review of patient's allergies indicates:  No Known Allergies    Outpatient Medications Marked as Taking for the 23 encounter (Office Visit) with Sarah Starr MD   Medication Sig Dispense Refill    ACTEMRA ACTPEN 162 mg/0.9 mL PnIj Inject 1 mL into the skin every 7 days. 14 each 1     albuterol (PROVENTIL) 2.5 mg /3 mL (0.083 %) nebulizer solution Take 3 mLs by nebulization every 8 (eight) hours as needed.      albuterol (PROVENTIL/VENTOLIN HFA) 90 mcg/actuation inhaler Inhale 2 puffs into the lungs every 6 (six) hours.      albuterol-ipratropium (DUO-NEB) 2.5 mg-0.5 mg/3 mL nebulizer solution 3 mLs every 8 (eight) hours as needed.      amLODIPine (NORVASC) 10 MG tablet Take 10 mg by mouth once daily.      aspirin (ECOTRIN) 81 MG EC tablet Take 1 tablet (81 mg total) by mouth once daily. After lunch 30 tablet 5    budesonide-formoterol 160-4.5 mcg (SYMBICORT) 160-4.5 mcg/actuation HFAA Inhale 1 puff into the lungs daily as needed.      cimetidine (TAGAMET) 800 MG tablet Take 800 mg by mouth every evening.      diclofenac sodium (VOLTAREN) 1 % Gel Apply 1 Tube topically every 6 (six) hours as needed.      ergocalciferol (ERGOCALCIFEROL) 50,000 unit Cap Take 1 capsule by mouth Daily.      ezetimibe (ZETIA) 10 mg tablet Take 10 mg by mouth every evening.      famotidine (PEPCID) 20 MG tablet Take 20 mg by mouth every 12 (twelve) hours.      fluticasone propionate (FLOVENT DISKUS) 250 mcg/actuation DsDv Inhale 1 puff into the lungs once daily.      furosemide (LASIX) 20 MG tablet Take 1 tablet (20 mg total) by mouth daily as needed (for excessive swelling or shortness of breath). 90 tablet 2    gabapentin (NEURONTIN) 300 MG capsule Take 1 capsule (300 mg total) by mouth 2 (two) times a day. 60 capsule 5    ibuprofen (ADVIL,MOTRIN) 800 MG tablet Take 800 mg by mouth 3 (three) times daily as needed.      loratadine (CLARITIN) 10 mg tablet Take 10 mg by mouth once daily at 6am.      meloxicam (MOBIC) 15 MG tablet Take 1 tablet (15 mg total) by mouth once daily. After lunch 30 tablet 5    metoprolol tartrate (LOPRESSOR) 100 MG tablet Take 100 mg by mouth 2 (two) times daily.      NIFEdipine (PROCARDIA-XL) 30 MG (OSM) 24 hr tablet Take 1 tablet (30 mg total) by mouth once daily. 30 tablet 11    nintedanib  (OFEV) 150 mg Cap Take 1 capsule (150 mg total) by mouth every 12 (twelve) hours. Take with food 180 capsule 1    omeprazole (PRILOSEC) 20 MG capsule Take 20 mg by mouth once daily.      ondansetron (ZOFRAN-ODT) 4 MG TbDL Take 4 mg by mouth every 6 (six) hours as needed.      OXYGEN-AIR DELIVERY SYSTEMS MISC Inhale into the lungs.      pantoprazole (PROTONIX) 40 MG tablet Take 40 mg by mouth every morning.      traMADoL (ULTRAM) 50 mg tablet Take 50 mg by mouth every 6 (six) hours as needed.      triamcinolone acetonide 0.1% (KENALOG) 0.1 % cream Apply topically 2 (two) times daily as needed (rash). (Patient taking differently: Apply 1 g topically 2 (two) times daily.) 60 g 1       Social History     Socioeconomic History    Marital status: Single   Tobacco Use    Smoking status: Never    Smokeless tobacco: Never   Substance and Sexual Activity    Alcohol use: Never    Drug use: Never    Sexual activity: Not Currently        Family History   Problem Relation Age of Onset    Hypertension Mother     Diabetes Mother     Heart disease Mother     Hypertension Father     Diabetes Father     Heart disease Sister         Immunization History   Administered Date(s) Administered    Influenza - Quadrivalent - PF *Preferred* (6 months and older) 11/01/2022    Pneumococcal Conjugate - 13 Valent 02/04/2021       Patient Care Team:  Chaitanya Cosme NP as PCP - General (Family Medicine)  Evie Richter MD as Consulting Physician (Nephrology)     Subjective:     Constitutional:  Denies chills. Denies fever. Denies night sweats. Denies weight loss.   Ophthalmology: Denies blurred vision. Denies dry eyes. Denies eye pain. Denies Itching and redness.   ENT: Denies oral ulcers. Denies epistaxis. Denies dry mouth. Denies swollen glands.   Endocrine: Denies diabetes. Denies thyroid Problems.   Respiratory: Denies cough. Denies shortness of breath. Admits shortness of breath with exertion. Denies hemoptysis.   Cardiovascular:  "Denies chest pain at rest. Denies chest pain with exertion. Denies palpitations.    Gastrointestinal: Denies abdominal pain. Admits diarrhea, 4-5 times a day. Denies nausea. Denies vomiting. Denies hematemesis or hematochezia. Denies heartburn.  Genitourinary: Denies blood in urine.  Musculoskeletal: See HPI for details  Integumentary: per HPI  Peripheral Vascular: Denies Ulcers of hands and/or feet. Denies Cold extremities.   Neurologic: Denies dizziness. Denies headache.  Denies loss of strength. Denies numbness or tingling.   Psychiatric: Denies depression. Denies anxiety. Denies suicidal/homicidal ideations.      Objective:     /81 (BP Location: Right arm, Patient Position: Sitting, BP Method: Small (Automatic))   Pulse 84   Temp 98.2 °F (36.8 °C) (Oral)   Resp 20   Ht 4' 7" (1.397 m)   Wt 62.8 kg (138 lb 6.4 oz)   SpO2 100%   BMI 32.17 kg/m²     Physical Exam    General Appearance: alert, pleasant, in no acute distress.  Skin: Skin color, texture, turgor normal. No rashes or lesions. Dry macular rash on face around nose and papular rash on back of neck.   Eyes:  extraocular movement intact (EOMI), pupils equal, round, reactive to light and accommodation, conjunctiva clear.  ENT: No oral or nasal ulcers.  Neck:  Neck supple. No adenopathy.   Lungs: CTA throughout without crackles, rhonchi, or wheezes.   Heart: RRR w/o murmurs.  No edema.   Abdomen: Soft, non-tender, no masses, rebound or guarding.  Neuro: Alert, oriented, CN II-XII GI, sensory and motor innervation intact.  Musculoskeletal: No active synovitis. Decrease ROM of bilateral wrists, no swelling.  Psych: Alert, oriented, normal eye contact.    Labs:   2020:  Anti CCP negative.  Anca, MPO and PR3 negative.  Rheumatoid factor IgA negative, IgG 22, low titer, IgM 13, low titer.   2/2021:  Beta 2 glycoprotein negative.  Lupus anticoagulant not detected.  Cardiolipin IgM 30, slightly elevated, IgG negative.  Negative RNA polymerase 3, dsDNA, " RNP, SSB, negative Arango, Scl 70, Tarsha 1.  C3, C4 okay.  TPMT gene mutation not detected.  Urine protein creatinine ratio 157.  Positive INES, 1:640, cytoplasmic pattern.  Positive SSA, 117, high titer.  Negative fibrillin, PM/Scl, Tarsha 1.  Myositis panel showed positive PL 12 antibody.  4/2021:  Elevated IgA 380, normal between 87 to 352.  Elevated IgM, 264, normal between 26 to 217.  Normal IgG level.  CPK elevated 456.  7/2022: ANCA negative. RF IgA and anti CCP negative. C3, C4 ok. dsDNA negative. INES 1:160, cytoplasmic pattern.  WBC count 16.4.  Hemoglobin 11.1.  Creatinine 1.07.  CMP okay. No protein and blood in urine.   07/19/2023: WBC count 18.9, higher than before. Neutrophil count 10.13. Potassium 3.1. ALT elevated 76. CRP 44. ESR 55. Negative hepatitis-B, hepatitis-C, HIV. Phospholipid/cardiolipin IgM 20, weak positive, IgG negative. Beta 2 glycoprotein negative. Lupus anticoagulant not detected. QuantiFERON TB negative. CPK normal. 1+ protein and no blood in urine. Upc 200 milligram/gram. C3, C4 ok. Aldolase elevated 18.7.   8/14/23: AST elevated 72, ALT elevated 158. CRP 12.9. ESR 52. CPK normal. Aldolase 8.1, elevated. Kappa lambda light chain ratio normal. GGT elevated 117. SPEP and IFA okay, M spike not observed. No monoclonal bands detected. IgG level elevated 1853, normal between 500-1631. IgA elevated 642, normal between . IgM normal.    Imaging:      TTE on 4/27/2021 showed an EF of 60%, borderline concentric LVH. She had mild TR with RVSP of 57mmHg.   07/20/2022 echocardiogram showed EF normal, 60%.  Systolic function normal.  Low normal right ventricular systolic function.  PASP 62 mmHg.  Moderate pulmonary hypertension.  Moderate pulmonary regurgitation.  Echocardiogram 08/14/2023 showed EF 57%, systolic function normal of both right and left ventricle. PASP 26 mmHg.    4/2021:  CT chest showed worsened appearance of lungs compared to prior in August 2020 with extensive ground-glass and  reticular opacities in both lungs.  No convincing honeycombing.  Mild bronchiectasis favoring the lung bases.  10/19/23:  CT scan of chest showed diffuse bilateral ground-glass interstitial infiltrates associated with interstitial fibrosis in periphery of lungs bilaterally.  Some cystic changes seen in periphery of lungs bilaterally.  No significant change of ILD since April 2021.  Some bronchiectasis seen in upper lobes bilaterally.  No pleural effusion, mediastinum unremarkable, no lymphadenopathy.  CT chest 08/19/2023 showed consolidation seen in April 20, 2021 and October 2022 has resolved. Extensive septal thickening and bronchiectasis with sparing of lung apices. Faint bibasilar predominant ground-glass opacities to extensive septal thickening. There is calcified plaque in LAD    7/2022:  Kidney biopsy showed limited sample immunofluorescence negative for immune complex mediated process.  Acute tubular injury.  Mild interstitial fibrosis and atrophy.    Assessment:       ICD-10-CM ICD-9-CM   1. Antisynthetase syndrome  D89.89 279.49   2. Seropositive rheumatoid arthritis of multiple sites  M05.79 714.0   3. ILD (interstitial lung disease)  J84.9 515   4. Pulmonary HTN  I27.20 416.8   5. Oxygen dependent  Z99.81 V46.2   6. High risk medication use  Z79.899 V58.69   7. Positive INES (antinuclear antibody)  R76.8 795.79   8. Rash  R21 782.1   9. Elevated LFTs  R79.89 790.6   10. Elevated aldolase level  R74.8 790.5   11. Transaminitis  R74.01 790.4          Plan:       1. 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 She continues to be on home oxygen at 2 L.   She take Actemra 162 mg SQ once a week and OFEV 150 mg BID.  Shortness of breath is stable per patient.  Continue with Actemra and Ofev at the same doses for now.  CT chest showed improvement in 8/2023 and echo showed improvement of RVSP in 8/2023. She was not able to do PFT's in  8/2022.   - Never tired cellcept in the past.   - restarted Plaquenil 200 mg b.i.d..  Placed Ophthalmology referral for eye exam on Plaquenil,   Scheduled to see them in November 2023.  -   Lab work revealed elevated LFTs, muscle enzyme levels and GED.  Could be related to recent URI she had versus antisynthetase flare versus medication side effect.  Repeat labs today.  If LFTs continue to be elevated we will hold the medications for few weeks and re-evaluate.    2.+ RF rheumatoid arthritis. H/o inflammatory pattern of joint pain. Continue Actemra.     3. 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 On Ofev and Actmera. Reports significant symptomatic improvement and increase ability to do ADLs after initiation of Ofev. On 2LNC, stable, no progression in lung disease in CT scan of the chest done in October 2022. Unable to perform PFT. CT chest showed improvement in 8/2023 and echo showed improvement of RVSP in 8/2023. Continue follow-up with Pulmonary. She is scheduled for lung transplant eval tomorrow.     4. Pulmonary hypertension. 07/20/2022 echocardiogram showed EF normal, 60%.  Low normal right ventricular systolic function.  PASP 62 mmHg.  Moderate pulmonary hypertension.  Moderate pulmonary regurgitation.  She follows with Cardiology at [facility].    Repeat echocardiogram in August 2023 showed improvement of PASP.  No need to see pulmonary hypertension specialist.    5. Hx of two miscarriages. Both at 4 months gestation.  2/2021:  Beta 2 glycoprotein negative.  Lupus anticoagulant not detected.  Cardiolipin IgM 30, slightly elevated, IgG negative.  7/2023:Phospholipid/cardiolipin IgM 20, weak positive, IgG negative. Beta 2 glycoprotein negative. Lupus anticoagulant not detected. Takes ASA daily.    6. Rash: Dry macular rash on face around nose and papular rash on back of neck. Photoprotection discussed.  Send prescription for topical triamcinolone cream.    7. HTN. Well-controlled, follow-up with PCP.      8. High  Risk Med Use. Persons with rheumatoid arthritis, lupus, psoriatic arthritis and other autoimmune diseases are at increased risk of cardiovascular disease including heart attack and stroke. We recommend that all patients with these conditions have annual health maintenance exams including lipid measurements, blood pressure measurements, and smoking cessation counseling when applicable at their primary care provider's office.   -Advised to stay up-to-date on age appropriate vaccinations and malignancy screening, including yearly skin exams.    - PCV 13 in February 2021. Discussed about Pneumovax, she will get it in next visit as she has to go [facility] eval.    Follow up for as scheduled. . In addition to their scheduled follow up, the patient has also been instructed to follow up on as needed basis.        Total time spent with patient and documentation is more than 40 minutes. All questions were answered to patient's satisfaction and patient verbalized understanding.

## 2023-08-31 ENCOUNTER — DOCUMENTATION ONLY (OUTPATIENT)
Dept: TRANSPLANT | Facility: CLINIC | Age: 48
End: 2023-08-31
Payer: MEDICARE

## 2023-08-31 ENCOUNTER — TELEPHONE (OUTPATIENT)
Dept: RHEUMATOLOGY | Facility: CLINIC | Age: 48
End: 2023-08-31
Payer: MEDICARE

## 2023-08-31 DIAGNOSIS — R79.89 ELEVATED LFTS: Primary | ICD-10-CM

## 2023-08-31 NOTE — TELEPHONE ENCOUNTER
LFT's continue to be elevated but not very high compared to past. Ask her to hold Actemra and OFEV for few weeks and will repepat CMP. Labs to be done on 9/14/23.

## 2023-08-31 NOTE — PROGRESS NOTES
Patient chart reviewed, note patient has not arrived for appointments. BMI on echo 8/14/23 = 40.4. information routed to provider/primary coordinator. No show for appointments today. Letter sent to patient, noting deadline date of authorization without extension option due to insurance type.

## 2023-08-31 NOTE — LETTER
August 31, 2023    Ofelia Brady  [address] [place] [id number]          Dear Ofelia QUEVEDO Jose Antonio:    Patient: Ofelia Brady   MR Number: [id number]   YOB: 1975     We hope this letter finds you well. You missed your appointment in the lung transplant department that was scheduled on August 31, 2023. Please contact us at [phone] to re-schedule your appointment. Please note that after November 7, 2023 our transplant team will not have authorization for visit per your insurance guidelines; services will not be covered. Please contact us with any questions or to reschedule.                                                                                Sincerely,     Letitia Stubbs D.O.  Medical Director, Lung Transplant  Pulmonary & Critical Care Medicine  [facility]  [address] [place] [id number]  [phone] tel  [phone] fax    CS/fmk

## 2023-09-05 ENCOUNTER — TELEPHONE (OUTPATIENT)
Dept: PULMONOLOGY | Facility: CLINIC | Age: 48
End: 2023-09-05
Payer: MEDICARE

## 2023-09-05 NOTE — TELEPHONE ENCOUNTER
----- Message from Alina Boyce sent at 9/5/2023  9:45 AM CDT -----  Regarding: Medication Management  Pt is in need of a call in regards to getting a order for OXYGEN EQUIPMENT. Pt can be reached at [phone]    Thank You

## 2023-09-05 NOTE — TELEPHONE ENCOUNTER
Called and spoke with patient. She stated she is switching DME companies for her oxygen because [employer] in [place] no longer accepts her insurance. Patient stated she has already reached out to [facility] in [place] and they do accept her insurance.    Called and spoke to Kassidy at [facility]. She stated a 6 minute walk test, recent office notes, and rx is needed in order to process request.    Patient is scheduled for 11/13/2023 in Pulmonology clinic. Will work on getting patient a sooner appointment.

## 2023-09-06 NOTE — TELEPHONE ENCOUNTER
Called and spoke with patient to schedule her for 09/19/2023 at 3pm. Patient voiced understanding. Will  move patient's appt up from 11/13/2023 to 09/19/2023.

## 2023-09-13 NOTE — TELEPHONE ENCOUNTER
Attempted to contact pt to remind her to complete labs on 9/14/2023 unable to leave a voicemail for a call back

## 2023-09-14 NOTE — TELEPHONE ENCOUNTER
Spoke with pt she will have labs done on 9/19/2023 because she has an appt here that day and will get it done during her appt.

## 2023-09-19 ENCOUNTER — OFFICE VISIT (OUTPATIENT)
Dept: PULMONOLOGY | Facility: CLINIC | Age: 48
End: 2023-09-19
Payer: MEDICARE

## 2023-09-19 VITALS
DIASTOLIC BLOOD PRESSURE: 85 MMHG | TEMPERATURE: 98 F | HEART RATE: 98 BPM | OXYGEN SATURATION: 100 % | SYSTOLIC BLOOD PRESSURE: 128 MMHG

## 2023-09-19 DIAGNOSIS — J84.9 ILD (INTERSTITIAL LUNG DISEASE): Primary | ICD-10-CM

## 2023-09-19 PROCEDURE — 99214 PR OFFICE/OUTPT VISIT, EST, LEVL IV, 30-39 MIN: ICD-10-PCS | Mod: S$PBB,NTX,, | Performed by: INTERNAL MEDICINE

## 2023-09-19 PROCEDURE — 99212 OFFICE O/P EST SF 10 MIN: CPT | Mod: PBBFAC,TXP

## 2023-09-19 PROCEDURE — 3074F SYST BP LT 130 MM HG: CPT | Mod: CPTII,NTX,, | Performed by: INTERNAL MEDICINE

## 2023-09-19 PROCEDURE — 1159F MED LIST DOCD IN RCRD: CPT | Mod: CPTII,NTX,, | Performed by: INTERNAL MEDICINE

## 2023-09-19 PROCEDURE — 3079F DIAST BP 80-89 MM HG: CPT | Mod: CPTII,NTX,, | Performed by: INTERNAL MEDICINE

## 2023-09-19 PROCEDURE — 99214 OFFICE O/P EST MOD 30 MIN: CPT | Mod: S$PBB,NTX,, | Performed by: INTERNAL MEDICINE

## 2023-09-19 PROCEDURE — 3079F PR MOST RECENT DIASTOLIC BLOOD PRESSURE 80-89 MM HG: ICD-10-PCS | Mod: CPTII,NTX,, | Performed by: INTERNAL MEDICINE

## 2023-09-19 PROCEDURE — 1159F PR MEDICATION LIST DOCUMENTED IN MEDICAL RECORD: ICD-10-PCS | Mod: CPTII,NTX,, | Performed by: INTERNAL MEDICINE

## 2023-09-19 PROCEDURE — 3066F PR DOCUMENTATION OF TREATMENT FOR NEPHROPATHY: ICD-10-PCS | Mod: CPTII,NTX,, | Performed by: INTERNAL MEDICINE

## 2023-09-19 PROCEDURE — 3066F NEPHROPATHY DOC TX: CPT | Mod: CPTII,NTX,, | Performed by: INTERNAL MEDICINE

## 2023-09-19 PROCEDURE — 3074F PR MOST RECENT SYSTOLIC BLOOD PRESSURE < 130 MM HG: ICD-10-PCS | Mod: CPTII,NTX,, | Performed by: INTERNAL MEDICINE

## 2023-09-19 NOTE — PROGRESS NOTES
[facility] PULMONARY MEDICINE  OUTPATIENT OFFICE VISIT NOTE    SUBJECTIVE:      HPI: Ofelia Brady is a 48 y.o. yo female w/ significant PMH of ILD, who presents today for F/U. She states having shortness of breath most of the time, especially upon awakening. Currently, she can only walk for less than 10 feet before needing a break; she continues to have need oxygen via NC at 2 L/min. No significant changes compared to last visit. Uses rescue inhaler 2-3x daily. She states at baseline at this time. Pt is a never smoker.     ROS:  (+) Shortness of breath  (-) Chest pain, palpitations, SOB, fever, night sweats, chills, diarrhea, constipation.       OBJECTIVE:     Vital signs:   /85 (BP Location: Left arm, Patient Position: Sitting)   Pulse 98   Temp 98.1 °F (36.7 °C) (Oral)   SpO2 100% Comment: on 2L/minute per NC     Physical Examination:  General: Obese w/ respiratory distress  HEENT: NC/AT; PERRL; nasal and oral mucosa moist and clear  Neck: Full ROM; no lymphadenopathy  Pulm: Fine crackles bilaterally, more prominent in upper lobes; normal work of breathing on oxygen at 2L/min via NC  CV: S1, S2 w/o murmurs or gallops; no edema noted; mild cyanosis in the lips  GI: Soft with normal bowel sounds in all quadrants, no masses on palpation  MSK: Full ROM of all extremities and spine w/o limitation or discomfort  Derm: Rashes and dry skin in extremities   Neuro: AAOx4; motor/sensory function intact  Psych: Cooperative; appropriate mood and affect    Significant findings:  10/19/2022 - CT chest w/o contrast shows bilateral ground-glass interstitial infiltrate with associated interstitial fibrosis in the periphery of the lungs bilaterally; cystic changes are seen in the periphery of the lungs bilaterally  4/29/2021 - High res CT chest shows increased bilateral groundglass and reticular opacities.  8/14/23 high res CT:  Lung consolidations seen on the prior high-resolution study dated 04/29/2021   On the current  study, there is extensive septal thickening with architectural distortion and bronchiectasis with sparing of the lung apices.  There is only faint basilar predominant ground-glass attenuation which may be related to extensive septal thickening. Suggesting progression of antisynthetase syndrome with fibrosis although ground glass opacities somewhat improved.         ASSESSMENT & PLAN:     ILD   Oxygen dependence  Severe pulmonary fibrosis with resulting chronic hypoxic respiratory failure.   -CT chest w/o contrast 10/19/2022 shows bilateral ground-glass interstitial infiltrate with associated interstitial fibrosis in the periphery of the lungs bilaterally; cystic changes are seen in the periphery of the lungs bilaterally  CT chest high rest 8/14/23 shows progression of disease.   -Continues to use home oxygen at 2L/min, she is compliant with this, receives benefit and should continue using it as prescribed.   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 On actemra, plaquenil OFEV        Return to clinic in 6 months.     Hammad Garcia DO  [facility] Internal Medicine, [facility]

## 2023-09-20 ENCOUNTER — TELEPHONE (OUTPATIENT)
Dept: RHEUMATOLOGY | Facility: CLINIC | Age: 48
End: 2023-09-20
Payer: MEDICARE

## 2023-09-20 DIAGNOSIS — R21 RASH: Primary | ICD-10-CM

## 2023-09-20 DIAGNOSIS — J84.9 ILD (INTERSTITIAL LUNG DISEASE): Primary | ICD-10-CM

## 2023-09-20 RX ORDER — TRIAMCINOLONE ACETONIDE 1 MG/G
CREAM TOPICAL
Qty: 60 G | Refills: 6 | Status: SHIPPED | OUTPATIENT
Start: 2023-09-20

## 2023-09-20 NOTE — TELEPHONE ENCOUNTER
Liver function tests finally normalized, she can restart taking Actemra pen and will decrease the dose of the Ofev to 100 mg b.i.d..  I will send a new prescription for Ofev to [facility] specialty pharmacy.    She can restart actemra now and wait for new rx of OFEV with lower dose form pharmacy.

## 2023-09-20 NOTE — PROGRESS NOTES
Patient currently on O2 2L/min per NC, continuous--O2 sat at rest was 100%. Need to perform Walk Test for Sutus, to continue to receive O2.     Patient took off O2--Pulse Ox reading dropped to 81% in less than 30 seconds of taking off O2. Increased to 85-86% at rest on room air after 3 minutes of removing O2 and remained stable,between 85-86%, at rest on room air for the remaining 3 minutes. Per testing parameters, if O2 sat at rest less than 88%--do not perform walk.     Patient placed O2 at 2L/min per NC continuous back on and O2 returned to 100% at rest within 1-2 minutes.      used

## 2023-09-20 NOTE — TELEPHONE ENCOUNTER
Spoke with Pharmacist Keiko from [facility] spec clarification given to change Ofev to 100 mg BID per .

## 2023-09-20 NOTE — TELEPHONE ENCOUNTER
----- Message from Sonal Montano sent at 9/20/2023  9:54 AM CDT -----  Regarding: Please Advise  Susannah called from [facility] regarding the medication nintedanib (OFEV) 100 mg Cap.   Susannah is wanting verification of the dosage change and can be reached at [phone]    Please Advise

## 2023-10-03 ENCOUNTER — TELEPHONE (OUTPATIENT)
Dept: PULMONOLOGY | Facility: CLINIC | Age: 48
End: 2023-10-03
Payer: MEDICARE

## 2023-10-03 NOTE — TELEPHONE ENCOUNTER
Returned Marichuy's call. She stated that patient may be a candidate for Afflo Vest Therapy for her bronchiectasis. Marichuy also mentioned that they can also assist in getting patient set up for a Trilogy machine. Informed her that I will inform Pulmonologist and to what their recommendations are. Marichuy voiced understanding.

## 2023-10-03 NOTE — TELEPHONE ENCOUNTER
----- Message from Yvette Frias sent at 10/2/2023 11:39 AM CDT -----  Marichuy with [employer] [phone] questions about Trilogy.

## 2023-10-04 NOTE — TELEPHONE ENCOUNTER
Spoke with Dr. Kuhn in regards to what Marichuy recommended. Dr. Kuhn stated he does not recommend the Afflo Vest at this time but patient can be set up with a Trilogy if insurance approves.    Called and spoke with Marichuy. Informed her of what Dr. Kuhn stated. Marichuy voiced understanding and stated that a statement is needed in the last note stating the need for Trilogy machine. Will get with provider to add in the statement.

## 2023-10-19 NOTE — TELEPHONE ENCOUNTER
Dr. Montaño reviewed the patient's record in order to complete addendum to state need for patient's Trilogy machine. Dr. Montaño stated that patient would not qualify for a Trilogy machine at this time    Called and spoke with Yamile at [facility]. Informed her of what Dr. Montaño stated. She asked when was the last time patient had an ABG performed. Informed her that last ABG is documented in 2021 with a pCO2 38. Yamile verbalized understanding and will make a note in patient's record. She encouraged for the clinic to contact them if needed in the future. Understanding voiced.

## 2023-11-03 ENCOUNTER — OFFICE VISIT (OUTPATIENT)
Dept: NEPHROLOGY | Facility: CLINIC | Age: 48
End: 2023-11-03
Payer: MEDICARE

## 2023-11-03 VITALS
HEART RATE: 84 BPM | TEMPERATURE: 98 F | BODY MASS INDEX: 35.27 KG/M2 | DIASTOLIC BLOOD PRESSURE: 82 MMHG | SYSTOLIC BLOOD PRESSURE: 140 MMHG | OXYGEN SATURATION: 98 % | WEIGHT: 152.38 LBS | RESPIRATION RATE: 18 BRPM | HEIGHT: 55 IN

## 2023-11-03 DIAGNOSIS — E83.42 HYPOMAGNESEMIA: ICD-10-CM

## 2023-11-03 DIAGNOSIS — E55.9 VITAMIN D DEFICIENCY, UNSPECIFIED: ICD-10-CM

## 2023-11-03 DIAGNOSIS — Z23 NEED FOR VACCINATION: ICD-10-CM

## 2023-11-03 DIAGNOSIS — Z87.448 HISTORY OF ACUTE RENAL FAILURE: Primary | ICD-10-CM

## 2023-11-03 PROCEDURE — G0008 ADMIN INFLUENZA VIRUS VAC: HCPCS | Mod: PBBFAC

## 2023-11-03 PROCEDURE — 99215 OFFICE O/P EST HI 40 MIN: CPT | Mod: PBBFAC | Performed by: INTERNAL MEDICINE

## 2023-11-03 NOTE — PROGRESS NOTES
Nephrology   CLINIC NOTE    Patient Name: Ofelia Brady  YOB: 1975  Chief Complaint: No chief complaint on file.     PRESENTING HISTORY   History of Present Illness:  Ms. Ofelia Brady is a 48 y.o. 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     Patient established in renal clinic on 22 s/p hospital admission for acute renal failure that required HD with recovery in renal function. Renal biopsy done of 2022 with no significant findings. Last seen in 2023 with full recovery of renal function and improved lower extremity edema.    Today, patient reports she is feeling well without complaint of lower extremity edema or SOB. Remains on 2L NC at home. States she needs a new referral to lung transplant, advised to discuss this with Pulm Clinic.    12 point review of symptoms negative unless otherwise stated above    PAST HISTORY:     Past Medical History:   Diagnosis Date    Antisynthetase syndrome     HTN (hypertension)     Interstitial lung disease     Obesity, unspecified     Rheumatoid arthritis, unspecified         Past Surgical History:   Procedure Laterality Date     SECTION      X5    RENAL BIOPSY         Family History   Problem Relation Age of Onset    Hypertension Mother     Diabetes Mother     Heart disease Mother     Hypertension Father     Diabetes Father     Heart disease Sister        Social History     Socioeconomic History    Marital status: Single   Tobacco Use    Smoking status: Never    Smokeless tobacco: Never   Substance and Sexual Activity    Alcohol use: Never    Drug use: Never    Sexual activity: Not Currently       MEDICATIONS:     Current Outpatient Medications   Medication Instructions    albuterol (PROVENTIL) 2.5 mg /3 mL (0.083 %) nebulizer solution 3 mLs, Nebulization, Every 8 hours PRN    albuterol (PROVENTIL/VENTOLIN HFA) 90  mcg/actuation inhaler 2 puffs, Inhalation, Every 6 hours    albuterol-ipratropium (DUO-NEB) 2.5 mg-0.5 mg/3 mL nebulizer solution 3 mLs, Every 8 hours PRN    amLODIPine (NORVASC) 10 mg, Oral, Daily    aspirin (ECOTRIN) 81 mg, Oral, Daily, After lunch    budesonide-formoterol 160-4.5 mcg (SYMBICORT) 160-4.5 mcg/actuation HFAA 1 puff, Inhalation, Daily PRN    cimetidine (TAGAMET) 800 mg, Oral, Nightly    diclofenac sodium (VOLTAREN) 1 % Gel 1 Tube, Topical (Top), Every 6 hours PRN    ergocalciferol (ERGOCALCIFEROL) 50,000 unit Cap 1 capsule, Oral, Daily    ezetimibe (ZETIA) 10 mg, Oral, Nightly    famotidine (PEPCID) 20 mg, Oral, Every 12 hours    fluticasone propionate (FLOVENT DISKUS) 250 mcg/actuation DsDv 1 puff, Inhalation, Daily    furosemide (LASIX) 20 mg, Oral, Daily PRN    gabapentin (NEURONTIN) 300 mg, Oral, 2 times daily    hydroxychloroquine (PLAQUENIL) 200 mg, Oral, 2 times daily    ibuprofen (ADVIL,MOTRIN) 800 mg, Oral, 3 times daily PRN    loratadine (CLARITIN) 10 mg, Oral, Daily    meloxicam (MOBIC) 15 mg, Oral, Daily, After lunch    metoprolol tartrate (LOPRESSOR) 100 mg, Oral, 2 times daily    NIFEdipine (PROCARDIA-XL) 30 mg, Oral, Daily    omeprazole (PRILOSEC) 20 mg, Oral, Daily    ondansetron (ZOFRAN-ODT) 4 mg, Oral, Every 6 hours PRN    OXYGEN-AIR DELIVERY SYSTEMS MISC Inhalation    pantoprazole (PROTONIX) 40 mg, Oral, Every morning    traMADoL (ULTRAM) 50 mg, Oral, Every 6 hours PRN    triamcinolone acetonide 0.1% (KENALOG) 0.1 % cream APPLY  CREAM EXTERNALLY TO AFFECTED AREA TWICE DAILY AS NEEDED FOR  RASH        OBJECTIVE:   Vital Signs:  There were no vitals filed for this visit.       Physical Exam:  General: No acute distress. In wheelchair. Supplemental oxygen noted  Psychiatric: Mood and affect normal  HEENT: Normocephalic, atraumatic. Face symmetric.  Cardiovascular: Regular rate .  Pulmonary: Bilateral symmetric chest rise. Non-labored, no wheezes, rhonchi or crackles are appreciated.  On 2L NC  Abdominal:  Soft, nontender, nondistended  Extremities: No lower extremity edema   Skin:  Exposed skin is warm & dry.  Neuro: Alert and oriented      Laboratory:  Lab Results   Component Value Date     09/19/2023    K 3.9 09/19/2023    CO2 27 09/19/2023    BUN 5.7 (L) 09/19/2023    CREATININE 0.82 09/19/2023    CALCIUM 10.0 09/19/2023    BILIDIR 0.2 07/15/2021    IBILI 0.30 07/15/2021    ALKPHOS 71 09/19/2023    AST 14 09/19/2023    ALT 7 09/19/2023    MG 1.40 (L) 07/28/2022    PHOS 3.7 07/28/2022        Lab Results   Component Value Date    WBC 13.03 (H) 09/19/2023    RBC 4.29 09/19/2023    HGB 11.7 (L) 09/19/2023    HCT 38.1 09/19/2023    MCV 88.8 09/19/2023    MCH 27.3 09/19/2023    MCHC 30.7 (L) 09/19/2023    RDW 14.4 09/19/2023     09/19/2023    MPV 10.0 09/19/2023      Pathology:            Left Kidney Biopsy 7/26/2022  Final Diagnosis   1. Left kidney, needle biopsy:   Acute Tubular Injury (limited Sample).  See comment.     Comment: The limited biopsy sample contains only one glomerulus for routine light microscopy.  Immunofluorescence evaluation is negative for an immune complex-mediated process.         Chronicity Summary   Total Glomeruli- 4   Global Glomerulosclerosis- 0   Segmental Sclerosis- Absent   Interstitial Fibrosis- Mild   Tubular Atrophy- Mild   Arterial Intimal Fibrosis- N/A   Arteriolar Hyalinosis- Absent      This case was seen in consultation by SABI Rodriguez M.D. at [facility] in [place], [place].       ASSESSMENT & PLAN:     Acute Kidney Injury - Resolved as of 5/2023  Low Vitamin D  Hypomagnesemia  -renal biopsy limited but no significant findings or concern for immune-complex mediated process  -most recent labs in 9/2023 show normal renal function  -vitamin D of 8.6, magnesium of 1.40 in 7/2022 during hospital stay  -recommended vitamin D and magnesium supplements over the counter previously  -follow up with PCP regarding vitamin D and magnesium  levels, management    -Follow low sodium diet (2 grams a day)  -Control diabetes (goal A1C <7.0%)  -Control high blood pressure ( goal BP < 130/80, please record BP at home every day and bring log to next office visit)  -Exercise at least 30 minutes a day, 5 days a week.  -Maintain healthy weight.  -Decrease or stop alcohol use  -Do not smoke  -Stay well hydrated  -Receive Pneumovax, Flu, and HBV vaccines if indicated.  -Do not take NSAIDs (Ibuprofen, Naproxen, Aleve, Advil, Toradol, Mobic), may take only Tylenol as needed for pain/headaches.  -Take cholesterol-lowering medications as prescribed (LDL goal <100)    HTN  -/82 today   -continue current BP medications  -further management per PCP    Lower extremity edema - resolved   SOB - resolved  -likely due to underlying autoimmune disease   -continue lasix 20mg PRN for worsening SOB and/or swelling  -reports uses lasix about once per week for minimal lower extremity swelling  -discussed taking in moderation as to not affect renal function.   -continue to follow with pulmonology and rheumatology       RTC in 7 months with labs (CMP, vitamin D, magnesium).      Gretta Cortes MD  [facility], [facility]  11/3/2023

## 2023-11-06 ENCOUNTER — OFFICE VISIT (OUTPATIENT)
Dept: RHEUMATOLOGY | Facility: CLINIC | Age: 48
End: 2023-11-06
Payer: MEDICARE

## 2023-11-06 VITALS
HEART RATE: 71 BPM | SYSTOLIC BLOOD PRESSURE: 147 MMHG | TEMPERATURE: 98 F | RESPIRATION RATE: 20 BRPM | OXYGEN SATURATION: 95 % | HEIGHT: 55 IN | WEIGHT: 151.38 LBS | DIASTOLIC BLOOD PRESSURE: 90 MMHG | BODY MASS INDEX: 35.03 KG/M2

## 2023-11-06 DIAGNOSIS — M05.79 SEROPOSITIVE RHEUMATOID ARTHRITIS OF MULTIPLE SITES: ICD-10-CM

## 2023-11-06 DIAGNOSIS — Z79.899 HIGH RISK MEDICATION USE: ICD-10-CM

## 2023-11-06 DIAGNOSIS — R21 RASH: ICD-10-CM

## 2023-11-06 DIAGNOSIS — I27.20 PULMONARY HTN: ICD-10-CM

## 2023-11-06 DIAGNOSIS — R79.89 ELEVATED LFTS: ICD-10-CM

## 2023-11-06 DIAGNOSIS — Z23 NEED FOR PNEUMOCOCCAL VACCINE: ICD-10-CM

## 2023-11-06 DIAGNOSIS — D89.89 ANTISYNTHETASE SYNDROME: Primary | ICD-10-CM

## 2023-11-06 DIAGNOSIS — R74.8 ELEVATED ALDOLASE LEVEL: ICD-10-CM

## 2023-11-06 DIAGNOSIS — J84.9 ILD (INTERSTITIAL LUNG DISEASE): ICD-10-CM

## 2023-11-06 DIAGNOSIS — Z99.81 OXYGEN DEPENDENT: ICD-10-CM

## 2023-11-06 PROCEDURE — 1159F PR MEDICATION LIST DOCUMENTED IN MEDICAL RECORD: ICD-10-PCS | Mod: CPTII,,, | Performed by: INTERNAL MEDICINE

## 2023-11-06 PROCEDURE — 1159F MED LIST DOCD IN RCRD: CPT | Mod: CPTII,,, | Performed by: INTERNAL MEDICINE

## 2023-11-06 PROCEDURE — 3008F PR BODY MASS INDEX (BMI) DOCUMENTED: ICD-10-PCS | Mod: CPTII,,, | Performed by: INTERNAL MEDICINE

## 2023-11-06 PROCEDURE — 3080F PR MOST RECENT DIASTOLIC BLOOD PRESSURE >= 90 MM HG: ICD-10-PCS | Mod: CPTII,,, | Performed by: INTERNAL MEDICINE

## 2023-11-06 PROCEDURE — 99215 PR OFFICE/OUTPT VISIT, EST, LEVL V, 40-54 MIN: ICD-10-PCS | Mod: S$PBB,,, | Performed by: INTERNAL MEDICINE

## 2023-11-06 PROCEDURE — 3066F NEPHROPATHY DOC TX: CPT | Mod: CPTII,,, | Performed by: INTERNAL MEDICINE

## 2023-11-06 PROCEDURE — 3008F BODY MASS INDEX DOCD: CPT | Mod: CPTII,,, | Performed by: INTERNAL MEDICINE

## 2023-11-06 PROCEDURE — 3077F PR MOST RECENT SYSTOLIC BLOOD PRESSURE >= 140 MM HG: ICD-10-PCS | Mod: CPTII,,, | Performed by: INTERNAL MEDICINE

## 2023-11-06 PROCEDURE — 99215 OFFICE O/P EST HI 40 MIN: CPT | Mod: S$PBB,,, | Performed by: INTERNAL MEDICINE

## 2023-11-06 PROCEDURE — 90677 PCV20 VACCINE IM: CPT | Mod: PBBFAC

## 2023-11-06 PROCEDURE — 99215 OFFICE O/P EST HI 40 MIN: CPT | Mod: PBBFAC | Performed by: INTERNAL MEDICINE

## 2023-11-06 PROCEDURE — 3080F DIAST BP >= 90 MM HG: CPT | Mod: CPTII,,, | Performed by: INTERNAL MEDICINE

## 2023-11-06 PROCEDURE — 3066F PR DOCUMENTATION OF TREATMENT FOR NEPHROPATHY: ICD-10-PCS | Mod: CPTII,,, | Performed by: INTERNAL MEDICINE

## 2023-11-06 PROCEDURE — 3077F SYST BP >= 140 MM HG: CPT | Mod: CPTII,,, | Performed by: INTERNAL MEDICINE

## 2023-11-06 RX ORDER — HYDROXYCHLOROQUINE SULFATE 200 MG/1
200 TABLET, FILM COATED ORAL 2 TIMES DAILY
Qty: 60 TABLET | Refills: 6 | Status: SHIPPED | OUTPATIENT
Start: 2023-11-06 | End: 2023-12-06

## 2023-11-06 RX ORDER — UBIQUINOL 100 MG
CAPSULE ORAL
COMMUNITY
Start: 2023-10-17

## 2023-11-06 RX ORDER — TOCILIZUMAB 180 MG/ML
INJECTION, SOLUTION SUBCUTANEOUS
COMMUNITY
Start: 2023-10-17 | End: 2023-11-06 | Stop reason: SDUPTHER

## 2023-11-06 RX ORDER — TOCILIZUMAB 180 MG/ML
1 INJECTION, SOLUTION SUBCUTANEOUS
Qty: 14 EACH | Refills: 1 | Status: SHIPPED | OUTPATIENT
Start: 2023-11-06 | End: 2024-01-24 | Stop reason: SDUPTHER

## 2023-11-06 NOTE — PROGRESS NOTES
Patient ID: [id number]     Chief Complaint: No chief complaint on file.      HPI:     Ofelia Brady is a 48 y.o. female here today for follow up of anti synthetase syndrome.     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 She is on continuous O2 2L per NC and 4L on CPAP at night. She was treated with steroid in past. Patient is on Actemra and Ofev.    She take Actemra 162 mg SQ once a week and OFEV 100 mg BID.  Lowered Ofev dose due to elevated LFTs.  Admits SOB with exertion, stable. Not getting worse. No cough. Follows with pulmonary and cardiology here.   Admits h/o rash on face and neck, worse in sun.  No rashes today, rash on back and face has improved with the topical steroid cream.  Continues to have mild fatigue and tiredness.    Wrist pain on right and intermittent right knee pain. No red, warm and swollen joints. Morning stiffness for an hour.    Denies history of fevers, photosensitivity, oral or nasal ulcers, h/o MI, stroke, seizures, h/o PE or DVT, Raynaud's phenomenon, uveitis, malignancies.   Family history of autoimmune disease: none.  Pregnancies: 7  Miscarriages: 2, both in 4th month.  Smoking: nonsmoker.       Social History     Tobacco Use   Smoking Status Never   Smokeless Tobacco Never          ----------------------------  Antisynthetase syndrome  HTN (hypertension)  Interstitial lung disease  Obesity, unspecified  Rheumatoid arthritis, unspecified     Past Surgical History:   Procedure Laterality Date     SECTION      X5    RENAL BIOPSY         Review of patient's allergies indicates:  No Known Allergies    Outpatient Medications Marked as Taking for the 23 encounter (Office Visit) with Sarah Starr MD   Medication Sig Dispense Refill    albuterol (PROVENTIL) 2.5 mg /3 mL (0.083 %) nebulizer solution Take 3 mLs by nebulization every 8 (eight) hours as needed.      albuterol  (PROVENTIL/VENTOLIN HFA) 90 mcg/actuation inhaler Inhale 2 puffs into the lungs every 6 (six) hours.      albuterol-ipratropium (DUO-NEB) 2.5 mg-0.5 mg/3 mL nebulizer solution 3 mLs every 8 (eight) hours as needed.      ALCOHOL PREP PADS PadM SMARTSIG:Pledget(s) Topical As Directed      amLODIPine (NORVASC) 10 MG tablet Take 10 mg by mouth once daily.      aspirin (ECOTRIN) 81 MG EC tablet Take 1 tablet (81 mg total) by mouth once daily. After lunch (Patient taking differently: Take 81 mg by mouth once daily. After lunch, sometimes) 30 tablet 5    budesonide-formoterol 160-4.5 mcg (SYMBICORT) 160-4.5 mcg/actuation HFAA Inhale 1 puff into the lungs daily as needed.      cimetidine (TAGAMET) 800 MG tablet Take 800 mg by mouth every evening.      diclofenac sodium (VOLTAREN) 1 % Gel Apply 1 Tube topically every 6 (six) hours as needed.      ergocalciferol (ERGOCALCIFEROL) 50,000 unit Cap Take 1 capsule by mouth every 7 days.      ezetimibe (ZETIA) 10 mg tablet Take 10 mg by mouth every evening.      famotidine (PEPCID) 20 MG tablet Take 20 mg by mouth every 12 (twelve) hours.      fluticasone propionate (FLOVENT DISKUS) 250 mcg/actuation DsDv Inhale 1 puff into the lungs once daily.      furosemide (LASIX) 20 MG tablet Take 1 tablet (20 mg total) by mouth daily as needed (for excessive swelling or shortness of breath). 90 tablet 2    gabapentin (NEURONTIN) 300 MG capsule Take 1 capsule (300 mg total) by mouth 2 (two) times a day. 60 capsule 5    ibuprofen (ADVIL,MOTRIN) 800 MG tablet Take 800 mg by mouth 3 (three) times daily as needed.      loratadine (CLARITIN) 10 mg tablet Take 10 mg by mouth once daily at 6am.      metoprolol tartrate (LOPRESSOR) 100 MG tablet Take 100 mg by mouth 2 (two) times daily.      NIFEdipine (PROCARDIA-XL) 30 MG (OSM) 24 hr tablet Take 1 tablet (30 mg total) by mouth once daily. 30 tablet 11    omeprazole (PRILOSEC) 20 MG capsule Take 20 mg by mouth once daily.      ondansetron  (ZOFRAN-ODT) 4 MG TbDL Take 4 mg by mouth every 6 (six) hours as needed.      OXYGEN-AIR DELIVERY SYSTEMS MISC Inhale into the lungs.      pantoprazole (PROTONIX) 40 MG tablet Take 40 mg by mouth every morning.      traMADoL (ULTRAM) 50 mg tablet Take 50 mg by mouth every 6 (six) hours as needed.      triamcinolone acetonide 0.1% (KENALOG) 0.1 % cream APPLY  CREAM EXTERNALLY TO AFFECTED AREA TWICE DAILY AS NEEDED FOR  RASH 60 g 6    [DISCONTINUED] ACTEMRA ACTPEN 162 mg/0.9 mL PnIj Inject into the skin.      [DISCONTINUED] hydrOXYchloroQUINE (PLAQUENIL) 200 mg tablet Take 200 mg by mouth 2 (two) times daily.         Social History     Socioeconomic History    Marital status: Single   Tobacco Use    Smoking status: Never    Smokeless tobacco: Never   Substance and Sexual Activity    Alcohol use: Never    Drug use: Never    Sexual activity: Not Currently        Family History   Problem Relation Age of Onset    Hypertension Mother     Diabetes Mother     Heart disease Mother     Hypertension Father     Diabetes Father     Heart disease Sister         Immunization History   Administered Date(s) Administered    Influenza - Quadrivalent - PF *Preferred* (6 months and older) 11/01/2022, 11/03/2023    Pneumococcal Conjugate - 13 Valent 02/04/2021    Pneumococcal Conjugate - 20 Valent 11/06/2023       Patient Care Team:  Chaitanya Cosme NP as PCP - General (Family Medicine)  Evie Richter MD as Consulting Physician (Nephrology)     Subjective:     Constitutional:  Denies chills. Denies fever. Denies night sweats. Denies weight loss.   Ophthalmology: Denies blurred vision. Denies dry eyes. Denies eye pain. Denies Itching and redness.   ENT: Denies oral ulcers. Denies epistaxis. Denies dry mouth. Denies swollen glands.   Endocrine: Denies diabetes. Denies thyroid Problems.   Respiratory: Denies cough. Denies shortness of breath. Admits shortness of breath with exertion. Denies hemoptysis.   Cardiovascular: Denies  "chest pain at rest. Denies chest pain with exertion. Denies palpitations.    Gastrointestinal: Denies abdominal pain. Admits diarrhea, 4-5 times a day. Denies nausea. Denies vomiting. Denies hematemesis or hematochezia. Denies heartburn.  Genitourinary: Denies blood in urine.  Musculoskeletal: See HPI for details  Integumentary: per HPI  Peripheral Vascular: Denies Ulcers of hands and/or feet. Denies Cold extremities.   Neurologic: Denies dizziness. Denies headache.  Denies loss of strength. Denies numbness or tingling.   Psychiatric: Denies depression. Denies anxiety. Denies suicidal/homicidal ideations.      Objective:     BP (!) 147/90 (BP Location: Left arm, Patient Position: Sitting, BP Method: Small (Automatic))   Pulse 71   Temp 98 °F (36.7 °C) (Oral)   Resp 20   Ht 4' 6" (1.372 m)   Wt 68.7 kg (151 lb 6.4 oz)   SpO2 95%   BMI 36.50 kg/m²     Physical Exam    General Appearance: alert, pleasant, in no acute distress.  Skin: Skin color, texture, turgor normal. No rashes or lesions. H/o dry macular rash on face around nose and papular rash on back of neck. Rash improved.  Eyes:  extraocular movement intact (EOMI), pupils equal, round, reactive to light and accommodation, conjunctiva clear.  ENT: No oral or nasal ulcers.  Neck:  Neck supple. No adenopathy.   Lungs: CTA throughout without crackles, rhonchi, or wheezes.   Heart: RRR w/o murmurs.  No edema.   Abdomen: Soft, non-tender, no masses, rebound or guarding.  Neuro: Alert, oriented, CN II-XII GI, sensory and motor innervation intact.  Musculoskeletal: No active synovitis. Decrease ROM of bilateral wrists, no swelling.  Psych: Alert, oriented, normal eye contact.    Labs:   2020:  Anti CCP negative.  Anca, MPO and PR3 negative.  Rheumatoid factor IgA negative, IgG 22, low titer, IgM 13, low titer.   2/2021:  Beta 2 glycoprotein negative.  Lupus anticoagulant not detected.  Cardiolipin IgM 30, slightly elevated, IgG negative.  Negative RNA polymerase " 3, dsDNA, RNP, SSB, negative Smith, Scl 70, Tarsha 1.  C3, C4 okay.  TPMT gene mutation not detected.  Urine protein creatinine ratio 157.  Positive INES, 1:640, cytoplasmic pattern.  Positive SSA, 117, high titer.  Negative fibrillin, PM/Scl, Tarsha 1.  Myositis panel showed positive PL 12 antibody.  4/2021:  Elevated IgA 380, normal between 87 to 352.  Elevated IgM, 264, normal between 26 to 217.  Normal IgG level.  CPK elevated 456.  7/2022: ANCA negative. RF IgA and anti CCP negative. C3, C4 ok. dsDNA negative. INES 1:160, cytoplasmic pattern.  WBC count 16.4.  Hemoglobin 11.1.  Creatinine 1.07.  CMP okay. No protein and blood in urine.   07/19/2023: WBC count 18.9, higher than before. Neutrophil count 10.13. Potassium 3.1. ALT elevated 76. CRP 44. ESR 55. Negative hepatitis-B, hepatitis-C, HIV. Phospholipid/cardiolipin IgM 20, weak positive, IgG negative. Beta 2 glycoprotein negative. Lupus anticoagulant not detected. QuantiFERON TB negative. CPK normal. 1+ protein and no blood in urine. Upc 200 milligram/gram. C3, C4 ok. Aldolase elevated 18.7.   8/14/23: AST elevated 72, ALT elevated 158. CRP 12.9. ESR 52. CPK normal. Aldolase 8.1, elevated. Kappa lambda light chain ratio normal. GGT elevated 117. SPEP and IFA okay, M spike not observed. No monoclonal bands detected. IgG level elevated 1853, normal between 500-1631. IgA elevated 642, normal between . IgM normal.  08/31/2023; GGT 89, improved.  CBC okay.  WBC count improved.  Platelets 421.  , .   9/19/23:  CMP okay, LFTs normalized.  WBC count elevated 13.03, hemoglobin 11.7.    Imaging:     TTE on 4/27/2021 showed an EF of 60%, borderline concentric LVH. She had mild TR with RVSP of 57mmHg.   07/20/2022 echocardiogram showed EF normal, 60%.  Systolic function normal.  Low normal right ventricular systolic function.  PASP 62 mmHg.  Moderate pulmonary hypertension.  Moderate pulmonary regurgitation.  Echocardiogram 08/14/2023 showed EF 57%, systolic  function normal of both right and left ventricle. PASP 26 mmHg.    4/2021:  CT chest showed worsened appearance of lungs compared to prior in August 2020 with extensive ground-glass and reticular opacities in both lungs.  No convincing honeycombing.  Mild bronchiectasis favoring the lung bases.  CT chest 08/19/2023 showed consolidation seen in April 20, 2021 and October 2022 has resolved. Extensive septal thickening and bronchiectasis with sparing of lung apices. Faint bibasilar predominant ground-glass opacities to extensive septal thickening. There is calcified plaque in LAD  10/19/23:  CT scan of chest showed diffuse bilateral ground-glass interstitial infiltrates associated with interstitial fibrosis in periphery of lungs bilaterally.  Some cystic changes seen in periphery of lungs bilaterally.  No significant change of ILD since April 2021.  Some bronchiectasis seen in upper lobes bilaterally.  No pleural effusion, mediastinum unremarkable, no lymphadenopathy.    7/2022:  Kidney biopsy showed limited sample immunofluorescence negative for immune complex mediated process.  Acute tubular injury.  Mild interstitial fibrosis and atrophy.    Assessment:       ICD-10-CM ICD-9-CM   1. Antisynthetase syndrome  D89.89 279.49   2. Seropositive rheumatoid arthritis of multiple sites  M05.79 714.0   3. Elevated LFTs  R79.89 790.6   4. ILD (interstitial lung disease)  J84.9 515   5. Pulmonary HTN  I27.20 416.8   6. Oxygen dependent  Z99.81 V46.2   7. High risk medication use  Z79.899 V58.69   8. Rash  R21 782.1   9. Elevated aldolase level  R74.8 790.5   10. Need for pneumococcal vaccine  Z23 V03.82            Plan:       1. 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 She continues to be on home oxygen at 2 L.   - She was OFEV 150 mg BID, dose decreased to 100 mg b.i.d. because of elevated LFTs.    - Shortness of breath is stable per patient.    -  Continue with Actemra 162 mg SQ once a week.    CT chest showed improvement in 8/2023 and echo showed improvement of RVSP in 8/2023. She was not able to do PFT's in 8/2022.   - Never tired cellcept in the past.   - restarted Plaquenil 200 mg b.i.d..  Placed Ophthalmology referral for eye exam on Plaquenil, scheduled to see them in November 2023.  -  Labs today.     2.+ RF rheumatoid arthritis. H/o inflammatory pattern of joint pain. Continue Actemra.     3. 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 On Ofev and Actmera. Reports significant symptomatic improvement and increase ability to do ADLs after initiation of Ofev. On 2LNC, stable, no progression in lung disease in CT scan of the chest done in October 2022. Unable to perform PFT. CT chest showed improvement in 8/2023 and echo showed improvement of RVSP in 8/2023. Continue follow-up with Pulmonary. She was referred for lung transplant eval, initially canceled several years ago due to obesity, recently had to be rescheduled due to transportation difficulties, has not seen them yet, advised to follow up with pulmonary.    4. Pulmonary hypertension. 07/20/2022 echocardiogram showed EF normal, 60%.  Low normal right ventricular systolic function.  PASP 62 mmHg.  Moderate pulmonary hypertension.  Moderate pulmonary regurgitation.  She follows with Cardiology at [facility].    Repeat echocardiogram in August 2023 showed improvement of PASP, 26 mmHg. No need to see pulmonary hypertension specialist.    5. Hx of two miscarriages. Both at 4 months gestation.  2/2021:  Beta 2 glycoprotein negative.  Lupus anticoagulant not detected.  Cardiolipin IgM 30, slightly elevated, IgG negative.  7/2023:Phospholipid/cardiolipin IgM 20, weak positive, IgG negative. Beta 2 glycoprotein negative. Lupus anticoagulant not detected. Takes ASA daily.    6. Rash: H/o dry macular rash on face around nose and papular rash on back of neck. Photoprotection discussed.  C/w topical triamcinolone cream  as needed. No rash now.    7. HTN. Well-controlled, follow-up with PCP.      8. High Risk Med Use. Persons with rheumatoid arthritis, lupus, psoriatic arthritis and other autoimmune diseases are at increased risk of cardiovascular disease including heart attack and stroke. We recommend that all patients with these conditions have annual health maintenance exams including lipid measurements, blood pressure measurements, and smoking cessation counseling when applicable at their primary care provider's office.   -Advised to stay up-to-date on age appropriate vaccinations and malignancy screening, including yearly skin exams.    - PCV 13 in February 2021. Pneumovax 20 11/6/23.     Follow up in about 3 months (around 1/24/2024) for at 2:30. . In addition to their scheduled follow up, the patient has also been instructed to follow up on as needed basis.      Total time spent with patient and documentation is more than 40 minutes. All questions were answered to patient's satisfaction and patient verbalized understanding.

## 2023-11-10 ENCOUNTER — TELEPHONE (OUTPATIENT)
Dept: RHEUMATOLOGY | Facility: CLINIC | Age: 48
End: 2023-11-10
Payer: MEDICARE

## 2023-11-20 ENCOUNTER — TELEPHONE (OUTPATIENT)
Dept: PULMONOLOGY | Facility: CLINIC | Age: 48
End: 2023-11-20
Payer: MEDICARE

## 2023-11-20 NOTE — TELEPHONE ENCOUNTER
Called and spoke with [facility] to see if patient is still receiving oxygen with their company. Stated yes patient is receiving oxygen with them. Asked about the company [employer]. Stated that they advertise on tv about helping supply patients with portable oxygen concentrators. Also stated that once patient is with [employer] for 3 months and uses 8 oxygen tanks, then she can be considered for a portable oxygen concentrator through them. Encouraged for patient to call them to talk about the oxygen concentrator.     Called and spoke with patient. Asked about [employer]. Patient stated that she saw the advertisement on tv and called them. Informed patient that I would need her to sign an DEBBIE in order for me to send her records to that company. Also informed patient of what Yvons[employer] stated and recommended. I encouraged her to reach to them to discuss the portable oxygen concentrator and to call the clinic back if assistance is needed. Patient verbalized understanding.

## 2023-11-20 NOTE — TELEPHONE ENCOUNTER
----- Message from Miriam Gomez sent at 11/17/2023  9:31 AM CST -----  Regarding: Office notes needed for oxygen renewal  Suraj Humphrey (hard to understand what they were saying) called and needs office notes within last 12 months to renew pt's oxygen. Fax  # [phone]          Thank You  Rochelle ECHEVARRIA

## 2024-01-24 ENCOUNTER — OFFICE VISIT (OUTPATIENT)
Dept: RHEUMATOLOGY | Facility: CLINIC | Age: 49
End: 2024-01-24
Payer: MEDICARE

## 2024-01-24 VITALS
SYSTOLIC BLOOD PRESSURE: 159 MMHG | DIASTOLIC BLOOD PRESSURE: 84 MMHG | OXYGEN SATURATION: 97 % | HEIGHT: 55 IN | TEMPERATURE: 98 F | BODY MASS INDEX: 36.61 KG/M2 | WEIGHT: 158.19 LBS | HEART RATE: 80 BPM

## 2024-01-24 DIAGNOSIS — D89.89 ANTISYNTHETASE SYNDROME: Primary | ICD-10-CM

## 2024-01-24 DIAGNOSIS — J84.9 ILD (INTERSTITIAL LUNG DISEASE): ICD-10-CM

## 2024-01-24 DIAGNOSIS — Z99.81 OXYGEN DEPENDENT: ICD-10-CM

## 2024-01-24 DIAGNOSIS — I27.20 PULMONARY HTN: ICD-10-CM

## 2024-01-24 DIAGNOSIS — Z79.899 DRUG-INDUCED IMMUNODEFICIENCY: ICD-10-CM

## 2024-01-24 DIAGNOSIS — Z23 NEED FOR VACCINATION: ICD-10-CM

## 2024-01-24 DIAGNOSIS — M05.79 SEROPOSITIVE RHEUMATOID ARTHRITIS OF MULTIPLE SITES: ICD-10-CM

## 2024-01-24 DIAGNOSIS — R79.89 ELEVATED LFTS: ICD-10-CM

## 2024-01-24 DIAGNOSIS — R76.8 POSITIVE ANA (ANTINUCLEAR ANTIBODY): ICD-10-CM

## 2024-01-24 DIAGNOSIS — D84.821 DRUG-INDUCED IMMUNODEFICIENCY: ICD-10-CM

## 2024-01-24 PROBLEM — Z87.59 HISTORY OF MISCARRIAGE: Status: ACTIVE | Noted: 2024-01-24

## 2024-01-24 PROCEDURE — 3008F BODY MASS INDEX DOCD: CPT | Mod: CPTII,,, | Performed by: INTERNAL MEDICINE

## 2024-01-24 PROCEDURE — 1159F MED LIST DOCD IN RCRD: CPT | Mod: CPTII,,, | Performed by: INTERNAL MEDICINE

## 2024-01-24 PROCEDURE — 99214 OFFICE O/P EST MOD 30 MIN: CPT | Mod: 25,PBBFAC | Performed by: INTERNAL MEDICINE

## 2024-01-24 PROCEDURE — 3077F SYST BP >= 140 MM HG: CPT | Mod: CPTII,,, | Performed by: INTERNAL MEDICINE

## 2024-01-24 PROCEDURE — 3079F DIAST BP 80-89 MM HG: CPT | Mod: CPTII,,, | Performed by: INTERNAL MEDICINE

## 2024-01-24 PROCEDURE — 99215 OFFICE O/P EST HI 40 MIN: CPT | Mod: S$PBB,,, | Performed by: INTERNAL MEDICINE

## 2024-01-24 PROCEDURE — 90750 HZV VACC RECOMBINANT IM: CPT | Mod: PBBFAC

## 2024-01-24 RX ORDER — NINTEDANIB 100 MG/1
1 CAPSULE ORAL 2 TIMES DAILY
Qty: 60 CAPSULE | Refills: 6 | Status: SHIPPED | OUTPATIENT
Start: 2024-01-24 | End: 2024-03-11 | Stop reason: SDUPTHER

## 2024-01-24 RX ORDER — TOCILIZUMAB 180 MG/ML
1 INJECTION, SOLUTION SUBCUTANEOUS
Qty: 4 EACH | Refills: 6 | Status: SHIPPED | OUTPATIENT
Start: 2024-01-24 | End: 2024-03-11 | Stop reason: SDUPTHER

## 2024-01-24 RX ORDER — HYDROXYCHLOROQUINE SULFATE 200 MG/1
200 TABLET, FILM COATED ORAL 2 TIMES DAILY
COMMUNITY
Start: 2023-12-07 | End: 2024-03-11 | Stop reason: ALTCHOICE

## 2024-01-24 RX ORDER — CONTAINER,EMPTY
EACH MISCELLANEOUS
COMMUNITY
Start: 2023-12-04

## 2024-01-24 RX ORDER — NINTEDANIB 100 MG/1
1 CAPSULE ORAL 2 TIMES DAILY
COMMUNITY
Start: 2024-01-17 | End: 2024-01-24 | Stop reason: SDUPTHER

## 2024-01-24 NOTE — PROGRESS NOTES
Patient ID: [id number]     Chief Complaint: Disease Management and Pain (Right knee pain )      HPI:     Ofelia Brady is a 48 y.o. female here today for follow up of anti synthetase syndrome.     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 She is on continuous O2 2L per NC and 4L on CPAP at night. She was treated with steroid in past. Patient is on Actemra and Ofev.    She take Actemra 162 mg SQ once a week and OFEV 100 mg BID.  Lowered Ofev dose due to elevated LFTs.  Admits SOB with exertion, stable. Not getting worse. No cough. Follows with pulmonary and cardiology here.   Admits h/o rash on face and neck, worse in sun.  No rashes today, rash on back and face has improved with the topical steroid cream.  Continues to have mild fatigue and tiredness.    Wrist pain on right and intermittent right knee pain. No red, warm and swollen joints. Morning stiffness for an hour.    Denies history of fevers, photosensitivity, oral or nasal ulcers, h/o MI, stroke, seizures, h/o PE or DVT, Raynaud's phenomenon, uveitis, malignancies.   Family history of autoimmune disease: none.  Pregnancies: 7  Miscarriages: 2, both in 4th month.  Smoking: nonsmoker.       Social History     Tobacco Use   Smoking Status Never   Smokeless Tobacco Never          ----------------------------  Antisynthetase syndrome  HTN (hypertension)  Interstitial lung disease  Obesity, unspecified  Rheumatoid arthritis, unspecified     Past Surgical History:   Procedure Laterality Date     SECTION      X5    RENAL BIOPSY         Review of patient's allergies indicates:  No Known Allergies    Outpatient Medications Marked as Taking for the 24 encounter (Office Visit) with Sarah Starr MD   Medication Sig Dispense Refill    albuterol (PROVENTIL) 2.5 mg /3 mL (0.083 %) nebulizer solution Take 3 mLs by nebulization every 8 (eight) hours as  needed.      albuterol (PROVENTIL/VENTOLIN HFA) 90 mcg/actuation inhaler Inhale 2 puffs into the lungs every 6 (six) hours.      albuterol-ipratropium (DUO-NEB) 2.5 mg-0.5 mg/3 mL nebulizer solution 3 mLs every 8 (eight) hours as needed.      ALCOHOL PREP PADS PadM SMARTSIG:Pledget(s) Topical As Directed      amLODIPine (NORVASC) 10 MG tablet Take 10 mg by mouth once daily.      aspirin (ECOTRIN) 81 MG EC tablet Take 1 tablet (81 mg total) by mouth once daily. After lunch (Patient taking differently: Take 81 mg by mouth once daily. After lunch, sometimes) 30 tablet 5    budesonide-formoterol 160-4.5 mcg (SYMBICORT) 160-4.5 mcg/actuation HFAA Inhale 1 puff into the lungs daily as needed.      diclofenac sodium (VOLTAREN) 1 % Gel Apply 1 Tube topically every 6 (six) hours as needed.      ergocalciferol (ERGOCALCIFEROL) 50,000 unit Cap Take 1 capsule by mouth every 7 days.      ezetimibe (ZETIA) 10 mg tablet Take 10 mg by mouth every evening.      famotidine (PEPCID) 20 MG tablet Take 20 mg by mouth every 12 (twelve) hours.      fluticasone propionate (FLOVENT DISKUS) 250 mcg/actuation DsDv Inhale 1 puff into the lungs once daily.      furosemide (LASIX) 20 MG tablet Take 1 tablet (20 mg total) by mouth daily as needed (for excessive swelling or shortness of breath). 90 tablet 2    gabapentin (NEURONTIN) 300 MG capsule Take 1 capsule (300 mg total) by mouth 2 (two) times a day. 60 capsule 5    hydroxychloroquine (PLAQUENIL) 200 mg tablet Take 200 mg by mouth 2 (two) times daily.      ibuprofen (ADVIL,MOTRIN) 800 MG tablet Take 800 mg by mouth 3 (three) times daily as needed.      loratadine (CLARITIN) 10 mg tablet Take 10 mg by mouth once daily at 6am.      metoprolol tartrate (LOPRESSOR) 100 MG tablet Take 100 mg by mouth 2 (two) times daily.      NIFEdipine (PROCARDIA-XL) 30 MG (OSM) 24 hr tablet Take 1 tablet (30 mg total) by mouth once daily. 30 tablet 11    ondansetron (ZOFRAN-ODT) 4 MG TbDL Take 4 mg by mouth  every 6 (six) hours as needed.      OXYGEN-AIR DELIVERY SYSTEMS MISC Inhale into the lungs.      SHARPSAFETY CONTAINER Misc USE AS DIRECTED WITH ACTEMRA      traMADoL (ULTRAM) 50 mg tablet Take 50 mg by mouth every 6 (six) hours as needed.      triamcinolone acetonide 0.1% (KENALOG) 0.1 % cream APPLY  CREAM EXTERNALLY TO AFFECTED AREA TWICE DAILY AS NEEDED FOR  RASH 60 g 6    [DISCONTINUED] ACTEMRA ACTPEN 162 mg/0.9 mL PnIj Inject 1 mL into the skin every 7 days. 14 each 1    [DISCONTINUED] OFEV 100 mg Cap Take 1 capsule by mouth 2 (two) times daily.         Social History     Socioeconomic History    Marital status: Single   Tobacco Use    Smoking status: Never    Smokeless tobacco: Never   Substance and Sexual Activity    Alcohol use: Never    Drug use: Never    Sexual activity: Yes        Family History   Problem Relation Age of Onset    Hypertension Mother     Diabetes Mother     Heart disease Mother     Hypertension Father     Diabetes Father     Heart disease Sister         Immunization History   Administered Date(s) Administered    Influenza - Quadrivalent - PF *Preferred* (6 months and older) 11/01/2022, 11/03/2023    Pneumococcal Conjugate - 13 Valent 02/04/2021    Pneumococcal Conjugate - 20 Valent 11/06/2023    Zoster Recombinant 01/24/2024       Patient Care Team:  Chaitanya Cosme NP as PCP - General (Family Medicine)  Evie Richter MD as Consulting Physician (Nephrology)     Subjective:     Constitutional:  Denies chills. Denies fever. Denies night sweats. Denies weight loss.   Ophthalmology: Denies blurred vision. Denies dry eyes. Denies eye pain. Denies Itching and redness.   ENT: Denies oral ulcers. Denies epistaxis. Denies dry mouth. Denies swollen glands.   Endocrine: Denies diabetes. Denies thyroid Problems.   Respiratory: Denies cough. Denies shortness of breath. Admits shortness of breath with exertion. Denies hemoptysis.   Cardiovascular: Denies chest pain at rest. Denies chest pain  "with exertion. Denies palpitations.    Gastrointestinal: Denies abdominal pain. Admits diarrhea, 4-5 times a day. Denies nausea. Denies vomiting. Denies hematemesis or hematochezia. Denies heartburn.  Genitourinary: Denies blood in urine.  Musculoskeletal: See HPI for details  Integumentary: per HPI  Peripheral Vascular: Denies Ulcers of hands and/or feet. Denies Cold extremities.   Neurologic: Denies dizziness. Denies headache.  Denies loss of strength. Denies numbness or tingling.   Psychiatric: Denies depression. Denies anxiety. Denies suicidal/homicidal ideations.      Objective:     BP (!) 159/84 (BP Location: Left arm, Patient Position: Sitting, BP Method: X-Large (Automatic))   Pulse 80   Temp 97.9 °F (36.6 °C) (Oral)   Ht 4' 7" (1.397 m)   Wt 71.8 kg (158 lb 3.2 oz)   SpO2 97%   BMI 36.77 kg/m²     Physical Exam    General Appearance: alert, pleasant, in no acute distress.  Skin: Skin color, texture, turgor normal. No rashes or lesions. H/o dry macular rash on face around nose and papular rash on back of neck. Rash improved.  Eyes:  extraocular movement intact (EOMI), pupils equal, round, reactive to light and accommodation, conjunctiva clear.  ENT: No oral or nasal ulcers.  Neck:  Neck supple. No adenopathy.   Lungs: CTA throughout without crackles, rhonchi, or wheezes.   Heart: RRR w/o murmurs.  No edema.   Abdomen: Soft, non-tender, no masses, rebound or guarding.  Neuro: Alert, oriented, CN II-XII GI, sensory and motor innervation intact.  Musculoskeletal: No active synovitis. Decrease ROM of bilateral wrists, no swelling.  Psych: Alert, oriented, normal eye contact.    Labs:   2020:  Anti CCP negative.  Anca, MPO and PR3 negative.  Rheumatoid factor IgA negative, IgG 22, low titer, IgM 13, low titer.   2/2021:  Beta 2 glycoprotein negative.  Lupus anticoagulant not detected.  Cardiolipin IgM 30, slightly elevated, IgG negative.  Negative RNA polymerase 3, dsDNA, RNP, SSB, negative Arango, Scl 70, " Tarsha 1.  C3, C4 okay.  TPMT gene mutation not detected.  Urine protein creatinine ratio 157.  Positive INES, 1:640, cytoplasmic pattern.  Positive SSA, 117, high titer.  Negative fibrillin, PM/Scl, Tarsha 1.  Myositis panel showed positive PL 12 antibody.  4/2021:  Elevated IgA 380, normal between 87 to 352.  Elevated IgM, 264, normal between 26 to 217.  Normal IgG level.  CPK elevated 456.  7/2022: ANCA negative. RF IgA and anti CCP negative. C3, C4 ok. dsDNA negative. INES 1:160, cytoplasmic pattern.  WBC count 16.4.  Hemoglobin 11.1.  Creatinine 1.07.  CMP okay. No protein and blood in urine.   07/19/2023: WBC count 18.9, higher than before. Neutrophil count 10.13. Potassium 3.1. ALT elevated 76. CRP 44. ESR 55. Negative hepatitis-B, hepatitis-C, HIV. Phospholipid/cardiolipin IgM 20, weak positive, IgG negative. Beta 2 glycoprotein negative. Lupus anticoagulant not detected. QuantiFERON TB negative. CPK normal. 1+ protein and no blood in urine. Upc 200 milligram/gram. C3, C4 ok. Aldolase elevated 18.7.   8/14/23: AST elevated 72, ALT elevated 158. CRP 12.9. ESR 52. CPK normal. Aldolase 8.1, elevated. Kappa lambda light chain ratio normal. GGT elevated 117. SPEP and IFA okay, M spike not observed. No monoclonal bands detected. IgG level elevated 1853, normal between 500-1631. IgA elevated 642, normal between . IgM normal.  08/31/2023; GGT 89, improved.  CBC okay.  WBC count improved.  Platelets 421.  , .   9/19/23:  CMP okay, LFTs normalized.  WBC count elevated 13.03, hemoglobin 11.7.  11/6/23:  Upc okay.  Trace protein and no blood in urine.  CMP okay.  LFTs normal.  ESR 60.  CRP normal.  CBC okay.  C3, C4 normal.  CPK normal.  Aldolase normal.    Imaging:     TTE on 4/27/2021 showed an EF of 60%, borderline concentric LVH. She had mild TR with RVSP of 57mmHg.   07/20/2022 echocardiogram showed EF normal, 60%.  Systolic function normal.  Low normal right ventricular systolic function.  PASP 62 mmHg.   Moderate pulmonary hypertension.  Moderate pulmonary regurgitation.  Echocardiogram 08/14/2023 showed EF 57%, systolic function normal of both right and left ventricle. PASP 26 mmHg.    Right knee 7/2022: There are degenerative osteoarthritic narrowing of the medial and the lateral compartment of the right knee joint spaces.  Medial compartment joint space narrowing is more pronounced and there is subchondral sclerosis and osteophytes.  Articular surfaces alignment is preserved.  No acute fracture or dislocation. Right knee degenerative osteoarthritic changes, unchanged.    4/2021:  CT chest showed worsened appearance of lungs compared to prior in August 2020 with extensive ground-glass and reticular opacities in both lungs.  No convincing honeycombing.  Mild bronchiectasis favoring the lung bases.  10/19/22:  CT scan of chest showed diffuse bilateral ground-glass interstitial infiltrates associated with interstitial fibrosis in periphery of lungs bilaterally.  Some cystic changes seen in periphery of lungs bilaterally.  No significant change of ILD since April 2021.  Some bronchiectasis seen in upper lobes bilaterally.  No pleural effusion, mediastinum unremarkable, no lymphadenopathy.  CT chest 08/19/2023 showed consolidation seen in April 20, 2021 and October 2022 has resolved. Extensive septal thickening and bronchiectasis with sparing of lung apices. Faint bibasilar predominant ground-glass opacities to extensive septal thickening. There is calcified plaque in LAD    7/2022:  Kidney biopsy showed limited sample immunofluorescence negative for immune complex mediated process.  Acute tubular injury.  Mild interstitial fibrosis and atrophy.    Assessment:       ICD-10-CM ICD-9-CM   1. Antisynthetase syndrome  D89.89 279.49   2. Seropositive rheumatoid arthritis of multiple sites  M05.79 714.0   3. Elevated LFTs  R79.89 790.6   4. ILD (interstitial lung disease)  J84.9 515   5. Pulmonary HTN  I27.20 416.8   6.  Oxygen dependent  Z99.81 V46.2   7. Drug-induced immunodeficiency  D84.821 279.3    Z79.899 E947.9   8. Positive INES (antinuclear antibody)  R76.8 795.79   9. Need for vaccination  Z23 V05.9              Plan:       1. 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 She continues to be on home oxygen at 2 L.   - Positive INES associated with myositis. All INOCENTE's negative except for SSA. SSA related to lung disease. Will check complements, UA and UPC periodically.   - She was OFEV 150 mg BID, dose decreased to 100 mg b.i.d. because of elevated LFTs.    - Shortness of breath is stable per patient.    - Continue with Actemra 162 mg SQ once a week.    CT chest showed improvement in 8/2023 and echo showed improvement of RVSP in 8/2023. She was not able to do PFT's in 8/2022.   - Never tired cellcept in the past.   - restarted Plaquenil 200 mg b.i.d..  Placed Ophthalmology referral for eye exam on Plaquenil, scheduled to see them in Feb 2024. No showed to her visit with optho in 11/2023.   - Repeat CT chest and if she continues to have ground glass opacities, will add CellCept. Discussed risks and benefits of medication with patient.   -  Labs today.     2.+ RF rheumatoid arthritis. H/o inflammatory pattern of joint pain. Continue Actemra.     3. 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 On Ofev and Actmera. Reports significant symptomatic improvement and increase ability to do ADLs after initiation of Ofev. On 2LNC, stable, no progression in lung disease in CT scan of the chest done in October 2022. Unable to perform PFT. CT chest showed improvement in 8/2023 and echo showed improvement of RVSP in 8/2023. Continue follow-up with Pulmonary. She was referred for lung transplant eval, initially canceled several years ago due to obesity, recently had to be rescheduled due to transportation difficulties, has not seen them yet, advised to follow up with  pulmonary.  - Repeat CT chest.     4. Pulmonary hypertension. 07/20/2022 echocardiogram showed EF normal, 60%.  Low normal right ventricular systolic function.  PASP 62 mmHg.  Moderate pulmonary hypertension.  Moderate pulmonary regurgitation.  She [facility]iology at [facility].    Repeat echocardiogram in August 2023 showed improvement of PASP, 26 mmHg. No need to see pulmonary hypertension specialist.    5. Hx of two miscarriages. Both at 4 months gestation.  2/2021:  Beta 2 glycoprotein negative.  Lupus anticoagulant not detected.  Cardiolipin IgM 30, slightly elevated, IgG negative.  7/2023:Phospholipid/cardiolipin IgM 20, weak positive, IgG negative. Beta 2 glycoprotein negative. Lupus anticoagulant not detected. Takes ASA daily.    6. Rash: H/o macular rash on face around nose and papular rash on back of neck. Photoprotection discussed.  C/w topical triamcinolone cream as needed. No rash now.    7. HTN. Well-controlled, follow-up with PCP.      8. High Risk Med Use. Persons with rheumatoid arthritis, lupus, psoriatic arthritis and other autoimmune diseases are at increased risk of cardiovascular disease including heart attack and stroke. We recommend that all patients with these conditions have annual health maintenance exams including lipid measurements, blood pressure measurements, and smoking cessation counseling when applicable at their primary care provider's office.   -Advised to stay up-to-date on age appropriate vaccinations and malignancy screening, including yearly skin exams.    - PCV 13 in February 2021. Pneumovax 20 11/6/23. Zoster 1/24/24.      Follow up in about 2 months (around 3/24/2024) for with NP and 4 months with me. In addition to their scheduled follow up, the patient has also been instructed to follow up on as needed basis.      Total time spent with patient and documentation is 40 minutes. All questions were answered to patient's satisfaction and patient verbalized understanding.

## 2024-02-14 ENCOUNTER — OFFICE VISIT (OUTPATIENT)
Dept: OPHTHALMOLOGY | Facility: CLINIC | Age: 49
End: 2024-02-14
Payer: MEDICARE

## 2024-02-14 VITALS — HEIGHT: 55 IN | BODY MASS INDEX: 36.74 KG/M2 | WEIGHT: 158.75 LBS

## 2024-02-14 DIAGNOSIS — H04.129 DRY EYE: ICD-10-CM

## 2024-02-14 DIAGNOSIS — R76.8 POSITIVE ANA (ANTINUCLEAR ANTIBODY): Primary | ICD-10-CM

## 2024-02-14 DIAGNOSIS — Z79.899 LONG-TERM USE OF PLAQUENIL: ICD-10-CM

## 2024-02-14 DIAGNOSIS — H25.13 AGE-RELATED NUCLEAR CATARACT OF BOTH EYES: ICD-10-CM

## 2024-02-14 PROCEDURE — 92083 EXTENDED VISUAL FIELD XM: CPT | Mod: PBBFAC,PN | Performed by: OPHTHALMOLOGY

## 2024-02-14 PROCEDURE — 92134 CPTRZ OPH DX IMG PST SGM RTA: CPT | Mod: PBBFAC,PN | Performed by: STUDENT IN AN ORGANIZED HEALTH CARE EDUCATION/TRAINING PROGRAM

## 2024-02-14 PROCEDURE — 92134 CPTRZ OPH DX IMG PST SGM RTA: CPT | Mod: PBBFAC,PN | Performed by: OPHTHALMOLOGY

## 2024-02-14 PROCEDURE — 99212 OFFICE O/P EST SF 10 MIN: CPT | Mod: PBBFAC,PN | Performed by: STUDENT IN AN ORGANIZED HEALTH CARE EDUCATION/TRAINING PROGRAM

## 2024-02-14 RX ORDER — PHENYLEPH/TROPICAMIDE IN WATER 2.5 %-1 %
1 DROPS OPHTHALMIC (EYE) ONCE
Status: COMPLETED | OUTPATIENT
Start: 2024-02-14 | End: 2024-02-14

## 2024-02-14 RX ADMIN — Medication 1 DROP: at 02:02

## 2024-02-14 NOTE — PROGRESS NOTES
HPI    New eval Plaquenil   Last edited by Lucinda Bee, RN on 2/14/2024  1:49 PM.            Assessment /Plan     For exam results, see Encounter Report.    Positive INES (antinuclear antibody)  -     tropicamide /PHENYLephrine opthalmic solution 1 drop    Long-term use of Plaquenil    Dry eye    Age-related nuclear cataract of both eyes                   1. long term plaquenil usage  - started 2023 for antisynthetase syndrome   - vf 10-2 2/14/24: [facility] misses// full   - oct 5 line 2/14/24: good contour is/os intact ou, no srf/irf ou   - dfe wnl 2/14/24  - pt on over 5mg/kg, messaged rheumatolgy  - 1 year repeat testing     2. NSC  - nvs  - mrx 2/14/24 20/20 ou     3. Humza/mgd  - ats, wcs, hand out given     1 year vf 10-2, 5 line raster and dfe ou

## 2024-02-19 ENCOUNTER — HOSPITAL ENCOUNTER (OUTPATIENT)
Dept: RADIOLOGY | Facility: HOSPITAL | Age: 49
Discharge: HOME | End: 2024-02-19
Attending: INTERNAL MEDICINE
Payer: MEDICARE

## 2024-02-19 DIAGNOSIS — M05.79 SEROPOSITIVE RHEUMATOID ARTHRITIS OF MULTIPLE SITES: ICD-10-CM

## 2024-02-19 DIAGNOSIS — J84.9 ILD (INTERSTITIAL LUNG DISEASE): ICD-10-CM

## 2024-02-19 DIAGNOSIS — D89.89 ANTISYNTHETASE SYNDROME: ICD-10-CM

## 2024-02-19 PROCEDURE — 71250 CT THORAX DX C-: CPT | Mod: TC

## 2024-03-11 ENCOUNTER — OFFICE VISIT (OUTPATIENT)
Dept: RHEUMATOLOGY | Facility: CLINIC | Age: 49
End: 2024-03-11
Payer: MEDICARE

## 2024-03-11 VITALS
TEMPERATURE: 98 F | WEIGHT: 160.63 LBS | HEIGHT: 55 IN | OXYGEN SATURATION: 94 % | HEART RATE: 82 BPM | DIASTOLIC BLOOD PRESSURE: 88 MMHG | BODY MASS INDEX: 37.17 KG/M2 | RESPIRATION RATE: 18 BRPM | SYSTOLIC BLOOD PRESSURE: 168 MMHG

## 2024-03-11 DIAGNOSIS — Z79.899 DRUG-INDUCED IMMUNODEFICIENCY: ICD-10-CM

## 2024-03-11 DIAGNOSIS — D89.89 ANTISYNTHETASE SYNDROME: ICD-10-CM

## 2024-03-11 DIAGNOSIS — Z99.81 OXYGEN DEPENDENT: ICD-10-CM

## 2024-03-11 DIAGNOSIS — M05.79 SEROPOSITIVE RHEUMATOID ARTHRITIS OF MULTIPLE SITES: ICD-10-CM

## 2024-03-11 DIAGNOSIS — D84.821 DRUG-INDUCED IMMUNODEFICIENCY: ICD-10-CM

## 2024-03-11 DIAGNOSIS — I27.20 PULMONARY HTN: ICD-10-CM

## 2024-03-11 DIAGNOSIS — R79.89 ELEVATED LFTS: ICD-10-CM

## 2024-03-11 DIAGNOSIS — J84.9 ILD (INTERSTITIAL LUNG DISEASE): ICD-10-CM

## 2024-03-11 DIAGNOSIS — Z23 NEED FOR SHINGLES VACCINE: Primary | ICD-10-CM

## 2024-03-11 DIAGNOSIS — R76.8 POSITIVE ANA (ANTINUCLEAR ANTIBODY): ICD-10-CM

## 2024-03-11 PROCEDURE — 99215 OFFICE O/P EST HI 40 MIN: CPT | Mod: S$PBB,,, | Performed by: INTERNAL MEDICINE

## 2024-03-11 PROCEDURE — 90750 HZV VACC RECOMBINANT IM: CPT | Mod: PBBFAC

## 2024-03-11 PROCEDURE — 3079F DIAST BP 80-89 MM HG: CPT | Mod: CPTII,,, | Performed by: INTERNAL MEDICINE

## 2024-03-11 PROCEDURE — 3077F SYST BP >= 140 MM HG: CPT | Mod: CPTII,,, | Performed by: INTERNAL MEDICINE

## 2024-03-11 PROCEDURE — 3008F BODY MASS INDEX DOCD: CPT | Mod: CPTII,,, | Performed by: INTERNAL MEDICINE

## 2024-03-11 PROCEDURE — 1159F MED LIST DOCD IN RCRD: CPT | Mod: CPTII,,, | Performed by: INTERNAL MEDICINE

## 2024-03-11 PROCEDURE — 99215 OFFICE O/P EST HI 40 MIN: CPT | Mod: PBBFAC | Performed by: INTERNAL MEDICINE

## 2024-03-11 RX ORDER — TOCILIZUMAB 180 MG/ML
1 INJECTION, SOLUTION SUBCUTANEOUS
Qty: 4 EACH | Refills: 6 | Status: SHIPPED | OUTPATIENT
Start: 2024-03-11 | End: 2024-05-01 | Stop reason: SDUPTHER

## 2024-03-11 RX ORDER — NINTEDANIB 100 MG/1
1 CAPSULE ORAL 2 TIMES DAILY
Qty: 60 CAPSULE | Refills: 6 | Status: SHIPPED | OUTPATIENT
Start: 2024-03-11 | End: 2024-05-01 | Stop reason: SDUPTHER

## 2024-03-11 RX ORDER — MYCOPHENOLATE MOFETIL 500 MG/1
TABLET ORAL
Qty: 120 TABLET | Refills: 3 | Status: SHIPPED | OUTPATIENT
Start: 2024-03-11 | End: 2024-05-01 | Stop reason: SDUPTHER

## 2024-03-11 NOTE — PROGRESS NOTES
Patient ID: [id number]     Chief Complaint: Antisynthetase syndrome (Patient states her SOB has not gotten worse. States her rash have improved on her face she still does have dryness. She is complaining of knee pain today;. )      HPI:     Ofelia Brady is a 48 y.o. female here today for follow up of anti synthetase syndrome.     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 She is on continuous O2 2L per NC and 4L on CPAP at night. She was treated with steroid in past. Patient is on Actemra and Ofev.    She take Actemra 162 mg SQ once a week and OFEV 100 mg BID.  Lowered Ofev dose due to elevated LFTs.  Admits SOB with exertion, stable. Not getting worse. No cough. Follows with pulmonary and cardiology here.   Admits h/o rash on face and neck, worse in sun.  No rashes today, rash on back and face has improved with the topical steroid cream.  Continues to have mild fatigue and tiredness.    Wrist pain on right and intermittent right knee pain. No red, warm and swollen joints. Morning stiffness for an hour.    Denies history of fevers, photosensitivity, oral or nasal ulcers, h/o MI, stroke, seizures, h/o PE or DVT, Raynaud's phenomenon, uveitis, malignancies.   Family history of autoimmune disease: none.  Pregnancies: 7  Miscarriages: 2, both in 4th month.  Smoking: nonsmoker.       Social History     Tobacco Use   Smoking Status Never   Smokeless Tobacco Never          ----------------------------  Antisynthetase syndrome  HTN (hypertension)  Interstitial lung disease  Obesity, unspecified  Rheumatoid arthritis, unspecified     Past Surgical History:   Procedure Laterality Date     SECTION      X5    RENAL BIOPSY         Review of patient's allergies indicates:  No Known Allergies    Outpatient Medications Marked as Taking for the 3/11/24 encounter (Office Visit) with Sarah Starr MD   Medication Sig  Dispense Refill    albuterol (PROVENTIL) 2.5 mg /3 mL (0.083 %) nebulizer solution Take 3 mLs by nebulization every 8 (eight) hours as needed.      albuterol (PROVENTIL/VENTOLIN HFA) 90 mcg/actuation inhaler Inhale 2 puffs into the lungs every 6 (six) hours.      albuterol-ipratropium (DUO-NEB) 2.5 mg-0.5 mg/3 mL nebulizer solution 3 mLs every 8 (eight) hours as needed.      ALCOHOL PREP PADS PadM SMARTSIG:Pledget(s) Topical As Directed      amLODIPine (NORVASC) 10 MG tablet Take 10 mg by mouth once daily.      aspirin (ECOTRIN) 81 MG EC tablet Take 1 tablet (81 mg total) by mouth once daily. After lunch (Patient taking differently: Take 81 mg by mouth once daily. After lunch, sometimes) 30 tablet 5    budesonide-formoterol 160-4.5 mcg (SYMBICORT) 160-4.5 mcg/actuation HFAA Inhale 1 puff into the lungs daily as needed.      cimetidine (TAGAMET) 800 MG tablet Take 800 mg by mouth every evening.      diclofenac sodium (VOLTAREN) 1 % Gel Apply 1 Tube topically every 6 (six) hours as needed.      ergocalciferol (ERGOCALCIFEROL) 50,000 unit Cap Take 1 capsule by mouth every 7 days.      ezetimibe (ZETIA) 10 mg tablet Take 10 mg by mouth every evening.      famotidine (PEPCID) 20 MG tablet Take 20 mg by mouth every 12 (twelve) hours.      fluticasone propionate (FLOVENT DISKUS) 250 mcg/actuation DsDv Inhale 1 puff into the lungs once daily.      furosemide (LASIX) 20 MG tablet Take 1 tablet (20 mg total) by mouth daily as needed (for excessive swelling or shortness of breath). 90 tablet 2    gabapentin (NEURONTIN) 300 MG capsule Take 1 capsule (300 mg total) by mouth 2 (two) times a day. 60 capsule 5    ibuprofen (ADVIL,MOTRIN) 800 MG tablet Take 800 mg by mouth 3 (three) times daily as needed.      loratadine (CLARITIN) 10 mg tablet Take 10 mg by mouth once daily at 6am.      metoprolol tartrate (LOPRESSOR) 100 MG tablet Take 100 mg by mouth 2 (two) times daily.      NIFEdipine (PROCARDIA-XL) 30 MG  (OSM) 24 hr tablet Take 1 tablet (30 mg total) by mouth once daily. 30 tablet 11    omeprazole (PRILOSEC) 20 MG capsule Take 20 mg by mouth once daily.      ondansetron (ZOFRAN-ODT) 4 MG TbDL Take 4 mg by mouth every 6 (six) hours as needed.      OXYGEN-AIR DELIVERY SYSTEMS MISC Inhale into the lungs.      pantoprazole (PROTONIX) 40 MG tablet Take 40 mg by mouth every morning.      SHARPSAFETY CONTAINER Misc USE AS DIRECTED WITH ACTEMRA      traMADoL (ULTRAM) 50 mg tablet Take 50 mg by mouth every 6 (six) hours as needed.      triamcinolone acetonide 0.1% (KENALOG) 0.1 % cream APPLY  CREAM EXTERNALLY TO AFFECTED AREA TWICE DAILY AS NEEDED FOR  RASH 60 g 6    [DISCONTINUED] ACTEMRA ACTPEN 162 mg/0.9 mL PnIj Inject 1 mL into the skin every 7 days. 4 each 6    [DISCONTINUED] hydroxychloroquine (PLAQUENIL) 200 mg tablet Take 200 mg by mouth 2 (two) times daily.      [DISCONTINUED] OFEV 100 mg Cap Take 1 capsule by mouth 2 (two) times daily. 60 capsule 6       Social History     Socioeconomic History    Marital status: Single   Tobacco Use    Smoking status: Never    Smokeless tobacco: Never   Substance and Sexual Activity    Alcohol use: Never    Drug use: Never    Sexual activity: Yes        Family History   Problem Relation Age of Onset    Hypertension Mother     Diabetes Mother     Heart disease Mother     Hypertension Father     Diabetes Father     Heart disease Sister         Immunization History   Administered Date(s) Administered    Influenza - Quadrivalent - PF *Preferred* (6 months and older) 11/01/2022, 11/03/2023    Pneumococcal Conjugate - 13 Valent 02/04/2021    Pneumococcal Conjugate - 20 Valent 11/06/2023    Zoster Recombinant 01/24/2024, 03/11/2024       Patient Care Team:  Chaitanya Cosme NP as PCP - General (Family Medicine)  Evie Richter MD as Consulting Physician (Nephrology)     Subjective:     Constitutional:  Denies chills. Denies fever. Denies night sweats.  "Denies weight loss.   Ophthalmology: Denies blurred vision. Denies dry eyes. Denies eye pain. Denies Itching and redness.   ENT: Denies oral ulcers. Denies epistaxis. Denies dry mouth. Denies swollen glands.   Endocrine: Denies diabetes. Denies thyroid Problems.   Respiratory: Denies cough. Denies shortness of breath. Admits shortness of breath with exertion. Denies hemoptysis.   Cardiovascular: Denies chest pain at rest. Denies chest pain with exertion. Denies palpitations.    Gastrointestinal: Denies abdominal pain. Admits diarrhea, 4-5 times a day. Denies nausea. Denies vomiting. Denies hematemesis or hematochezia. Denies heartburn.  Genitourinary: Denies blood in urine.  Musculoskeletal: See HPI for details  Integumentary: per HPI  Peripheral Vascular: Denies Ulcers of hands and/or feet. Denies Cold extremities.   Neurologic: Denies dizziness. Denies headache.  Denies loss of strength. Denies numbness or tingling.   Psychiatric: Denies depression. Denies anxiety. Denies suicidal/homicidal ideations.      Objective:     BP (!) 168/88 (BP Location: Left arm, Patient Position: Sitting, BP Method: Large (Automatic))   Pulse 82   Temp 98.3 °F (36.8 °C) (Oral)   Resp 18   Ht 4' 7" (1.397 m)   Wt 72.8 kg (160 lb 9.6 oz)   SpO2 (!) 94%   BMI 37.33 kg/m²     Physical Exam    General Appearance: alert, pleasant, in no acute distress.  Skin: Skin color, texture, turgor normal. No rashes or lesions. H/o dry macular rash on face around nose and papular rash on back of neck. Rash improved.  Eyes:  extraocular movement intact (EOMI), pupils equal, round, reactive to light and accommodation, conjunctiva clear.  ENT: No oral or nasal ulcers.  Neck:  Neck supple. No adenopathy.   Lungs: CTA throughout without crackles, rhonchi, or wheezes.   Heart: RRR w/o murmurs.  No edema.   Abdomen: Soft, non-tender, no masses, rebound or guarding.  Neuro: Alert, oriented, CN II-XII GI, sensory and motor innervation " intact.  Musculoskeletal: No active synovitis. Decrease ROM of bilateral wrists, no swelling.  Psych: Alert, oriented, normal eye contact.    Labs:   2020:  Anti CCP negative.  Anca, MPO and PR3 negative.  Rheumatoid factor IgA negative, IgG 22, low titer, IgM 13, low titer.   2/2021:  Beta 2 glycoprotein negative.  Lupus anticoagulant not detected.  Cardiolipin IgM 30, slightly elevated, IgG negative.  Negative RNA polymerase 3, dsDNA, RNP, SSB, negative Smith, Scl 70, Tarsha 1.  C3, C4 okay.  TPMT gene mutation not detected.  Urine protein creatinine ratio 157.  Positive INES, 1:640, cytoplasmic pattern.  Positive SSA, 117, high titer.  Negative fibrillin, PM/Scl, Tarsha 1.  Myositis panel showed positive PL 12 antibody.  4/2021:  Elevated IgA 380, normal between 87 to 352.  Elevated IgM, 264, normal between 26 to 217.  Normal IgG level.  CPK elevated 456.  7/2022: ANCA negative. RF IgA and anti CCP negative. C3, C4 ok. dsDNA negative. INES 1:160, cytoplasmic pattern.  WBC count 16.4.  Hemoglobin 11.1.  Creatinine 1.07.  CMP okay. No protein and blood in urine.   07/19/2023: WBC count 18.9, higher than before. Neutrophil count 10.13. Potassium 3.1. ALT elevated 76. CRP 44. ESR 55. Negative hepatitis-B, hepatitis-C, HIV. Phospholipid/cardiolipin IgM 20, weak positive, IgG negative. Beta 2 glycoprotein negative. Lupus anticoagulant not detected. QuantiFERON TB negative. CPK normal. 1+ protein and no blood in urine. Upc 200 milligram/gram. C3, C4 ok. Aldolase elevated 18.7.   8/14/23: AST elevated 72, ALT elevated 158. CRP 12.9. ESR 52. CPK normal. Aldolase 8.1, elevated. Kappa lambda light chain ratio normal. GGT elevated 117. SPEP and IFA okay, M spike not observed. No monoclonal bands detected. IgG level elevated 1853, normal between 500-1631. IgA elevated 642, normal between . IgM normal.  08/31/2023; GGT 89, improved.  CBC okay.  WBC count improved.  Platelets 421.  , .   9/19/23:  CMP okay, LFTs  normalized.  WBC count elevated 13.03, hemoglobin 11.7.  11/6/23:  Upc okay.  Trace protein and no blood in urine.  CMP okay.  LFTs normal.  ESR 60.  CRP normal.  CBC okay.  C3, C4 normal.  CPK normal.  Aldolase normal.  1/24/2024; WBC count 12.13, otherwise CBC okay.  Elevated absolute lymphocyte count 6.9.  Normal lymphocyte count between 0.6-4.6.  CRP 17.5.  Potassium 3.3, CMP okay otherwise.  ESR 47.  CPK normal.  Aldolase 8.3.      Imaging:     TTE on 4/27/2021 showed an EF of 60%, borderline concentric LVH. She had mild TR with RVSP of 57mmHg.   07/20/2022 echocardiogram showed EF normal, 60%.  Systolic function normal.  Low normal right ventricular systolic function.  PASP 62 mmHg.  Moderate pulmonary hypertension.  Moderate pulmonary regurgitation.  Echocardiogram 08/14/2023 showed EF 57%, systolic function normal of both right and left ventricle. PASP 26 mmHg.    Right knee 7/2022: There are degenerative osteoarthritic narrowing of the medial and the lateral compartment of the right knee joint spaces.  Medial compartment joint space narrowing is more pronounced and there is subchondral sclerosis and osteophytes.  Articular surfaces alignment is preserved.  No acute fracture or dislocation. Right knee degenerative osteoarthritic changes, unchanged.    4/2021:  CT chest showed worsened appearance of lungs compared to prior in August 2020 with extensive ground-glass and reticular opacities in both lungs.  No convincing honeycombing.  Mild bronchiectasis favoring the lung bases.  10/19/22:  CT scan of chest showed diffuse bilateral ground-glass interstitial infiltrates associated with interstitial fibrosis in periphery of lungs bilaterally.  Some cystic changes seen in periphery of lungs bilaterally.  No significant change of ILD since April 2021.  Some bronchiectasis seen in upper lobes bilaterally.  No pleural effusion, mediastinum unremarkable, no lymphadenopathy.  CT chest 08/19/2023 showed consolidation  seen in April 20, 2021 and October 2022 has resolved. Extensive septal thickening and bronchiectasis with sparing of lung apices. Faint bibasilar predominant ground-glass opacities to extensive septal thickening. There is calcified plaque in LAD    CT chest 02/22/2024; areas of bilateral fibrosis, ground-glass and mild bronchiectasis with overall similar appearance since August 2023.  Stable right upper lobe nodule.  Few borderline enlarged mediastinal lymph nodes.    7/2022:  Kidney biopsy showed limited sample immunofluorescence negative for immune complex mediated process.  Acute tubular injury.  Mild interstitial fibrosis and atrophy.    Assessment:       ICD-10-CM ICD-9-CM   1. Need for shingles vaccine  Z23 V04.89   2. Antisynthetase syndrome  D89.89 279.49   3. Seropositive rheumatoid arthritis of multiple sites  M05.79 714.0   4. Positive INES (antinuclear antibody)  R76.8 795.79   5. Elevated LFTs  R79.89 790.6   6. ILD (interstitial lung disease)  J84.9 515   7. Pulmonary HTN  I27.20 416.8   8. Oxygen dependent  Z99.81 V46.2   9. Drug-induced immunodeficiency  D84.821 279.3    Z79.899 E947.9                Plan:       1. 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 She continues to be on home oxygen at 2 L.   - Positive INES associated with myositis. All INOCENTE's negative except for SSA. SSA related to lung disease and myositis. Will check complements, UA and UPC periodically.   - She was OFEV 150 mg BID, dose decreased to 100 mg b.i.d. because of elevated LFTs.    - Shortness of breath is stable per patient.    - Continue with Actemra 162 mg SQ once a week.    CT chest showed improvement in 8/2023 and echo showed improvement of RVSP in 8/2023. She was not able to do PFT's in 8/2022.   - CT chest 02/22/2024; areas of bilateral fibrosis, ground-glass and mild bronchiectasis with overall similar appearance since August 2023.  Stable right  upper lobe nodule.  Few borderline enlarged mediastinal lymph nodes  - repeat chest CT continues to show ground-glass opacities, will start her on CellCept.  - discussed the risks and benefits of medication with the patient.  Start CellCept 500 mg b.i.d. and after 1 week increase dose to 1000 mg b.i.d..    - stop Plaquenil.    - labs in 4 weeks.    2.+ RF rheumatoid arthritis. H/o inflammatory pattern of joint pain. Continue Actemra.     3. 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 On Ofev and Actmera. Reports significant symptomatic improvement and increase ability to do ADLs after initiation of Ofev. On 2LNC, stable, no progression in lung disease in CT scan of the chest done in October 2022. Unable to perform PFT. CT chest showed improvement in 8/2023 and echo showed improvement of RVSP in 8/2023. Continue follow-up with Pulmonary. She was referred for lung transplant eval, initially canceled several years ago due to obesity, recently had to be rescheduled due to transportation difficulties, has not seen them yet, advised to follow up with pulmonary.  - repeat CT chest in February 2024 continued to show bilateral fibrosis, ground-glass opacities, adding CellCept to Actemra.    4. Pulmonary hypertension. 07/20/2022 echocardiogram showed EF normal, 60%.  Low normal right ventricular systolic function.  PASP 62 mmHg.  Moderate pulmonary hypertension.  Moderate pulmonary regurgitation.  She follows with Cardiology at [facility].    Repeat echocardiogram in August 2023 showed improvement of PASP, 26 mmHg. No need to see pulmonary hypertension specialist for now.    5. Hx of two miscarriages. Both at 4 months gestation.  2/2021:  Beta 2 glycoprotein negative.  Lupus anticoagulant not detected.  Cardiolipin IgM 30, slightly elevated, IgG negative.  7/2023:Phospholipid/cardiolipin IgM 20, weak positive, IgG negative. Beta 2 glycoprotein negative. Lupus anticoagulant not detected. Takes ASA daily.    6. Rash: H/o macular rash on  face around nose and papular rash on back of neck. Photoprotection discussed.  C/w topical triamcinolone cream as needed. No rash now.    7. HTN. Well-controlled, follow-up with PCP.      8. High Risk Med Use. Persons with rheumatoid arthritis, lupus, psoriatic arthritis and other autoimmune diseases are at increased risk of cardiovascular disease including heart attack and stroke. We recommend that all patients with these conditions have annual health maintenance exams including lipid measurements, blood pressure measurements, and smoking cessation counseling when applicable at their primary care provider's office.   -Advised to stay up-to-date on age appropriate vaccinations and malignancy screening, including yearly skin exams.    - PCV 13 in February 2021. Pneumovax 20 11/6/23. Zoster 1/24/24, 3/11/24 .      Follow up in about 3 months (around 6/19/2024) for at 2:30. In addition to their scheduled follow up, the patient has also been instructed to follow up on as needed basis.      Total time spent with patient and documentation is 40 minutes. All questions were answered to patient's satisfaction and patient verbalized understanding.

## 2024-03-19 ENCOUNTER — TELEPHONE (OUTPATIENT)
Dept: RHEUMATOLOGY | Facility: CLINIC | Age: 49
End: 2024-03-19
Payer: MEDICARE

## 2024-03-19 NOTE — TELEPHONE ENCOUNTER
Spoke with Pennie from [facility] specialty pharmacy for verbal order given for clarification to change instructions from 1ml to 0.9mL Q7 days for the Actemra Pen.

## 2024-03-26 ENCOUNTER — OFFICE VISIT (OUTPATIENT)
Dept: PULMONOLOGY | Facility: CLINIC | Age: 49
End: 2024-03-26
Payer: MEDICARE

## 2024-03-26 VITALS
HEIGHT: 55 IN | SYSTOLIC BLOOD PRESSURE: 168 MMHG | WEIGHT: 167.56 LBS | HEART RATE: 98 BPM | BODY MASS INDEX: 38.78 KG/M2 | DIASTOLIC BLOOD PRESSURE: 119 MMHG | OXYGEN SATURATION: 94 %

## 2024-03-26 DIAGNOSIS — J84.10 PULMONARY FIBROSIS: Primary | ICD-10-CM

## 2024-03-26 PROCEDURE — 1159F MED LIST DOCD IN RCRD: CPT | Mod: CPTII,,, | Performed by: INTERNAL MEDICINE

## 2024-03-26 PROCEDURE — 99214 OFFICE O/P EST MOD 30 MIN: CPT | Mod: S$PBB,GC,, | Performed by: INTERNAL MEDICINE

## 2024-03-26 PROCEDURE — 3077F SYST BP >= 140 MM HG: CPT | Mod: CPTII,,, | Performed by: INTERNAL MEDICINE

## 2024-03-26 PROCEDURE — 3080F DIAST BP >= 90 MM HG: CPT | Mod: CPTII,,, | Performed by: INTERNAL MEDICINE

## 2024-03-26 PROCEDURE — 99213 OFFICE O/P EST LOW 20 MIN: CPT | Mod: PBBFAC

## 2024-03-26 PROCEDURE — 3008F BODY MASS INDEX DOCD: CPT | Mod: CPTII,,, | Performed by: INTERNAL MEDICINE

## 2024-03-26 NOTE — PROGRESS NOTES
"[facility] PULMONARY MEDICINE  OUTPATIENT OFFICE VISIT NOTE    SUBJECTIVE:      HPI: Ofelia Brady is a 48 y.o. yo female w/ significant PMH of ILD, antisynthetase syndrome who presents today for follow up.     She states she has similar chronic shortness of breath especially on exertion, nothing unusual from her baseline. She denies cough, fever, chills. She follows with rheumatology closely. She is on Ventolin BID, PRN Symbicort and Flovent. She was previously referred to lung transplant in [facility] however her insurance does not cover the transportation going there thus missed her appt.     ROS:  (+) Shortness of breath  (-) Chest pain, palpitations, SOB, fever, night sweats, chills, diarrhea, constipation.       OBJECTIVE:     Vital signs:   BP (!) 168/119   Pulse 98   Ht 4' 7" (1.397 m)   Wt 76 kg (167 lb 8.8 oz)   SpO2 (!) 94%   BMI 38.94 kg/m²      Physical Examination:  General: Obese w/ respiratory distress  HEENT: NC/AT; PERRL; nasal and oral mucosa moist and clear  Neck: Full ROM; no lymphadenopathy  Pulm: Fine crackles bilaterally, more prominent in upper lobes; normal work of breathing on oxygen at 2L/min via NC  CV: S1, S2 w/o murmurs or gallops; no edema noted; mild cyanosis in the lips  GI: Soft with normal bowel sounds in all quadrants, no masses on palpation  MSK: Full ROM of all extremities and spine w/o limitation or discomfort  Derm: Rashes and dry skin in extremities   Neuro: AAOx4; motor/sensory function intact  Psych: Cooperative; appropriate mood and affect    Significant findings:  10/19/2022 - CT chest w/o contrast shows bilateral ground-glass interstitial infiltrate with associated interstitial fibrosis in the periphery of the lungs bilaterally; cystic changes are seen in the periphery of the lungs bilaterally  4/29/2021 - High res CT chest shows increased bilateral groundglass and reticular opacities.  8/14/23 high res CT:  Lung consolidations seen on the prior high-resolution study " dated 04/29/2021   On the current study, there is extensive septal thickening with architectural distortion and bronchiectasis with sparing of the lung apices.  There is only faint basilar predominant ground-glass attenuation which may be related to extensive septal thickening. Suggesting progression of antisynthetase syndrome with fibrosis although ground glass opacities somewhat improved.           ASSESSMENT & PLAN:     Interstitial lung disease   Oxygen dependence  Chronic hypoxic respiratory failure secondary to severe pulmonary fibrosis   Pulmonary nodule  -CT chest 2/2024:  Chronic interstitial changes. Areas of bilateral fibrosis, ground-glass and mild bronchiectasis with similar overall appearance since August. Stable 6 mm right upper lobe nodule   -Continues to use home oxygen at 2L/min, she is compliant with this, receives benefit and should continue using it as prescribed   -Last visit attempted to obtain Trilogy for the patient however patient did not qualify  -Continue current medication regimen Ventolin BID, PRN Symbicort, Flovent   -Previously referred to lung transplant clinic 2021, was at first denied due to obesity, BMI now <40 referral replaced 2023 but her insurance now does not cover transportation to [facility] thus she failed to see them, will replace referral again  -PFT ordered    Rheumatoid arthtritis  Antisynthetase syndrome   -+PL-12, SSA, INES 1:640 cytoplasmic pattern w/ hx of inflammatory arthritis  -Currently seeing Dr. Starr  -On Actemra, Cellcept       Return to clinic in 6 months.     Chester Carrington MD  [facility] Internal Medicine, -II

## 2024-03-28 ENCOUNTER — TELEPHONE (OUTPATIENT)
Dept: TRANSPLANT | Facility: CLINIC | Age: 49
End: 2024-03-28
Payer: MEDICARE

## 2024-03-28 NOTE — TELEPHONE ENCOUNTER
Received another referral for lung transplant consideration. Patient's BMI is 38.94 per clinic note on 3/26/24. Patient canceled or no showed for previous lung transplant consult appointments. Also on 23 our , Cesar sent the following message:    Her Eval auth is set to  and she will not be given an extension because we had to use [employer] contract for transplant services and will not extended her auth.    Message sent to Cesar, , to determine if patient's insurance will be accepted here for lung transplant consideration prior to sending referral to Dr. Stubbs for review.

## 2024-04-04 ENCOUNTER — TELEPHONE (OUTPATIENT)
Dept: TRANSPLANT | Facility: CLINIC | Age: 49
End: 2024-04-04
Payer: MEDICARE

## 2024-04-04 ENCOUNTER — HOSPITAL ENCOUNTER (OUTPATIENT)
Dept: PULMONOLOGY | Facility: HOSPITAL | Age: 49
Discharge: HOME | End: 2024-04-04
Attending: NURSE PRACTITIONER
Payer: MEDICARE

## 2024-04-04 DIAGNOSIS — J84.9 ILD (INTERSTITIAL LUNG DISEASE): ICD-10-CM

## 2024-04-04 DIAGNOSIS — J84.10 PULMONARY FIBROSIS: ICD-10-CM

## 2024-04-04 DIAGNOSIS — Z99.81 OXYGEN DEPENDENT: Primary | ICD-10-CM

## 2024-04-04 DIAGNOSIS — I27.20 PULMONARY HTN: ICD-10-CM

## 2024-04-04 PROCEDURE — 94729 DIFFUSING CAPACITY: CPT | Mod: NTX

## 2024-04-04 PROCEDURE — 94726 PLETHYSMOGRAPHY LUNG VOLUMES: CPT | Mod: TXP

## 2024-04-04 PROCEDURE — 94640 AIRWAY INHALATION TREATMENT: CPT | Mod: 59,NTX

## 2024-04-04 PROCEDURE — 94060 EVALUATION OF WHEEZING: CPT | Mod: TXP

## 2024-04-04 RX ORDER — ALBUTEROL SULFATE 2.5 MG/.5ML
2.5 SOLUTION RESPIRATORY (INHALATION) EVERY 4 HOURS PRN
Status: DISPENSED | OUTPATIENT
Start: 2024-04-04

## 2024-04-04 RX ADMIN — ALBUTEROL SULFATE 2.5 MG: 2.5 SOLUTION RESPIRATORY (INHALATION) at 02:04

## 2024-04-04 NOTE — PROGRESS NOTES
Good cooperation and effort given. Patient unable to exhale to the 6 second mickey on spirometry X 3. Different method use. Albuterol 2.5 mg given /100 , SAT 89%, BBS CL. Patient on 2L home O2. Patient struggled with DLCO. PFT completed.

## 2024-04-04 NOTE — TELEPHONE ENCOUNTER
24 - Message sent to Dr. Stubbs regarding the referral to our program    ----- Message from Grant Guerrero sent at 2024  2:39 PM CDT -----  Regarding: RE: lung transplant referral  [place] Paulina,    Please send me patient clinicals.  I will be calling [employer] for a Transplant case.    Thank you  Cesar  ----- Message -----  From: Paulina Marcano RN  Sent: 2024   9:27 AM CDT  To: Grant Guerrero  Subject: RE: lung transplant referral                     I figured. Please call so I can let the physician no if it is a no.    ----- Message -----  From: Grant Guerrero  Sent: 2024   7:21 AM CDT  To: Paulina Marcano RN  Subject: RE: lung transplant referral                     Good morning Paulina,    This referral and case has been closed.  I will have to call [employer] and see if they will give her a new contract but it is a possible she will not get it.    I will let you know.    Cesar  ----- Message -----  From: Paulina Marcano RN  Sent: 3/28/2024   4:55 PM CDT  To: Grant Guerrero  Subject: lung transplant referral                         Patient was referred to us once again for lung transplant consideration. She never came to clinic despite scheduling multiple appointments. Can you verify her insurance and let me know if she can come or if she will be declined? See below your previous message that you sent on 23:      Good morning Ladies,     Can you give me the status of the above patient. Her Eval auth is set to  and she will not be given an extension because we had to use [employer] contract for transplant services and will not extended her auth.     Thanks   Cesar Josue,sl

## 2024-04-05 LAB
DLCO SINGLE BREATH LLN: 13.04
DLCO SINGLE BREATH PRE REF: 17.5 %
DLCO SINGLE BREATH REF: 18.77
DLCOC SBVA LLN: 3.17
DLCOC SBVA REF: 5.47
DLCOC SINGLE BREATH LLN: 13.04
DLCOC SINGLE BREATH REF: 18.77
DLCOVA LLN: 3.17
DLCOVA PRE REF: 40 %
DLCOVA PRE: 2.19 ML/(MIN*MMHG*L) (ref 3.17–7.77)
DLCOVA REF: 5.47
ERV LLN: -16449.13
ERV PRE REF: 31.5 %
ERV REF: 0.87
FEF 25 75 CHG: -14.8 %
FEF 25 75 LLN: 0.95
FEF 25 75 POST REF: 90.7 %
FEF 25 75 PRE REF: 106.4 %
FEF 25 75 REF: 1.95
FET100 CHG: 63 %
FEV1 CHG: 0.1 %
FEV1 FVC CHG: -3.6 %
FEV1 FVC LLN: 71
FEV1 FVC POST REF: 108.4 %
FEV1 FVC PRE REF: 112.5 %
FEV1 FVC REF: 82
FEV1 LLN: 1.31
FEV1 POST REF: 56.9 %
FEV1 PRE REF: 56.8 %
FEV1 REF: 1.73
FRCPLETH LLN: 1.36
FRCPLETH PREREF: 35 %
FRCPLETH REF: 2.18
FVC CHG: 3.9 %
FVC LLN: 1.6
FVC POST REF: 52.6 %
FVC PRE REF: 50.6 %
FVC REF: 2.1
IVC PRE: 0.85 L (ref 1.6–2.61)
IVC SINGLE BREATH LLN: 1.6
IVC SINGLE BREATH PRE REF: 40.7 %
IVC SINGLE BREATH REF: 2.1
MVV LLN: 58
MVV PRE REF: 58.4 %
MVV REF: 68
PEF CHG: 16 %
PEF LLN: 3.39
PEF POST REF: 84 %
PEF PRE REF: 72.4 %
PEF REF: 4.87
POST FEF 25 75: 1.77 L/S (ref 0.95–3.28)
POST FET 100: 3.75 SEC
POST FEV1 FVC: 89.23 % (ref 71.32–91.64)
POST FEV1: 0.98 L (ref 1.31–2.14)
POST FVC: 1.1 L (ref 1.6–2.61)
POST PEF: 4.09 L/S (ref 3.39–6.35)
PRE DLCO: 3.29 ML/(MIN*MMHG) (ref 13.04–24.5)
PRE ERV: 0.27 L (ref -16449.13–16450.87)
PRE FEF 25 75: 2.07 L/S (ref 0.95–3.28)
PRE FET 100: 2.3 SEC
PRE FEV1 FVC: 92.58 % (ref 71.32–91.64)
PRE FEV1: 0.98 L (ref 1.31–2.14)
PRE FRC PL: 0.76 L (ref 1.36–3)
PRE FVC: 1.06 L (ref 1.6–2.61)
PRE MVV: 39.54 L/MIN (ref 57.57–77.88)
PRE PEF: 3.53 L/S (ref 3.39–6.35)
PRE RV: 0.49 L (ref 0.74–1.89)
PRE TLC: 1.64 L (ref 2.44–4.42)
RAW LLN: 3.06
RAW PRE REF: 202.3 %
RAW PRE: 6.19 CMH2O*S/L (ref 3.06–3.06)
RAW REF: 3.06
RV LLN: 0.74
RV PRE REF: 37.4 %
RV REF: 1.31
RVTLC LLN: 26
RVTLC PRE REF: 84.2 %
RVTLC PRE: 29.98 % (ref 26.03–45.21)
RVTLC REF: 36
TLC LLN: 2.44
TLC PRE REF: 47.7 %
TLC REF: 3.43
VA PRE: 1.5 L (ref 3.28–3.28)
VA SINGLE BREATH LLN: 3.28
VA SINGLE BREATH PRE REF: 45.8 %
VA SINGLE BREATH REF: 3.28
VC LLN: 1.6
VC PRE REF: 54.6 %
VC PRE: 1.15 L (ref 1.6–2.61)
VC REF: 2.1

## 2024-04-08 ENCOUNTER — TELEPHONE (OUTPATIENT)
Dept: TRANSPLANT | Facility: CLINIC | Age: 49
End: 2024-04-08
Payer: MEDICARE

## 2024-04-08 DIAGNOSIS — Z76.82 LUNG TRANSPLANT CANDIDATE: ICD-10-CM

## 2024-04-08 DIAGNOSIS — D89.89 ANTISYNTHETASE SYNDROME: Primary | Chronic | ICD-10-CM

## 2024-04-08 DIAGNOSIS — J84.9 ILD (INTERSTITIAL LUNG DISEASE): ICD-10-CM

## 2024-04-08 NOTE — TELEPHONE ENCOUNTER
"4/24/24 - Letter regarding the scheduled appointment sent to patient's referring physician.    4/23/24 - Received financial clearance. Contacted patient regarding the re-referral to our program and orders for an echo and a 6 minute walk test. Inquired if she is willing to schedule an appointment. Patient confirmed that she wanted to schedule the consult appointment with testing. She requested an afternoon appointment on any Thursday. Informed patient that the  will contact her with the date and times of the appointments once scheduled. She verbalized her understanding.    4/8/24 - Message and clinicals sent to [facility] for financial clearance for consult with a TTE and 6 minute walk test.    ----- Message from Sierra Pederson PA-C sent at 4/5/2024  6:21 PM CDT -----  Regarding: RE: Lung transplant referral  Oh Paulina!    We can see her if she's aware she won't be a candidate until her BMI is 34. Updated TTE and 6MWT and make sure we can see her CT chest. Thanks!      ----- Message -----  From: Paulina Marcano RN  Sent: 4/4/2024   3:11 PM CDT  To: Letitai Stubbs DO  Subject: Lung transplant referral                         Lung Transplant Referral Note    Referral from: Chester Carrington MD / Gautam Muñoz MD    Lung diagnosis:  RA - ILD, antisynthetase syndrome    Age: 48 y.o.    Height/Weight/BMI:  HT: 4'7" / WT: 76 kg / Body mass index is 38.94 kg/m².    Smoking history: Social History    Tobacco Use      Smoking status: Never      Smokeless tobacco: Never    PFT date: 04/04/2024  FVC         1.06 / 50.6  FEV1       0.98 / 56.8  FEV1/FVC     93  TLC         1.64 / 47.7  DLCO      3.29 / 17.5     CXR date: 07/20/2022  Impression:  Right transjugular approach central venous catheter terminates within the right atrium.  There is no pneumothorax.  Otherwise, no significant interval change.    Chest CT date: 02/19/2024  Impression:  Chronic interstitial changes have similar appearance since " 08/14/2023.    Echo date: 08/14/2023    Impression:    ·  Left Ventricle: The left ventricle is normal in size. Normal wall thickness. There is normal systolic function. Biplane (2D) method of discs ejection fraction is 57%.  ·  Right Ventricle: Systolic function is normal.  ·  IVC/SVC: Normal venous pressure at 3 mmHg.    Recommendations?

## 2024-04-08 NOTE — LETTER
April 24, 2024    Chester Carrington MD  [address] [facility] [address]  Phone: [phone]  Fax: [phone]        Dear Dr. Chester Carrington:    Patient: Ofelia Brady   MR Number: [id number]   YOB: 1975     Thank you for the referral of Ofelia Brady to our lung transplant program. An initial appointment with the transplant team has been scheduled for 05/13/24. You will receive an after-visit summary following the completion of your patients appointment in our clinic.    Thank you again for your trust in our program. If there is anything we can do for you or your staff, please feel free to contact us at [phone].    Sincerely,       Letitia Stubbs D.O.  Medical Director, Lung Transplant  Pulmonary & Critical Care Medicine    [facility]  [address] [place] [id number]  [phone]

## 2024-04-30 NOTE — PROGRESS NOTES
Patient ID: [id number]     Chief Complaint: Antiissynthetase syndrome (Pt stated having lt knee pain also c/o of being SOB ellie. On activity)      HPI:     Ofelia Brady is a 49 y.o. female here today for follow up of anti synthetase syndrome.     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 She is on continuous O2 2L per NC and 4L on CPAP at night. She was treated with steroids in past. Patient is on Actemra and Ofev.    Admits SOB with exertion, stable. Not getting worse. No cough. Follows with Pulmonary and Cardiology here.   Admits h/o rash on face and neck, worse in sun.  No rashes today, rash on back and face has improved with the topical steroid cream. Continues to have mild fatigue and tiredness.      May 2024: Here today for follow up. She takes Actemra 162 mg SQ once a week, CellCept 1000 mg po bid and OFEV 100 mg BID.  Lowered Ofev dose due to elevated LFTs. No red, warm and swollen joints. Morning stiffness for an hour. She continues to have right knee pain that will come and go, no swelling, not bothering daily, no injury.   She admits she has a productive cough, mainly at night with a sinus drip- denies any fever/chills- started 2 days ago- noticed since she has been off of her allergy medication (Claritin) and is requesting refills. Has apt with Transplant in [facility] next month.     Denies any recent illness or hospitalizations since last visit.     Denies history of fevers, photosensitivity, oral or nasal ulcers, h/o MI, stroke, seizures, h/o PE or DVT, Raynaud's phenomenon, uveitis, malignancies.   Family history of autoimmune disease: none.  Pregnancies: 7  Miscarriages: 2, both in 4th month.  Smoking: nonsmoker.     Social History     Tobacco Use   Smoking Status Never   Smokeless Tobacco Never          -------------------------------------    Antisynthetase syndrome    HTN (hypertension)     Interstitial lung disease    Obesity, unspecified    Rheumatoid arthritis, unspecified        Past Surgical History:   Procedure Laterality Date     SECTION      X5    RENAL BIOPSY         Review of patient's allergies indicates:  No Known Allergies    Current Outpatient Medications   Medication Sig Dispense Refill    albuterol (PROVENTIL) 2.5 mg /3 mL (0.083 %) nebulizer solution Take 3 mLs by nebulization every 8 (eight) hours as needed.      albuterol-ipratropium (DUO-NEB) 2.5 mg-0.5 mg/3 mL nebulizer solution 3 mLs every 8 (eight) hours as needed.      ALCOHOL PREP PADS PadM SMARTSIG:Pledget(s) Topical As Directed      amLODIPine (NORVASC) 10 MG tablet Take 10 mg by mouth once daily.      aspirin (ECOTRIN) 81 MG EC tablet Take 1 tablet (81 mg total) by mouth once daily. After lunch (Patient taking differently: Take 81 mg by mouth once daily. After lunch, sometimes) 30 tablet 5    budesonide-formoterol 160-4.5 mcg (SYMBICORT) 160-4.5 mcg/actuation HFAA Inhale 1 puff into the lungs daily as needed.      cimetidine (TAGAMET) 800 MG tablet Take 800 mg by mouth every evening.      diclofenac sodium (VOLTAREN) 1 % Gel Apply 1 Tube topically every 6 (six) hours as needed.      ergocalciferol (ERGOCALCIFEROL) 50,000 unit Cap Take 1 capsule by mouth every 7 days.      ezetimibe (ZETIA) 10 mg tablet Take 10 mg by mouth every evening.      famotidine (PEPCID) 20 MG tablet Take 20 mg by mouth 2 (two) times daily.      furosemide (LASIX) 20 MG tablet Take 1 tablet (20 mg total) by mouth daily as needed (for excessive swelling or shortness of breath). 90 tablet 2    gabapentin (NEURONTIN) 300 MG capsule Take 1 capsule (300 mg total) by mouth 2 (two) times a day. 60 capsule 5    ibuprofen (ADVIL,MOTRIN) 800 MG tablet Take 800 mg by mouth 3 (three) times daily as needed.      metoprolol tartrate (LOPRESSOR) 100 MG tablet Take 100 mg by mouth 2 (two) times daily.      NIFEdipine (PROCARDIA-XL) 30 MG (OSM) 24 hr tablet  Take 1 tablet (30 mg total) by mouth once daily. 30 tablet 11    ondansetron (ZOFRAN-ODT) 4 MG TbDL Take 4 mg by mouth every 6 (six) hours as needed.      OXYGEN-AIR DELIVERY SYSTEMS MISC Inhale into the lungs.      SHARPSAFETY CONTAINER Misc USE AS DIRECTED WITH ACTEMRA      traMADoL (ULTRAM) 50 mg tablet Take 50 mg by mouth every 6 (six) hours as needed.      triamcinolone acetonide 0.1% (KENALOG) 0.1 % cream APPLY  CREAM EXTERNALLY TO AFFECTED AREA TWICE DAILY AS NEEDED FOR  RASH 60 g 6    ACTEMRA ACTPEN 162 mg/0.9 mL PnIj Inject 1 mL into the skin every 7 days. 4 each 6    albuterol (PROVENTIL/VENTOLIN HFA) 90 mcg/actuation inhaler Inhale 2 puffs into the lungs every 6 (six) hours. 18 g 2    loratadine (CLARITIN) 10 mg tablet Take 1 tablet (10 mg total) by mouth daily as needed for Allergies. 30 tablet 2    mycophenolate (CELLCEPT) 500 mg Tab Take 2 TABLETS TWICE A  tablet 3    OFEV 100 mg Cap Take 1 capsule by mouth 2 (two) times daily. 60 capsule 6     Current Facility-Administered Medications   Medication Dose Route Frequency Provider Last Rate Last Admin    albuterol sulfate nebulizer solution 2.5 mg  2.5 mg Nebulization Q4H PRN Chester Carrington MD   2.5 mg at 04/04/24 1403       Social History     Socioeconomic History    Marital status: Single   Tobacco Use    Smoking status: Never    Smokeless tobacco: Never   Substance and Sexual Activity    Alcohol use: Never    Drug use: Never    Sexual activity: Yes     Birth control/protection: None        Family History   Problem Relation Name Age of Onset    Hypertension Mother      Diabetes Mother      Heart disease Mother      Hypertension Father      Diabetes Father      Heart disease Sister          Immunization History   Administered Date(s) Administered    Influenza - Quadrivalent - PF *Preferred* (6 months and older) 11/01/2022, 11/03/2023    Pneumococcal Conjugate - 13 Valent 02/04/2021    Pneumococcal Conjugate - 20 Valent 11/06/2023    Zoster  "Recombinant 01/24/2024, 03/11/2024       Patient Care Team:  Chaitanya Cosme NP as PCP - General (Family Medicine)  Evie Richter MD as Consulting Physician (Nephrology)     Subjective:     Constitutional:  Denies chills. Denies fever. Denies night sweats. Denies weight loss.   Ophthalmology: Denies blurred vision. Denies dry eyes. Denies eye pain. Denies Itching and redness.   ENT: Denies oral ulcers. Denies epistaxis. Denies dry mouth. Denies swollen glands. Denies hair loss.   Endocrine: Denies diabetes. Denies thyroid Problems.   Respiratory: Admits productive cough mainly at night x 2 days (yellow sputum). Denies shortness of breath. Admits shortness of breath with exertion-stable. Denies hemoptysis.   Cardiovascular: Denies chest pain at rest. Denies chest pain with exertion. Denies palpitations.    Gastrointestinal: Denies abdominal pain. Denies diarrhea. Denies nausea. Denies vomiting. Denies hematemesis or hematochezia. Denies heartburn.  Genitourinary: Denies blood in urine.  Musculoskeletal: See HPI for details  Integumentary: per HPI  Peripheral Vascular: Denies Ulcers of hands and/or feet. Denies Cold extremities.   Neurologic: Denies dizziness. Denies headache.  Denies loss of strength. Denies numbness or tingling.   Psychiatric: Admits depression- reports currently stable- reports self controlled at this time with relaxation. Denies anxiety. Denies suicidal/homicidal ideations.      Objective:     BP (!) 164/96 (BP Location: Left arm, Patient Position: Sitting, BP Method: Large (Manual))   Pulse 93   Temp 97.9 °F (36.6 °C) (Oral)   Resp 20   Ht 4' 7" (1.397 m)   Wt 78 kg (171 lb 15.3 oz)   SpO2 (!) 93%   BMI 39.97 kg/m²     Physical Exam    General Appearance: alert, pleasant, in no acute distress.  Skin: Skin color, texture, turgor normal. No rashes or lesions. H/o dry macular rash on face around nose and papular rash on back of neck. Rash improved.  Eyes:  extraocular movement " intact (EOMI), pupils equal, round, reactive to light and accommodation, conjunctiva clear.  ENT: No oral or nasal ulcers.  Neck:  Neck supple. No adenopathy.   Lungs: CTA throughout without rhonchi, or wheezes. Fine crackles noted bilaterally   Heart: RRR w/o murmurs.  No edema.   Neuro: Alert, oriented, CN II-XII GI, sensory and motor innervation intact.  Musculoskeletal: No active synovitis. Decrease ROM of bilateral wrists, no swelling. Mild crepitus noted to right knee, left ok, no pain.   Psych: Alert, oriented, normal eye contact.    Labs:   2020:  Anti CCP negative.  Anca, MPO and PR3 negative.  Rheumatoid factor IgA negative, IgG 22, low titer, IgM 13, low titer.  2/2021:  Beta 2 glycoprotein negative.  Lupus anticoagulant not detected.  Cardiolipin IgM 30, slightly elevated, IgG negative.  Negative RNA polymerase 3, dsDNA, RNP, SSB, negative Smith, Scl 70, Tarsha 1.  C3, C4 okay.  TPMT gene mutation not detected.  Urine protein creatinine ratio 157.  Positive INES, 1:640, cytoplasmic pattern.  Positive SSA, 117, high titer.  Negative fibrillin, PM/Scl, Tarsha 1.  Myositis panel showed positive PL 12 antibody.  4/2021:  Elevated IgA 380, normal between 87 to 352.  Elevated IgM, 264, normal between 26 to 217.  Normal IgG level.  CPK elevated 456.  7/2022: ANCA negative. RF IgA and anti CCP negative. C3, C4 ok. dsDNA negative. INES 1:160, cytoplasmic pattern.  WBC count 16.4.  Hemoglobin 11.1.  Creatinine 1.07.  CMP okay. No protein and blood in urine.   07/19/2023: WBC count 18.9, higher than before. Neutrophil count 10.13. Potassium 3.1. ALT elevated 76. CRP 44. ESR 55. Negative hepatitis-B, hepatitis-C, HIV. Phospholipid/cardiolipin IgM 20, weak positive, IgG negative. Beta 2 glycoprotein negative. Lupus anticoagulant not detected. QuantiFERON TB negative. CPK normal. 1+ protein and no blood in urine. Upc 200 milligram/gram. C3, C4 ok. Aldolase elevated 18.7.   8/14/23: AST elevated 72, ALT elevated 158. CRP 12.9.  ESR 52. CPK normal. Aldolase 8.1, elevated. Kappa lambda light chain ratio normal. GGT elevated 117. SPEP and IFA okay, M spike not observed. No monoclonal bands detected. IgG level elevated 1853, normal between 500-1631. IgA elevated 642, normal between . IgM normal.  08/31/2023: GGT 89, improved.  CBC okay.  WBC count improved.  Platelets 421.  , .   9/19/23:  CMP okay, LFTs normalized.  WBC count elevated 13.03, hemoglobin 11.7.  11/6/23:  UPC okay.  Trace protein and no blood in urine.  CMP okay.  LFTs normal.  ESR 60.  CRP normal.  CBC okay.  C3, C4 normal.  CPK normal.  Aldolase normal.  1/24/2024: WBC count 12.13, otherwise CBC okay.  Elevated absolute lymphocyte count 6.9.  Normal lymphocyte count between 0.6-4.6.  CRP 17.5.  Potassium 3.3, CMP okay otherwise.  ESR 47.  CPK normal.  Aldolase 8.3.    Imaging:   TTE on 4/27/2021 showed an EF of 60%, borderline concentric LVH. She had mild TR with RVSP of 57mmHg.     ECHO:  07/20/2022 echocardiogram showed EF normal, 60%.  Systolic function normal.  Low normal right ventricular systolic function.  PASP 62 mmHg.  Moderate pulmonary hypertension.  Moderate pulmonary regurgitation.  08/14/2023 Echocardiogram showed EF 57%, systolic function normal of both right and left ventricle. PASP 26 mmHg.    CT CHEST:   4/2021:  CT chest showed worsened appearance of lungs compared to prior in August 2020 with extensive ground-glass and reticular opacities in both lungs.  No convincing honeycombing.  Mild bronchiectasis favoring the lung bases.  10/19/22:  CT scan of chest showed diffuse bilateral ground-glass interstitial infiltrates associated with interstitial fibrosis in periphery of lungs bilaterally.  Some cystic changes seen in periphery of lungs bilaterally.  No significant change of ILD since April 2021.  Some bronchiectasis seen in upper lobes bilaterally.  No pleural effusion, mediastinum unremarkable, no lymphadenopathy.  08/19/2023 CT chest  showed consolidation seen in April 20, 2021 and October 2022 has resolved. Extensive septal thickening and bronchiectasis with sparing of lung apices. Faint bibasilar predominant ground-glass opacities to extensive septal thickening. There is calcified plaque in LAD  02/22/2024 CT chest: areas of bilateral fibrosis, ground-glass and mild bronchiectasis with overall similar appearance since August 2023.  Stable right upper lobe nodule.  Few borderline enlarged mediastinal lymph nodes.    Right knee 7/2022: There are degenerative osteoarthritic narrowing of the medial and the lateral compartment of the right knee joint spaces.  Medial compartment joint space narrowing is more pronounced and there is subchondral sclerosis and osteophytes.  Articular surfaces alignment is preserved.  No acute fracture or dislocation. Right knee degenerative osteoarthritic changes, unchanged.    7/2022:  Kidney biopsy showed limited sample immunofluorescence negative for immune complex mediated process.  Acute tubular injury.  Mild interstitial fibrosis and atrophy.    Assessment:       ICD-10-CM ICD-9-CM   1. Antisynthetase syndrome  D89.89 279.49   2. Seropositive rheumatoid arthritis of multiple sites  M05.79 714.0   3. ILD (interstitial lung disease)  J84.9 515   4. Pulmonary HTN  I27.20 416.8   5. History of miscarriage  Z87.59 V13.29   6. Rash  R21 782.1   7. Primary hypertension  I10 401.9   8. High risk medication use  Z79.899 V58.69   9. Drug-induced immunodeficiency  D84.821 279.3    Z79.899 E947.9          Plan:       1. 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 She continues to be on home oxygen at 2 L.   - Positive INES associated with myositis. All INOCENTE's negative except for SSA. SSA related to lung disease and myositis. Will check complements, UA and UPC periodically.   - She was OFEV 150 mg BID, dose decreased to 100 mg b.i.d. because of elevated  LFTs.    - Shortness of breath is stable per patient.    - Continue with Actemra 162 mg SQ once a week.    - CT chest showed improvement in 8/2023 and ECHO showed improvement of RVSP in 8/2023. She was not able to do PFT's in 8/2022.   - CT chest 02/22/2024; areas of bilateral fibrosis, ground-glass and mild bronchiectasis with overall similar appearance since August 2023.  Stable right upper lobe nodule.  Few borderline enlarged mediastinal lymph nodes  - Chest CT continues to show ground-glass opacities, Started her on CellCept January 2024  - Stopped Plaquenil January 2024  - Labs today for continued monitoring- she did not return for follow up  lab work since January after starting CellCept  - Infection w/u negative 7/2023    2.+ RF rheumatoid arthritis   H/o inflammatory pattern of joint pain. Continue Actemra.     3. 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 Reports significant symptomatic improvement and increase ability to do ADLs after initiation of Ofev. On 2LNC, stable, no progression in lung disease in CT scan of the chest done in October 2022. Unable to perform PFT. CT chest showed improvement in 8/2023 and ECHO showed improvement of RVSP in 8/2023. Continue follow-up with Pulmonary. She was referred for lung transplant eval, initially canceled several years ago due to obesity, recently had to be rescheduled due to transportation difficulties, has not seen them yet (has apt 5/13/2024)  - Continue to follow up with pulmonary (last seen March 2024)  - Repeat CT chest in February 2024 continued to show bilateral fibrosis, ground-glass opacities, added CellCept to Actemra.    4. Pulmonary hypertension  07/20/2022 echocardiogram showed EF normal, 60%.  Low normal right ventricular systolic function.  PASP 62 mmHg.  Moderate pulmonary hypertension.  Moderate pulmonary regurgitation.  She follows with Cardiology at [facility].    Repeat echocardiogram in August 2023 showed improvement of PASP,  26 mmHg. No need to see pulmonary hypertension specialist for now.    5. Hx of two miscarriages  -Both at 4 months gestation.  2/2021:  Beta 2 glycoprotein negative.  Lupus anticoagulant not detected.  Cardiolipin IgM 30, slightly elevated, IgG negative.    -7/2023: Phospholipid/cardiolipin IgM 20, weak positive, IgG negative. Beta 2 glycoprotein negative. Lupus anticoagulant not detected.   -Takes ASA daily.    6. Rash  - H/o macular rash on face around nose and papular rash on back of neck. Photoprotection discussed.  C/w topical triamcinolone cream as needed. Rash has resolved, on issues at this time.     7. HTN  - Currently elevated today, did take meds as directed, enc to continue to monitor at home, if remains elevated enc to follow-up with PCP/Cardio.  Enc low Na+ diet.     8. High Risk Med Use/Drug Induced Immunodeficency    Persons with rheumatoid arthritis, lupus, psoriatic arthritis and other autoimmune diseases are at increased risk of cardiovascular disease including heart attack and stroke. We recommend that all patients with these conditions have annual health maintenance exams including lipid measurements, blood pressure measurements, and smoking cessation counseling when applicable at their primary care provider's office.   -Advised to stay up-to-date on age appropriate vaccinations and malignancy screening, including yearly skin exams.    -Mammogram UTD, PAP due, Cologuard 8/2023 negative- enc to discuss with PCP to stay on top of yearly screenings  - PCV 13 in February 2021. Pneumovax 20 11/6/23. Zoster 1/24/24, 3/11/24 .      Follow up in about 4 months (around 9/1/2024) for NP Follow Up. In addition to their scheduled follow up, the patient has also been instructed to follow up on as needed basis.      Total time spent with patient and documentation is 45 minutes. All questions were answered to patient's satisfaction and patient verbalized understanding. This includes face to face time and  non-face to face time preparing to see the patient (eg, review of tests), obtaining and/or reviewing separately obtained history, documenting clinical information in the electronic or other health record, independently interpreting results and communicating results to the patient/family/caregiver, or care coordinator.

## 2024-05-01 ENCOUNTER — OFFICE VISIT (OUTPATIENT)
Dept: RHEUMATOLOGY | Facility: CLINIC | Age: 49
End: 2024-05-01
Payer: MEDICARE

## 2024-05-01 ENCOUNTER — LAB VISIT (OUTPATIENT)
Dept: LAB | Facility: HOSPITAL | Age: 49
End: 2024-05-01
Attending: NURSE PRACTITIONER
Payer: MEDICARE

## 2024-05-01 VITALS
TEMPERATURE: 98 F | HEART RATE: 93 BPM | OXYGEN SATURATION: 93 % | SYSTOLIC BLOOD PRESSURE: 164 MMHG | HEIGHT: 55 IN | BODY MASS INDEX: 39.79 KG/M2 | WEIGHT: 171.94 LBS | DIASTOLIC BLOOD PRESSURE: 96 MMHG | RESPIRATION RATE: 20 BRPM

## 2024-05-01 DIAGNOSIS — I27.20 PULMONARY HTN: ICD-10-CM

## 2024-05-01 DIAGNOSIS — Z87.448 HISTORY OF ACUTE RENAL FAILURE: ICD-10-CM

## 2024-05-01 DIAGNOSIS — J84.9 ILD (INTERSTITIAL LUNG DISEASE): ICD-10-CM

## 2024-05-01 DIAGNOSIS — E83.42 HYPOMAGNESEMIA: ICD-10-CM

## 2024-05-01 DIAGNOSIS — D84.821 DRUG-INDUCED IMMUNODEFICIENCY: ICD-10-CM

## 2024-05-01 DIAGNOSIS — D89.89 ANTISYNTHETASE SYNDROME: ICD-10-CM

## 2024-05-01 DIAGNOSIS — Z79.899 HIGH RISK MEDICATION USE: ICD-10-CM

## 2024-05-01 DIAGNOSIS — Z79.899 DRUG-INDUCED IMMUNODEFICIENCY: ICD-10-CM

## 2024-05-01 DIAGNOSIS — E55.9 VITAMIN D DEFICIENCY, UNSPECIFIED: ICD-10-CM

## 2024-05-01 DIAGNOSIS — I10 PRIMARY HYPERTENSION: ICD-10-CM

## 2024-05-01 DIAGNOSIS — D89.89 ANTISYNTHETASE SYNDROME: Primary | ICD-10-CM

## 2024-05-01 DIAGNOSIS — M05.79 SEROPOSITIVE RHEUMATOID ARTHRITIS OF MULTIPLE SITES: ICD-10-CM

## 2024-05-01 DIAGNOSIS — R21 RASH: ICD-10-CM

## 2024-05-01 DIAGNOSIS — Z87.59 HISTORY OF MISCARRIAGE: ICD-10-CM

## 2024-05-01 LAB
ABS NEUT CALC (OHS): 7.05 X10(3)/MCL (ref 2.1–9.2)
ALBUMIN SERPL-MCNC: 4.2 G/DL (ref 3.5–5)
ALBUMIN/GLOB SERPL: 0.9 RATIO (ref 1.1–2)
ALP SERPL-CCNC: 98 UNIT/L (ref 40–150)
ALT SERPL-CCNC: 20 UNIT/L (ref 0–55)
APPEARANCE UR: CLEAR
AST SERPL-CCNC: 22 UNIT/L (ref 5–34)
BACTERIA #/AREA URNS AUTO: ABNORMAL /HPF
BILIRUB SERPL-MCNC: 0.4 MG/DL
BILIRUB UR QL STRIP.AUTO: NEGATIVE
BUN SERPL-MCNC: 9.8 MG/DL (ref 9.8–20.1)
C3 SERPL-MCNC: 175 MG/DL (ref 80–173)
C4 SERPL-MCNC: 17 MG/DL (ref 13–46)
CALCIUM SERPL-MCNC: 10.2 MG/DL (ref 8.4–10.2)
CHLORIDE SERPL-SCNC: 103 MMOL/L (ref 98–107)
CK SERPL-CCNC: 65 U/L (ref 29–168)
CO2 SERPL-SCNC: 27 MMOL/L (ref 22–29)
COLOR UR AUTO: ABNORMAL
CREAT SERPL-MCNC: 0.86 MG/DL (ref 0.55–1.02)
CREAT UR-MCNC: 115.8 MG/DL (ref 45–106)
CRP SERPL-MCNC: 1.2 MG/L
DEPRECATED CALCIDIOL+CALCIFEROL SERPL-MC: 22.5 NG/ML (ref 30–80)
EOSINOPHIL NFR BLD MANUAL: 1.2 X10(3)/MCL (ref 0–0.9)
EOSINOPHIL NFR BLD MANUAL: 8 % (ref 0–8)
ERYTHROCYTE [DISTWIDTH] IN BLOOD BY AUTOMATED COUNT: 14.6 % (ref 11.5–17)
ERYTHROCYTE [SEDIMENTATION RATE] IN BLOOD: 23 MM/HR (ref 0–20)
GFR SERPLBLD CREATININE-BSD FMLA CKD-EPI: >60 MLS/MIN/1.73/M2
GLOBULIN SER-MCNC: 4.8 GM/DL (ref 2.4–3.5)
GLUCOSE SERPL-MCNC: 92 MG/DL (ref 74–100)
GLUCOSE UR QL STRIP.AUTO: NORMAL
HCT VFR BLD AUTO: 42.6 % (ref 37–47)
HGB BLD-MCNC: 13.3 G/DL (ref 12–16)
HYALINE CASTS #/AREA URNS LPF: ABNORMAL /LPF
KETONES UR QL STRIP.AUTO: NEGATIVE
LEUKOCYTE ESTERASE UR QL STRIP.AUTO: 75
LYMPHOCYTES NFR BLD MANUAL: 35 % (ref 13–40)
LYMPHOCYTES NFR BLD MANUAL: 5.25 X10(3)/MCL
MAGNESIUM SERPL-MCNC: 1.7 MG/DL (ref 1.6–2.6)
MCH RBC QN AUTO: 27.3 PG (ref 27–31)
MCHC RBC AUTO-ENTMCNC: 31.2 G/DL (ref 33–36)
MCV RBC AUTO: 87.3 FL (ref 80–94)
MONOCYTES NFR BLD MANUAL: 1.5 X10(3)/MCL (ref 0.1–1.3)
MONOCYTES NFR BLD MANUAL: 10 % (ref 2–11)
MUCOUS THREADS URNS QL MICRO: ABNORMAL /LPF
NEUTROPHILS NFR BLD MANUAL: 47 % (ref 47–80)
NITRITE UR QL STRIP.AUTO: NEGATIVE
NRBC BLD AUTO-RTO: 0 %
PH UR STRIP.AUTO: 6 [PH]
PLATELET # BLD AUTO: 270 X10(3)/MCL (ref 130–400)
PLATELET # BLD EST: ADEQUATE 10*3/UL
PMV BLD AUTO: 9.9 FL (ref 7.4–10.4)
POTASSIUM SERPL-SCNC: 3.9 MMOL/L (ref 3.5–5.1)
PROT SERPL-MCNC: 9 GM/DL (ref 6.4–8.3)
PROT UR QL STRIP.AUTO: ABNORMAL
PROT UR STRIP-MCNC: 16.6 MG/DL
RBC # BLD AUTO: 4.88 X10(6)/MCL (ref 4.2–5.4)
RBC #/AREA URNS AUTO: ABNORMAL /HPF
RBC MORPH BLD: NORMAL
RBC UR QL AUTO: NEGATIVE
SODIUM SERPL-SCNC: 142 MMOL/L (ref 136–145)
SP GR UR STRIP.AUTO: 1.02 (ref 1–1.03)
SQUAMOUS #/AREA URNS LPF: ABNORMAL /HPF
T VAGINALIS URNS QL MICRO: ABNORMAL /HPF
URINE PROTEIN/CREATININE RATIO (OLG): 0.1
UROBILINOGEN UR STRIP-ACNC: NORMAL
WBC # SPEC AUTO: 15.01 X10(3)/MCL (ref 4.5–11.5)
WBC #/AREA URNS AUTO: ABNORMAL /HPF

## 2024-05-01 PROCEDURE — 36415 COLL VENOUS BLD VENIPUNCTURE: CPT | Mod: NTX

## 2024-05-01 PROCEDURE — 82570 ASSAY OF URINE CREATININE: CPT | Mod: TXP

## 2024-05-01 PROCEDURE — 86140 C-REACTIVE PROTEIN: CPT | Mod: NTX

## 2024-05-01 PROCEDURE — 86160 COMPLEMENT ANTIGEN: CPT | Mod: 59,NTX

## 2024-05-01 PROCEDURE — 85007 BL SMEAR W/DIFF WBC COUNT: CPT | Mod: TXP

## 2024-05-01 PROCEDURE — 82085 ASSAY OF ALDOLASE: CPT | Mod: TXP

## 2024-05-01 PROCEDURE — 82550 ASSAY OF CK (CPK): CPT | Mod: TXP

## 2024-05-01 PROCEDURE — 86160 COMPLEMENT ANTIGEN: CPT | Mod: TXP

## 2024-05-01 PROCEDURE — 80053 COMPREHEN METABOLIC PANEL: CPT | Mod: NTX

## 2024-05-01 PROCEDURE — 84156 ASSAY OF PROTEIN URINE: CPT | Mod: TXP

## 2024-05-01 PROCEDURE — 1159F MED LIST DOCD IN RCRD: CPT | Mod: CPTII,NTX,, | Performed by: NURSE PRACTITIONER

## 2024-05-01 PROCEDURE — 3077F SYST BP >= 140 MM HG: CPT | Mod: CPTII,NTX,, | Performed by: NURSE PRACTITIONER

## 2024-05-01 PROCEDURE — 99215 OFFICE O/P EST HI 40 MIN: CPT | Mod: PBBFAC,NTX | Performed by: NURSE PRACTITIONER

## 2024-05-01 PROCEDURE — 3008F BODY MASS INDEX DOCD: CPT | Mod: CPTII,NTX,, | Performed by: NURSE PRACTITIONER

## 2024-05-01 PROCEDURE — 99215 OFFICE O/P EST HI 40 MIN: CPT | Mod: S$PBB,NTX,, | Performed by: NURSE PRACTITIONER

## 2024-05-01 PROCEDURE — 83735 ASSAY OF MAGNESIUM: CPT | Mod: NTX

## 2024-05-01 PROCEDURE — 1160F RVW MEDS BY RX/DR IN RCRD: CPT | Mod: CPTII,NTX,, | Performed by: NURSE PRACTITIONER

## 2024-05-01 PROCEDURE — 3080F DIAST BP >= 90 MM HG: CPT | Mod: CPTII,NTX,, | Performed by: NURSE PRACTITIONER

## 2024-05-01 PROCEDURE — 85652 RBC SED RATE AUTOMATED: CPT | Mod: NTX

## 2024-05-01 PROCEDURE — 81001 URINALYSIS AUTO W/SCOPE: CPT | Mod: TXP

## 2024-05-01 PROCEDURE — 82306 VITAMIN D 25 HYDROXY: CPT | Mod: NTX

## 2024-05-01 RX ORDER — TOCILIZUMAB 180 MG/ML
1 INJECTION, SOLUTION SUBCUTANEOUS
Qty: 4 EACH | Refills: 6 | Status: SHIPPED | OUTPATIENT
Start: 2024-05-01 | End: 2024-06-19 | Stop reason: SDUPTHER

## 2024-05-01 RX ORDER — ALBUTEROL SULFATE 90 UG/1
2 AEROSOL, METERED RESPIRATORY (INHALATION) EVERY 6 HOURS
Qty: 18 G | Refills: 2 | Status: SHIPPED | OUTPATIENT
Start: 2024-05-01

## 2024-05-01 RX ORDER — NINTEDANIB 100 MG/1
1 CAPSULE ORAL 2 TIMES DAILY
Qty: 60 CAPSULE | Refills: 6 | Status: SHIPPED | OUTPATIENT
Start: 2024-05-01 | End: 2024-06-19 | Stop reason: SDUPTHER

## 2024-05-01 RX ORDER — LORATADINE 10 MG/1
10 TABLET ORAL DAILY PRN
Qty: 30 TABLET | Refills: 2 | Status: SHIPPED | OUTPATIENT
Start: 2024-05-01

## 2024-05-01 RX ORDER — MYCOPHENOLATE MOFETIL 500 MG/1
TABLET ORAL
Qty: 120 TABLET | Refills: 3 | Status: SHIPPED | OUTPATIENT
Start: 2024-05-01 | End: 2024-06-19 | Stop reason: SDUPTHER

## 2024-05-01 RX ORDER — FAMOTIDINE 20 MG/1
20 TABLET, FILM COATED ORAL 2 TIMES DAILY
COMMUNITY

## 2024-05-04 LAB — ALDOLASE SERPL-CCNC: 8.6 U/L

## 2024-05-09 ENCOUNTER — TELEPHONE (OUTPATIENT)
Dept: RHEUMATOLOGY | Facility: CLINIC | Age: 49
End: 2024-05-09
Payer: MEDICARE

## 2024-05-09 NOTE — TELEPHONE ENCOUNTER
Spoke with Berna from [facility] spec clarification needed on pts script Actemra 162 mg/ 0.9mL as order was written as injection 1mL.  Berna requesting direction to be changed to injection 1 Pen QW. Change confirmed.

## 2024-05-10 ENCOUNTER — TELEPHONE (OUTPATIENT)
Dept: TRANSPLANT | Facility: CLINIC | Age: 49
End: 2024-05-10
Payer: MEDICARE

## 2024-05-10 ENCOUNTER — TELEPHONE (OUTPATIENT)
Dept: RHEUMATOLOGY | Facility: CLINIC | Age: 49
End: 2024-05-10
Payer: MEDICARE

## 2024-05-10 NOTE — TELEPHONE ENCOUNTER
Spoke with pt informed pt of message. Pt verbalized understanding.stated still has cough pt was instructed to f/u with PCP.                    ----- Message from CHERYL Sarmiento sent at 5/10/2024  9:17 AM CDT -----  Please advise the patient the following lab results. Overall lab noted to be clinically acceptable at this time and stable with the exception of her WBC which is noted to be elevated- she did report a cough with PND that started 2 days prior to her visit- please inquire if symptoms worsened or if she has followed up with PCP- if she has not, I would like to repeat her CBC and get a Cxray - please keep me posted

## 2024-05-10 NOTE — TELEPHONE ENCOUNTER
----- Message from Sierra Ontiveros sent at 5/10/2024  2:24 PM CDT -----  Regarding: Appointments      Name Of Caller:   Ofelia      Contact Preference:   [phone]       Nature of call:   Pt would like to reschedule her upcoming appts. She won't be able to make it on Monday 05/13.    Contacted patient. She requested that we reschedule her appointments to a Wednesday afternoon in June. Informed her that the  has left for the day. Informed her that I would have the  contact her next week with the new appointment date and times. She verbalized her understanding and denied having any further questions or concerns.

## 2024-05-10 NOTE — TELEPHONE ENCOUNTER
Spoke with pt informed pt she need to go to ER per Hannah BURGER NP b/c of evaluated WBC, still with cough and her being on transplant list. Pt did deny fever or chills at this time. Pt stated will go to [facility] ER in [place].

## 2024-05-13 ENCOUNTER — TELEPHONE (OUTPATIENT)
Dept: RHEUMATOLOGY | Facility: CLINIC | Age: 49
End: 2024-05-13
Payer: MEDICARE

## 2024-05-13 DIAGNOSIS — D89.89 ANTISYNTHETASE SYNDROME: Primary | ICD-10-CM

## 2024-05-13 DIAGNOSIS — J84.9 ILD (INTERSTITIAL LUNG DISEASE): ICD-10-CM

## 2024-05-13 NOTE — TELEPHONE ENCOUNTER
Ms KALEB can we reach out to Ms Gilbert to see if she presented to ER for eval and outcome. Please keep me posted.

## 2024-05-14 NOTE — TELEPHONE ENCOUNTER
Spoke to pt informed of needing lab work and CX pt verbalized understanding. Stated will come in on 5/16/24 to do tests

## 2024-05-28 ENCOUNTER — HOSPITAL ENCOUNTER (OUTPATIENT)
Dept: RADIOLOGY | Facility: HOSPITAL | Age: 49
Discharge: HOME | End: 2024-05-28
Attending: NURSE PRACTITIONER
Payer: MEDICARE

## 2024-05-28 DIAGNOSIS — J84.9 ILD (INTERSTITIAL LUNG DISEASE): ICD-10-CM

## 2024-05-28 DIAGNOSIS — D89.89 ANTISYNTHETASE SYNDROME: ICD-10-CM

## 2024-05-28 PROCEDURE — 71046 X-RAY EXAM CHEST 2 VIEWS: CPT | Mod: TC,TXP

## 2024-05-31 ENCOUNTER — TELEPHONE (OUTPATIENT)
Dept: RHEUMATOLOGY | Facility: CLINIC | Age: 49
End: 2024-05-31
Payer: MEDICARE

## 2024-05-31 NOTE — TELEPHONE ENCOUNTER
----- Message from CHERYL Sarmiento sent at 5/31/2024 10:42 AM CDT -----  Please advise the patient that her WBC has improved only slightly, still mildly elevated- we will repeat at her upcoming OV next month with further lab

## 2024-06-19 ENCOUNTER — OFFICE VISIT (OUTPATIENT)
Dept: RHEUMATOLOGY | Facility: CLINIC | Age: 49
End: 2024-06-19
Payer: MEDICARE

## 2024-06-19 VITALS
DIASTOLIC BLOOD PRESSURE: 102 MMHG | HEIGHT: 55 IN | SYSTOLIC BLOOD PRESSURE: 134 MMHG | RESPIRATION RATE: 18 BRPM | HEART RATE: 103 BPM | WEIGHT: 168.63 LBS | BODY MASS INDEX: 39.03 KG/M2 | TEMPERATURE: 98 F | OXYGEN SATURATION: 93 %

## 2024-06-19 DIAGNOSIS — J84.9 ILD (INTERSTITIAL LUNG DISEASE): ICD-10-CM

## 2024-06-19 DIAGNOSIS — I27.20 PULMONARY HTN: ICD-10-CM

## 2024-06-19 DIAGNOSIS — Z79.899 HIGH RISK MEDICATION USE: ICD-10-CM

## 2024-06-19 DIAGNOSIS — I10 PRIMARY HYPERTENSION: ICD-10-CM

## 2024-06-19 DIAGNOSIS — D89.89 ANTISYNTHETASE SYNDROME: Primary | ICD-10-CM

## 2024-06-19 DIAGNOSIS — Z87.59 HISTORY OF MISCARRIAGE: ICD-10-CM

## 2024-06-19 DIAGNOSIS — R21 RASH: ICD-10-CM

## 2024-06-19 DIAGNOSIS — Z79.899 DRUG-INDUCED IMMUNODEFICIENCY: ICD-10-CM

## 2024-06-19 DIAGNOSIS — M05.79 SEROPOSITIVE RHEUMATOID ARTHRITIS OF MULTIPLE SITES: ICD-10-CM

## 2024-06-19 DIAGNOSIS — R76.8 POSITIVE ANA (ANTINUCLEAR ANTIBODY): ICD-10-CM

## 2024-06-19 DIAGNOSIS — D84.821 DRUG-INDUCED IMMUNODEFICIENCY: ICD-10-CM

## 2024-06-19 PROCEDURE — 3075F SYST BP GE 130 - 139MM HG: CPT | Mod: CPTII,NTX,, | Performed by: INTERNAL MEDICINE

## 2024-06-19 PROCEDURE — 99215 OFFICE O/P EST HI 40 MIN: CPT | Mod: S$PBB,NTX,, | Performed by: INTERNAL MEDICINE

## 2024-06-19 PROCEDURE — 99215 OFFICE O/P EST HI 40 MIN: CPT | Mod: PBBFAC,TXP | Performed by: INTERNAL MEDICINE

## 2024-06-19 PROCEDURE — 3080F DIAST BP >= 90 MM HG: CPT | Mod: CPTII,NTX,, | Performed by: INTERNAL MEDICINE

## 2024-06-19 PROCEDURE — 1159F MED LIST DOCD IN RCRD: CPT | Mod: CPTII,NTX,, | Performed by: INTERNAL MEDICINE

## 2024-06-19 PROCEDURE — G2211 COMPLEX E/M VISIT ADD ON: HCPCS | Mod: S$PBB,NTX,, | Performed by: INTERNAL MEDICINE

## 2024-06-19 PROCEDURE — 3008F BODY MASS INDEX DOCD: CPT | Mod: CPTII,NTX,, | Performed by: INTERNAL MEDICINE

## 2024-06-19 RX ORDER — OMEPRAZOLE 40 MG/1
40 CAPSULE, DELAYED RELEASE ORAL
COMMUNITY

## 2024-06-19 RX ORDER — TOCILIZUMAB 180 MG/ML
1 INJECTION, SOLUTION SUBCUTANEOUS
Qty: 4 EACH | Refills: 6 | Status: SHIPPED | OUTPATIENT
Start: 2024-06-19

## 2024-06-19 RX ORDER — MYCOPHENOLATE MOFETIL 500 MG/1
TABLET ORAL
Qty: 120 TABLET | Refills: 3 | Status: SHIPPED | OUTPATIENT
Start: 2024-06-19

## 2024-06-19 RX ORDER — NINTEDANIB 100 MG/1
1 CAPSULE ORAL 2 TIMES DAILY
Qty: 60 CAPSULE | Refills: 6 | Status: SHIPPED | OUTPATIENT
Start: 2024-06-19

## 2024-06-19 NOTE — PROGRESS NOTES
Patient ID: [id number]     Chief Complaint: No chief complaint on file.      HPI:     Ofelia Brady is a 49 y.o. female here today for follow up of anti synthetase syndrome.     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 She is on continuous O2 2L per NC and 4L on CPAP at night. She was treated with steroids in past. Patient is on Actemra and Ofev.    Admits SOB with exertion, stable.Admits intermittent cough secondary to sinus issues, shortness of breath worse when she gets sinus issues, otherwise shortness of breath is stable  Follows with Pulmonary and Cardiology here.  Scheduled to be seen by Pulmonary transplant team in [place] in 2024.  Admits h/o rash on face and neck, worse in sun.  No rashes today, rash on back and face has improved with the topical steroid cream. Continues to have mild fatigue and tiredness.    She takes Actemra 162 mg SQ once a week, CellCept 1000 mg po bid and OFEV 100 mg BID.  Lowered Ofev dose due to elevated LFTs. No red, warm and swollen joints. Morning stiffness for an hour. S  he continues to have right knee pain that will come and go, no swelling, not bothering daily, no injury.     Denies any recent illness or hospitalizations since last visit.     Denies history of fevers, photosensitivity, oral or nasal ulcers, h/o MI, stroke, seizures, h/o PE or DVT, Raynaud's phenomenon, uveitis, malignancies.   Family history of autoimmune disease: none.  Pregnancies: 7  Miscarriages: 2, both in 4th month.  Smoking: nonsmoker.     Social History     Tobacco Use   Smoking Status Never   Smokeless Tobacco Never          -------------------------------------    Antisynthetase syndrome    HTN (hypertension)    Interstitial lung disease    Obesity, unspecified    Rheumatoid arthritis, unspecified        Past Surgical History:   Procedure Laterality Date     SECTION      X5     RENAL BIOPSY         Review of patient's allergies indicates:  No Known Allergies    Outpatient Medications Marked as Taking for the 6/19/24 encounter (Office Visit) with Sarah Starr MD   Medication Sig Dispense Refill    albuterol (PROVENTIL) 2.5 mg /3 mL (0.083 %) nebulizer solution Take 3 mLs by nebulization every 8 (eight) hours as needed.      albuterol (PROVENTIL/VENTOLIN HFA) 90 mcg/actuation inhaler Inhale 2 puffs into the lungs every 6 (six) hours. 18 g 2    albuterol-ipratropium (DUO-NEB) 2.5 mg-0.5 mg/3 mL nebulizer solution 3 mLs every 8 (eight) hours as needed.      ALCOHOL PREP PADS PadM SMARTSIG:Pledget(s) Topical As Directed      amLODIPine (NORVASC) 10 MG tablet Take 10 mg by mouth once daily.      aspirin (ECOTRIN) 81 MG EC tablet Take 1 tablet (81 mg total) by mouth once daily. After lunch (Patient taking differently: Take 81 mg by mouth once daily. After lunch, sometimes) 30 tablet 5    budesonide-formoterol 160-4.5 mcg (SYMBICORT) 160-4.5 mcg/actuation HFAA Inhale 1 puff into the lungs daily as needed.      cimetidine (TAGAMET) 800 MG tablet Take 800 mg by mouth every evening.      diclofenac sodium (VOLTAREN) 1 % Gel Apply 1 Tube topically every 6 (six) hours as needed.      ergocalciferol (ERGOCALCIFEROL) 50,000 unit Cap Take 1 capsule by mouth every 7 days.      ezetimibe (ZETIA) 10 mg tablet Take 10 mg by mouth every evening.      famotidine (PEPCID) 20 MG tablet Take 20 mg by mouth 2 (two) times daily.      furosemide (LASIX) 20 MG tablet Take 1 tablet (20 mg total) by mouth daily as needed (for excessive swelling or shortness of breath). 90 tablet 2    gabapentin (NEURONTIN) 300 MG capsule Take 1 capsule (300 mg total) by mouth 2 (two) times a day. 60 capsule 5    ibuprofen (ADVIL,MOTRIN) 800 MG tablet Take 800 mg by mouth 3 (three) times daily as needed.      loratadine (CLARITIN) 10 mg tablet Take 1 tablet (10 mg total) by mouth daily as needed for Allergies. 30 tablet 2     metoprolol tartrate (LOPRESSOR) 100 MG tablet Take 100 mg by mouth 2 (two) times daily.      NIFEdipine (PROCARDIA-XL) 30 MG (OSM) 24 hr tablet Take 1 tablet (30 mg total) by mouth once daily. 30 tablet 11    omeprazole (PRILOSEC) 40 MG capsule Take 40 mg by mouth.      ondansetron (ZOFRAN-ODT) 4 MG TbDL Take 4 mg by mouth every 6 (six) hours as needed.      OXYGEN-AIR DELIVERY SYSTEMS MISC Inhale into the lungs.      traMADoL (ULTRAM) 50 mg tablet Take 50 mg by mouth every 6 (six) hours as needed.      triamcinolone acetonide 0.1% (KENALOG) 0.1 % cream APPLY  CREAM EXTERNALLY TO AFFECTED AREA TWICE DAILY AS NEEDED FOR  RASH 60 g 6    [DISCONTINUED] ACTEMRA ACTPEN 162 mg/0.9 mL PnIj Inject 1 mL into the skin every 7 days. 4 each 6    [DISCONTINUED] mycophenolate (CELLCEPT) 500 mg Tab Take 2 TABLETS TWICE A  tablet 3    [DISCONTINUED] OFEV 100 mg Cap Take 1 capsule by mouth 2 (two) times daily. 60 capsule 6     Current Facility-Administered Medications for the 6/19/24 encounter (Office Visit) with Sarah Starr MD   Medication Dose Route Frequency Provider Last Rate Last Admin    albuterol sulfate nebulizer solution 2.5 mg  2.5 mg Nebulization Q4H PRN Chester Carrington MD   2.5 mg at 04/04/24 1403       Social History     Socioeconomic History    Marital status: Single   Tobacco Use    Smoking status: Never    Smokeless tobacco: Never   Substance and Sexual Activity    Alcohol use: Never    Drug use: Never    Sexual activity: Yes     Birth control/protection: None        Family History   Problem Relation Name Age of Onset    Hypertension Mother      Diabetes Mother      Heart disease Mother      Hypertension Father      Diabetes Father      Heart disease Sister          Immunization History   Administered Date(s) Administered    Influenza - Quadrivalent - PF *Preferred* (6 months and older) 11/01/2022, 11/03/2023    Pneumococcal Conjugate - 13 Valent 02/04/2021    Pneumococcal Conjugate - 20 Valent 11/06/2023  "   Zoster Recombinant 01/24/2024, 03/11/2024       Patient Care Team:  Chaitanya Cosme NP as PCP - General (Family Medicine)  Evie Richter MD as Consulting Physician (Nephrology)     Subjective:     Constitutional:  Denies chills. Denies fever. Denies night sweats. Denies weight loss.   Ophthalmology: Denies blurred vision. Denies dry eyes. Denies eye pain. Denies Itching and redness.   ENT: Denies oral ulcers. Denies epistaxis. Denies dry mouth. Denies swollen glands. Denies hair loss.   Endocrine: Denies diabetes. Denies thyroid Problems.   Respiratory: Admits productive cough mainly at night x 2 days (yellow sputum). Denies shortness of breath. Admits shortness of breath with exertion-stable. Denies hemoptysis.   Cardiovascular: Denies chest pain at rest. Denies chest pain with exertion. Denies palpitations.    Gastrointestinal: Denies abdominal pain. Denies diarrhea. Denies nausea. Denies vomiting. Denies hematemesis or hematochezia. Denies heartburn.  Genitourinary: Denies blood in urine.  Musculoskeletal: See HPI for details  Integumentary: per HPI  Peripheral Vascular: Denies Ulcers of hands and/or feet. Denies Cold extremities.   Neurologic: Denies dizziness. Denies headache.  Denies loss of strength. Denies numbness or tingling.   Psychiatric: Admits depression- reports currently stable- reports self controlled at this time with relaxation. Denies anxiety. Denies suicidal/homicidal ideations.      Objective:     BP (!) 134/102 (BP Location: Left arm, Patient Position: Sitting, BP Method: Medium (Manual))   Pulse 103   Temp 98.2 °F (36.8 °C) (Oral)   Resp 18   Ht 4' 7" (1.397 m)   Wt 76.5 kg (168 lb 10.4 oz)   SpO2 (!) 93% Comment: oxygen @3L  BMI 39.20 kg/m²     Physical Exam    General Appearance: alert, pleasant, in no acute distress.  Skin: Skin color, texture, turgor normal. No rashes or lesions. H/o dry macular rash on face around nose and papular rash on back of neck. Rash " improved.  Eyes:  extraocular movement intact (EOMI), pupils equal, round, reactive to light and accommodation, conjunctiva clear.  ENT: No oral or nasal ulcers.  Neck:  Neck supple. No adenopathy.   Lungs: CTA throughout without rhonchi, or wheezes. Fine crackles noted bilaterally   Heart: RRR w/o murmurs.  No edema.   Neuro: Alert, oriented, CN II-XII GI, sensory and motor innervation intact.  Musculoskeletal: No active synovitis. Decrease ROM of bilateral wrists, no swelling. Mild crepitus noted to right knee, left ok, no pain.   Psych: Alert, oriented, normal eye contact.    Labs:   2020:  Anti CCP negative.  Anca, MPO and PR3 negative.  Rheumatoid factor IgA negative, IgG 22, low titer, IgM 13, low titer.  2/2021:  Beta 2 glycoprotein negative.  Lupus anticoagulant not detected.  Cardiolipin IgM 30, slightly elevated, IgG negative.  Negative RNA polymerase 3, dsDNA, RNP, SSB, negative Smith, Scl 70, Tarsha 1.  C3, C4 okay.  TPMT gene mutation not detected.  Urine protein creatinine ratio 157.  Positive INES, 1:640, cytoplasmic pattern.  Positive SSA, 117, high titer.  Negative fibrillin, PM/Scl, Tarsha 1.  Myositis panel showed positive PL 12 antibody.  4/2021:  Elevated IgA 380, normal between 87 to 352.  Elevated IgM, 264, normal between 26 to 217.  Normal IgG level.  CPK elevated 456.  7/2022: ANCA negative. RF IgA and anti CCP negative. C3, C4 ok. dsDNA negative. INES 1:160, cytoplasmic pattern.  WBC count 16.4.  Hemoglobin 11.1.  Creatinine 1.07.  CMP okay. No protein and blood in urine.   07/19/2023: WBC count 18.9, higher than before. Neutrophil count 10.13. Potassium 3.1. ALT elevated 76. CRP 44. ESR 55. Negative hepatitis-B, hepatitis-C, HIV. Phospholipid/cardiolipin IgM 20, weak positive, IgG negative. Beta 2 glycoprotein negative. Lupus anticoagulant not detected. QuantiFERON TB negative. CPK normal. 1+ protein and no blood in urine. Upc 200 milligram/gram. C3, C4 ok. Aldolase elevated 18.7.   8/14/23: AST  elevated 72, ALT elevated 158. CRP 12.9. ESR 52. CPK normal. Aldolase 8.1, elevated. Kappa lambda light chain ratio normal. GGT elevated 117. SPEP and IFA okay, M spike not observed. No monoclonal bands detected. IgG level elevated 1853, normal between 500-1631. IgA elevated 642, normal between . IgM normal.  08/31/2023: GGT 89, improved.  CBC okay.  WBC count improved.  Platelets 421.  , .   9/19/23:  CMP okay, LFTs normalized.  WBC count elevated 13.03, hemoglobin 11.7.  11/6/23:  UPC okay.  Trace protein and no blood in urine.  CMP okay.  LFTs normal.  ESR 60.  CRP normal.  CBC okay.  C3, C4 normal.  CPK normal.  Aldolase normal.  1/24/2024: WBC count 12.13, otherwise CBC okay.  Elevated absolute lymphocyte count 6.9.  Normal lymphocyte count between 0.6-4.6.  CRP 17.5.  Potassium 3.3, CMP okay otherwise.  ESR 47.  CPK normal.  Aldolase 8.3.  5/1/24: CRP normal. CPK normal. CMP ok. UPC ok. Sed Rate 23 (<20), C3/C4 ok.CRP 1.20 (<5), CPK 65 ok.  Aldolase 8.6, stable compared with the past. WBC 15.01, eosinophils 1.2008 (0.1-1.3). Urine trace protein no blood noted.   05/28/2024 CBC WBC 14.44    Imaging:   TTE on 4/27/2021 showed an EF of 60%, borderline concentric LVH. She had mild TR with RVSP of 57mmHg.     PFT's:  4/5/24: FVC 50%, FEV1 56 %, ratio 82, TLC 47%, DLCO 17%, DLCO/VA 40%.     ECHO:  07/20/2022 echocardiogram showed EF normal, 60%.  Systolic function normal.  Low normal right ventricular systolic function.  PASP 62 mmHg.  Moderate pulmonary hypertension.  Moderate pulmonary regurgitation.  08/14/2023 Echocardiogram showed EF 57%, systolic function normal of both right and left ventricle. PASP 26 mmHg.    CT CHEST:   4/2021:  CT chest showed worsened appearance of lungs compared to prior in August 2020 with extensive ground-glass and reticular opacities in both lungs.  No convincing honeycombing.  Mild bronchiectasis favoring the lung bases.  10/19/22:  CT scan of chest showed  diffuse bilateral ground-glass interstitial infiltrates associated with interstitial fibrosis in periphery of lungs bilaterally.  Some cystic changes seen in periphery of lungs bilaterally.  No significant change of ILD since April 2021.  Some bronchiectasis seen in upper lobes bilaterally.  No pleural effusion, mediastinum unremarkable, no lymphadenopathy.  08/19/2023 CT chest showed consolidation seen in April 20, 2021 and October 2022 has resolved. Extensive septal thickening and bronchiectasis with sparing of lung apices. Faint bibasilar predominant ground-glass opacities to extensive septal thickening. There is calcified plaque in LAD  02/22/2024 CT chest: areas of bilateral fibrosis, ground-glass and mild bronchiectasis with overall similar appearance since August 2023.  Stable right upper lobe nodule.  Few borderline enlarged mediastinal lymph nodes.    5/28/2024 CXRay: Increase interstitial markings in both lungs chronic in nature in similar to previous exams. No superimposed changes and no evidence of active pulmonary disease    Right knee 7/2022: There are degenerative osteoarthritic narrowing of the medial and the lateral compartment of the right knee joint spaces.  Medial compartment joint space narrowing is more pronounced and there is subchondral sclerosis and osteophytes.  Articular surfaces alignment is preserved.  No acute fracture or dislocation. Right knee degenerative osteoarthritic changes, unchanged.    7/2022:  Kidney biopsy showed limited sample immunofluorescence negative for immune complex mediated process.  Acute tubular injury.  Mild interstitial fibrosis and atrophy.    Assessment:       ICD-10-CM ICD-9-CM   1. Antisynthetase syndrome  D89.89 279.49   2. ILD (interstitial lung disease)  J84.9 515   3. Seropositive rheumatoid arthritis of multiple sites  M05.79 714.0   4. Pulmonary HTN  I27.20 416.8   5. History of miscarriage  Z87.59 V13.29   6. Primary hypertension  I10 401.9   7.  Drug-induced immunodeficiency  D84.821 279.3    Z79.899 E947.9   8. Positive INES (antinuclear antibody)  R76.8 795.79   9. Rash  R21 782.1   10. High risk medication use  Z79.899 V58.69          Plan:       1. 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 She continues to be on home oxygen at 2 L.   - Positive INES associated with myositis. All INOCENTE's negative except for SSA. SSA related to lung disease and myositis. Will check complements, UA and UPC periodically.   - CT chest showed improvement in 8/2023 and ECHO showed improvement of RVSP in 8/2023. She was not able to do PFT's in 8/2022.   - CT chest 02/22/2024; areas of bilateral fibrosis, ground-glass and mild bronchiectasis with overall similar appearance since August 2023.  Stable right upper lobe nodule.  Few borderline enlarged mediastinal lymph nodes  - Chest CT stable, continues to show ground-glass opacities, started her on CellCept January 2024.  - She was OFEV 150 mg BID, dose decreased to 100 mg b.i.d. because of elevated LFTs.    - Shortness of breath is stable.   - Continue with Actemra 162 mg SQ once a week.  Taking CellCept 1000 mg BID, continue that.  - Stopped Plaquenil January 2024  - Labs in 6 weeks.   - Infection w/u negative 7/2023    2.+ RF rheumatoid arthritis   H/o inflammatory pattern of joint pain. Continue Actemra.     3. 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 Reports significant symptomatic improvement and increase ability to do ADLs after initiation of Ofev. On 2LNC, stable, no progression in lung disease in CT scan of the chest done in October 2022. Unable to perform PFT. CT chest showed improvement in 8/2023 and ECHO showed improvement of RVSP in 8/2023. Continue follow-up with Pulmonary. She was referred for lung transplant eval, initially canceled several years ago due to obesity, recently had to be rescheduled due to transportation  difficulties, has not seen them yet (has apt in July 2024)  - Continue to follow up with pulmonary.  - Repeat CT chest in February 2024 is stable, continued to show bilateral fibrosis, ground-glass opacities, added CellCept to Actemra.    4. Pulmonary hypertension  07/20/2022 echocardiogram showed EF normal, 60%.  Low normal right ventricular systolic function.  PASP 62 mmHg.  Moderate pulmonary hypertension.  Moderate pulmonary regurgitation.  She [facility].    Repeat echocardiogram in August 2023 showed improvement of PASP, 26 mmHg. No need to see pulmonary hypertension specialist for now. Repeat echo.     5. Hx of two miscarriages  -Both at 4 months gestation.  2/2021:  Beta 2 glycoprotein negative.  Lupus anticoagulant not detected.  Cardiolipin IgM 30, slightly elevated, IgG negative.    -7/2023: Phospholipid/cardiolipin IgM 20, weak positive, IgG negative. Beta 2 glycoprotein negative. Lupus anticoagulant not detected.   -Takes ASA daily.    6. Rash  - H/o macular rash on face around nose and papular rash on back of neck. Photoprotection discussed.  C/w topical triamcinolone cream as needed. Rash has resolved, on issues at this time.     7. HTN  - Currently elevated today. She did not bring her medications today, not totally sure what she is taking for BP. No symptoms of HTN, advised to call PCP, as soon as possible for better management of HTN. Educated to bring medications with her to every visit.     8. High Risk Med Use/Drug Induced Immunodeficency    Persons with rheumatoid arthritis, lupus, psoriatic arthritis and other autoimmune diseases are at increased risk of cardiovascular disease including heart attack and stroke. We recommend that all patients with these conditions have annual health maintenance exams including lipid measurements, blood pressure measurements, and smoking cessation counseling when applicable at their primary care provider's office.   -Advised to stay  up-to-date on age appropriate vaccinations and malignancy screening, including yearly skin exams.    -Mammogram UTD, PAP due, Cologuard 8/2023 negative- enc to discuss with PCP to stay on top of yearly screenings  - PCV 13 in February 2021. Pneumovax 20 11/6/23. Zoster 1/24/24, 3/11/24 .      Follow up in about 3 months (around 9/19/2024) for with me. In addition to their scheduled follow up, the patient has also been instructed to follow up on as needed basis.      Total time spent with patient and documentation is 45 minutes. Answered all her questions and patient verbalized understanding.

## 2024-07-03 ENCOUNTER — TELEPHONE (OUTPATIENT)
Dept: TRANSPLANT | Facility: CLINIC | Age: 49
End: 2024-07-03
Payer: MEDICARE

## 2024-07-05 ENCOUNTER — DOCUMENTATION ONLY (OUTPATIENT)
Dept: TRANSPLANT | Facility: CLINIC | Age: 49
End: 2024-07-05
Payer: MEDICARE

## 2024-07-05 NOTE — PROGRESS NOTES
Patient once again did not show for her scheduled appointments on 7/3/24. Lung transplant episode declined due to patient's inability to attend her scheduled appointments.